# Patient Record
Sex: MALE | Race: BLACK OR AFRICAN AMERICAN | Employment: OTHER | URBAN - METROPOLITAN AREA
[De-identification: names, ages, dates, MRNs, and addresses within clinical notes are randomized per-mention and may not be internally consistent; named-entity substitution may affect disease eponyms.]

---

## 2022-04-08 ENCOUNTER — HOSPITAL ENCOUNTER (EMERGENCY)
Facility: HOSPITAL | Age: 82
Discharge: HOME | End: 2022-04-09
Attending: EMERGENCY MEDICINE

## 2022-04-08 ENCOUNTER — APPOINTMENT (OUTPATIENT)
Dept: ULTRASOUND IMAGING | Facility: HOSPITAL | Age: 82
End: 2022-04-08
Attending: EMERGENCY MEDICINE

## 2022-04-08 VITALS
HEART RATE: 80 BPM | DIASTOLIC BLOOD PRESSURE: 95 MMHG | OXYGEN SATURATION: 96 % | TEMPERATURE: 98 F | SYSTOLIC BLOOD PRESSURE: 184 MMHG | RESPIRATION RATE: 18 BRPM | WEIGHT: 165.38 LBS

## 2022-04-08 DIAGNOSIS — R60.9 PERIPHERAL EDEMA: Primary | ICD-10-CM

## 2022-04-08 LAB
ALBUMIN SERPL-MCNC: 3.1 G/DL (ref 3.4–5)
ALBUMIN/GLOB SERPL: 0.7 {RATIO} (ref 1–2)
ALP LIVER SERPL-CCNC: 88 U/L
ALT SERPL-CCNC: 29 U/L
ANION GAP SERPL CALC-SCNC: 2 MMOL/L (ref 0–18)
AST SERPL-CCNC: 30 U/L (ref 15–37)
BASOPHILS # BLD AUTO: 0.04 X10(3) UL (ref 0–0.2)
BASOPHILS NFR BLD AUTO: 0.7 %
BILIRUB SERPL-MCNC: 0.6 MG/DL (ref 0.1–2)
BUN BLD-MCNC: 18 MG/DL (ref 7–18)
CALCIUM BLD-MCNC: 9 MG/DL (ref 8.5–10.1)
CHLORIDE SERPL-SCNC: 109 MMOL/L (ref 98–112)
CO2 SERPL-SCNC: 29 MMOL/L (ref 21–32)
CREAT BLD-MCNC: 1.35 MG/DL
EOSINOPHIL # BLD AUTO: 0.36 X10(3) UL (ref 0–0.7)
EOSINOPHIL NFR BLD AUTO: 6.2 %
ERYTHROCYTE [DISTWIDTH] IN BLOOD BY AUTOMATED COUNT: 16.4 %
GLOBULIN PLAS-MCNC: 4.3 G/DL (ref 2.8–4.4)
GLUCOSE BLD-MCNC: 120 MG/DL (ref 70–99)
HCT VFR BLD AUTO: 25.3 %
HGB BLD-MCNC: 8.6 G/DL
IMM GRANULOCYTES # BLD AUTO: 0.01 X10(3) UL (ref 0–1)
IMM GRANULOCYTES NFR BLD: 0.2 %
LYMPHOCYTES # BLD AUTO: 1.28 X10(3) UL (ref 1–4)
LYMPHOCYTES NFR BLD AUTO: 21.9 %
MCH RBC QN AUTO: 28.6 PG (ref 26–34)
MCHC RBC AUTO-ENTMCNC: 34 G/DL (ref 31–37)
MCV RBC AUTO: 84.1 FL
MONOCYTES # BLD AUTO: 0.55 X10(3) UL (ref 0.1–1)
MONOCYTES NFR BLD AUTO: 9.4 %
NEUTROPHILS # BLD AUTO: 3.61 X10 (3) UL (ref 1.5–7.7)
NEUTROPHILS # BLD AUTO: 3.61 X10(3) UL (ref 1.5–7.7)
NEUTROPHILS NFR BLD AUTO: 61.6 %
OSMOLALITY SERPL CALC.SUM OF ELEC: 293 MOSM/KG (ref 275–295)
PLATELET # BLD AUTO: 195 10(3)UL (ref 150–450)
POTASSIUM SERPL-SCNC: 3.7 MMOL/L (ref 3.5–5.1)
PROT SERPL-MCNC: 7.4 G/DL (ref 6.4–8.2)
RBC # BLD AUTO: 3.01 X10(6)UL
SARS-COV-2 RNA RESP QL NAA+PROBE: NOT DETECTED
SODIUM SERPL-SCNC: 140 MMOL/L (ref 136–145)
WBC # BLD AUTO: 5.9 X10(3) UL (ref 4–11)

## 2022-04-08 PROCEDURE — 99284 EMERGENCY DEPT VISIT MOD MDM: CPT

## 2022-04-08 PROCEDURE — 85025 COMPLETE CBC W/AUTO DIFF WBC: CPT | Performed by: EMERGENCY MEDICINE

## 2022-04-08 PROCEDURE — 36415 COLL VENOUS BLD VENIPUNCTURE: CPT

## 2022-04-08 PROCEDURE — 93970 EXTREMITY STUDY: CPT | Performed by: EMERGENCY MEDICINE

## 2022-04-08 PROCEDURE — 93971 EXTREMITY STUDY: CPT | Performed by: EMERGENCY MEDICINE

## 2022-04-08 PROCEDURE — 80053 COMPREHEN METABOLIC PANEL: CPT | Performed by: EMERGENCY MEDICINE

## 2022-04-08 RX ORDER — GLYBURIDE-METFORMIN HYDROCHLORIDE 5; 500 MG/1; MG/1
1 TABLET ORAL
COMMUNITY

## 2022-04-08 RX ORDER — CARVEDILOL 6.25 MG/1
6.25 TABLET ORAL 2 TIMES DAILY WITH MEALS
COMMUNITY

## 2022-04-08 RX ORDER — CILOSTAZOL 100 MG/1
100 TABLET ORAL 2 TIMES DAILY
COMMUNITY

## 2022-04-08 RX ORDER — ASPIRIN 81 MG/1
81 TABLET ORAL DAILY
COMMUNITY

## 2022-04-08 RX ORDER — LOSARTAN POTASSIUM 100 MG/1
TABLET ORAL DAILY
COMMUNITY

## 2022-04-08 RX ORDER — TAMSULOSIN HYDROCHLORIDE 0.4 MG/1
CAPSULE ORAL DAILY
COMMUNITY

## 2022-04-08 RX ORDER — ROSUVASTATIN CALCIUM 10 MG/1
10 TABLET, COATED ORAL NIGHTLY
COMMUNITY

## 2022-06-24 ENCOUNTER — APPOINTMENT (OUTPATIENT)
Dept: GENERAL RADIOLOGY | Facility: HOSPITAL | Age: 82
End: 2022-06-24
Attending: EMERGENCY MEDICINE
Payer: MEDICARE

## 2022-06-24 ENCOUNTER — HOSPITAL ENCOUNTER (OUTPATIENT)
Facility: HOSPITAL | Age: 82
Setting detail: OBSERVATION
Discharge: HOME | End: 2022-06-26
Attending: EMERGENCY MEDICINE | Admitting: HOSPITALIST
Payer: MEDICARE

## 2022-06-24 DIAGNOSIS — N17.9 AKI (ACUTE KIDNEY INJURY) (HCC): ICD-10-CM

## 2022-06-24 DIAGNOSIS — J18.9 COMMUNITY ACQUIRED PNEUMONIA, UNSPECIFIED LATERALITY: Primary | ICD-10-CM

## 2022-06-24 DIAGNOSIS — N30.00 ACUTE CYSTITIS WITHOUT HEMATURIA: ICD-10-CM

## 2022-06-24 LAB
ADENOVIRUS PCR:: NOT DETECTED
ALBUMIN SERPL-MCNC: 3.6 G/DL (ref 3.4–5)
ALBUMIN/GLOB SERPL: 0.8 {RATIO} (ref 1–2)
ALP LIVER SERPL-CCNC: 83 U/L
ALT SERPL-CCNC: 18 U/L
ANION GAP SERPL CALC-SCNC: 8 MMOL/L (ref 0–18)
AST SERPL-CCNC: 19 U/L (ref 15–37)
B PARAPERT DNA SPEC QL NAA+PROBE: NOT DETECTED
B PERT DNA SPEC QL NAA+PROBE: NOT DETECTED
BASOPHILS # BLD AUTO: 0.04 X10(3) UL (ref 0–0.2)
BASOPHILS NFR BLD AUTO: 0.4 %
BILIRUB SERPL-MCNC: 0.8 MG/DL (ref 0.1–2)
BILIRUB UR QL STRIP.AUTO: NEGATIVE
BUN BLD-MCNC: 29 MG/DL (ref 7–18)
C PNEUM DNA SPEC QL NAA+PROBE: NOT DETECTED
CALCIUM BLD-MCNC: 9.7 MG/DL (ref 8.5–10.1)
CHLORIDE SERPL-SCNC: 103 MMOL/L (ref 98–112)
CO2 SERPL-SCNC: 23 MMOL/L (ref 21–32)
COLOR UR AUTO: YELLOW
CORONAVIRUS 229E PCR:: NOT DETECTED
CORONAVIRUS HKU1 PCR:: NOT DETECTED
CORONAVIRUS NL63 PCR:: NOT DETECTED
CORONAVIRUS OC43 PCR:: NOT DETECTED
CREAT BLD-MCNC: 1.67 MG/DL
EOSINOPHIL # BLD AUTO: 0.28 X10(3) UL (ref 0–0.7)
EOSINOPHIL NFR BLD AUTO: 3 %
ERYTHROCYTE [DISTWIDTH] IN BLOOD BY AUTOMATED COUNT: 14.1 %
EST. AVERAGE GLUCOSE BLD GHB EST-MCNC: 183 MG/DL (ref 68–126)
FLUAV RNA SPEC QL NAA+PROBE: NOT DETECTED
FLUBV RNA SPEC QL NAA+PROBE: NOT DETECTED
GLOBULIN PLAS-MCNC: 4.7 G/DL (ref 2.8–4.4)
GLUCOSE BLD-MCNC: 139 MG/DL (ref 70–99)
GLUCOSE BLD-MCNC: 179 MG/DL (ref 70–99)
GLUCOSE UR STRIP.AUTO-MCNC: 50 MG/DL
HBA1C MFR BLD: 8 % (ref ?–5.7)
HCT VFR BLD AUTO: 29.6 %
HGB BLD-MCNC: 10.5 G/DL
IMM GRANULOCYTES # BLD AUTO: 0.04 X10(3) UL (ref 0–1)
IMM GRANULOCYTES NFR BLD: 0.4 %
KETONES UR STRIP.AUTO-MCNC: NEGATIVE MG/DL
LYMPHOCYTES # BLD AUTO: 1.21 X10(3) UL (ref 1–4)
LYMPHOCYTES NFR BLD AUTO: 13.1 %
MCH RBC QN AUTO: 28.9 PG (ref 26–34)
MCHC RBC AUTO-ENTMCNC: 35.5 G/DL (ref 31–37)
MCV RBC AUTO: 81.5 FL
METAPNEUMOVIRUS PCR:: NOT DETECTED
MONOCYTES # BLD AUTO: 0.88 X10(3) UL (ref 0.1–1)
MONOCYTES NFR BLD AUTO: 9.5 %
MYCOPLASMA PNEUMONIA PCR:: NOT DETECTED
NEUTROPHILS # BLD AUTO: 6.81 X10 (3) UL (ref 1.5–7.7)
NEUTROPHILS # BLD AUTO: 6.81 X10(3) UL (ref 1.5–7.7)
NEUTROPHILS NFR BLD AUTO: 73.6 %
NITRITE UR QL STRIP.AUTO: POSITIVE
OSMOLALITY SERPL CALC.SUM OF ELEC: 288 MOSM/KG (ref 275–295)
PARAINFLUENZA 1 PCR:: NOT DETECTED
PARAINFLUENZA 2 PCR:: NOT DETECTED
PARAINFLUENZA 3 PCR:: NOT DETECTED
PARAINFLUENZA 4 PCR:: NOT DETECTED
PH UR STRIP.AUTO: 5 [PH] (ref 5–8)
PLATELET # BLD AUTO: 200 10(3)UL (ref 150–450)
POTASSIUM SERPL-SCNC: 4 MMOL/L (ref 3.5–5.1)
PROCALCITONIN SERPL-MCNC: 0.13 NG/ML (ref ?–0.16)
PROT SERPL-MCNC: 8.3 G/DL (ref 6.4–8.2)
PROT UR STRIP.AUTO-MCNC: 100 MG/DL
RBC # BLD AUTO: 3.63 X10(6)UL
RBC #/AREA URNS AUTO: >10 /HPF
RHINOVIRUS/ENTERO PCR:: DETECTED
RSV RNA SPEC QL NAA+PROBE: NOT DETECTED
SARS-COV-2 RNA NPH QL NAA+NON-PROBE: NOT DETECTED
SARS-COV-2 RNA RESP QL NAA+PROBE: NOT DETECTED
SODIUM SERPL-SCNC: 134 MMOL/L (ref 136–145)
SP GR UR STRIP.AUTO: 1.02 (ref 1–1.03)
UROBILINOGEN UR STRIP.AUTO-MCNC: <2 MG/DL
WBC # BLD AUTO: 9.3 X10(3) UL (ref 4–11)
WBC #/AREA URNS AUTO: >50 /HPF
WBC CLUMPS UR QL AUTO: PRESENT /HPF

## 2022-06-24 PROCEDURE — 99220 INITIAL OBSERVATION CARE,LEVL III: CPT | Performed by: HOSPITALIST

## 2022-06-24 PROCEDURE — 71045 X-RAY EXAM CHEST 1 VIEW: CPT | Performed by: EMERGENCY MEDICINE

## 2022-06-24 RX ORDER — GUAIFENESIN 600 MG
600 TABLET, EXTENDED RELEASE 12 HR ORAL 2 TIMES DAILY
Status: DISCONTINUED | OUTPATIENT
Start: 2022-06-24 | End: 2022-06-26

## 2022-06-24 RX ORDER — DEXTROSE MONOHYDRATE 25 G/50ML
50 INJECTION, SOLUTION INTRAVENOUS
Status: DISCONTINUED | OUTPATIENT
Start: 2022-06-24 | End: 2022-06-26

## 2022-06-24 RX ORDER — SODIUM CHLORIDE 9 MG/ML
INJECTION, SOLUTION INTRAVENOUS CONTINUOUS
Status: DISCONTINUED | OUTPATIENT
Start: 2022-06-24 | End: 2022-06-26

## 2022-06-24 RX ORDER — ONDANSETRON 2 MG/ML
4 INJECTION INTRAMUSCULAR; INTRAVENOUS EVERY 6 HOURS PRN
Status: DISCONTINUED | OUTPATIENT
Start: 2022-06-24 | End: 2022-06-26

## 2022-06-24 RX ORDER — HEPARIN SODIUM 5000 [USP'U]/ML
5000 INJECTION, SOLUTION INTRAVENOUS; SUBCUTANEOUS EVERY 8 HOURS SCHEDULED
Status: DISCONTINUED | OUTPATIENT
Start: 2022-06-24 | End: 2022-06-26

## 2022-06-24 RX ORDER — METOCLOPRAMIDE HYDROCHLORIDE 5 MG/ML
5 INJECTION INTRAMUSCULAR; INTRAVENOUS EVERY 8 HOURS PRN
Status: DISCONTINUED | OUTPATIENT
Start: 2022-06-24 | End: 2022-06-26

## 2022-06-24 RX ORDER — NICOTINE POLACRILEX 4 MG
30 LOZENGE BUCCAL
Status: DISCONTINUED | OUTPATIENT
Start: 2022-06-24 | End: 2022-06-26

## 2022-06-24 RX ORDER — SODIUM CHLORIDE 9 MG/ML
1000 INJECTION, SOLUTION INTRAVENOUS ONCE
Status: COMPLETED | OUTPATIENT
Start: 2022-06-24 | End: 2022-06-26

## 2022-06-24 RX ORDER — ACETAMINOPHEN 500 MG
1000 TABLET ORAL ONCE
Status: COMPLETED | OUTPATIENT
Start: 2022-06-24 | End: 2022-06-24

## 2022-06-24 RX ORDER — ACETAMINOPHEN 500 MG
500 TABLET ORAL EVERY 4 HOURS PRN
Status: DISCONTINUED | OUTPATIENT
Start: 2022-06-24 | End: 2022-06-26

## 2022-06-24 RX ORDER — NICOTINE POLACRILEX 4 MG
15 LOZENGE BUCCAL
Status: DISCONTINUED | OUTPATIENT
Start: 2022-06-24 | End: 2022-06-26

## 2022-06-24 NOTE — ED INITIAL ASSESSMENT (HPI)
Family reports confusion and difficulty walking that started yesterday. PT seen at MD office and was told to come to ER. Denies pain, fever. Family reports increase in cough.

## 2022-06-25 LAB
ANION GAP SERPL CALC-SCNC: 6 MMOL/L (ref 0–18)
ATRIAL RATE: 102 BPM
BASOPHILS # BLD AUTO: 0.04 X10(3) UL (ref 0–0.2)
BASOPHILS NFR BLD AUTO: 0.5 %
BUN BLD-MCNC: 24 MG/DL (ref 7–18)
CALCIUM BLD-MCNC: 9.2 MG/DL (ref 8.5–10.1)
CHLORIDE SERPL-SCNC: 106 MMOL/L (ref 98–112)
CO2 SERPL-SCNC: 26 MMOL/L (ref 21–32)
CREAT BLD-MCNC: 1.32 MG/DL
EOSINOPHIL # BLD AUTO: 0.31 X10(3) UL (ref 0–0.7)
EOSINOPHIL NFR BLD AUTO: 4.2 %
ERYTHROCYTE [DISTWIDTH] IN BLOOD BY AUTOMATED COUNT: 14.1 %
GLUCOSE BLD-MCNC: 125 MG/DL (ref 70–99)
GLUCOSE BLD-MCNC: 128 MG/DL (ref 70–99)
GLUCOSE BLD-MCNC: 144 MG/DL (ref 70–99)
GLUCOSE BLD-MCNC: 159 MG/DL (ref 70–99)
GLUCOSE BLD-MCNC: 218 MG/DL (ref 70–99)
HCT VFR BLD AUTO: 28.7 %
HGB BLD-MCNC: 9.9 G/DL
IMM GRANULOCYTES # BLD AUTO: 0.03 X10(3) UL (ref 0–1)
IMM GRANULOCYTES NFR BLD: 0.4 %
LYMPHOCYTES # BLD AUTO: 1.01 X10(3) UL (ref 1–4)
LYMPHOCYTES NFR BLD AUTO: 13.6 %
MCH RBC QN AUTO: 28.9 PG (ref 26–34)
MCHC RBC AUTO-ENTMCNC: 34.5 G/DL (ref 31–37)
MCV RBC AUTO: 83.9 FL
MONOCYTES # BLD AUTO: 0.77 X10(3) UL (ref 0.1–1)
MONOCYTES NFR BLD AUTO: 10.3 %
NEUTROPHILS # BLD AUTO: 5.28 X10 (3) UL (ref 1.5–7.7)
NEUTROPHILS # BLD AUTO: 5.28 X10(3) UL (ref 1.5–7.7)
NEUTROPHILS NFR BLD AUTO: 71 %
OSMOLALITY SERPL CALC.SUM OF ELEC: 292 MOSM/KG (ref 275–295)
P AXIS: 66 DEGREES
P-R INTERVAL: 198 MS
PLATELET # BLD AUTO: 188 10(3)UL (ref 150–450)
POTASSIUM SERPL-SCNC: 3.5 MMOL/L (ref 3.5–5.1)
Q-T INTERVAL: 358 MS
QRS DURATION: 88 MS
QTC CALCULATION (BEZET): 466 MS
R AXIS: 19 DEGREES
RBC # BLD AUTO: 3.42 X10(6)UL
SODIUM SERPL-SCNC: 138 MMOL/L (ref 136–145)
T AXIS: 104 DEGREES
VENTRICULAR RATE: 102 BPM
WBC # BLD AUTO: 7.4 X10(3) UL (ref 4–11)

## 2022-06-25 PROCEDURE — 99226 SUBSEQUENT OBSERVATION CARE: CPT | Performed by: STUDENT IN AN ORGANIZED HEALTH CARE EDUCATION/TRAINING PROGRAM

## 2022-06-25 NOTE — PLAN OF CARE
6/25 alert, forgetful, able to use call light appropriately, with runny nose, denies any sob nor any pain. Ambulates with contact guard rolling walker, gait belt. Continent of b/b., on ivf, iv antibiotics.     Problem: Diabetes/Glucose Control  Goal: Glucose maintained within prescribed range  Description: INTERVENTIONS:  - Monitor Blood Glucose as ordered  - Assess for signs and symptoms of hyperglycemia and hypoglycemia  - Administer ordered medications to maintain glucose within target range  - Assess barriers to adequate nutritional intake and initiate nutrition consult as needed  - Instruct patient on self management of diabetes  Outcome: Progressing     Problem: Patient/Family Goals  Goal: Patient/Family Long Term Goal  Description: Patient's Long Term Goal: to be able to go home    Interventions:  -ivf, iv antibiotics, mucinex, cough medicine.  - See additional Care Plan goals for specific interventions  Outcome: Progressing  Goal: Patient/Family Short Term Goal  Description: Patient's Short Term Goal:   To be able to breath effectively without oxygen    Interventions:   -oxygen sat monitoring, on mucinex, iv antibiotics for pneumonia and uti  - See additional Care Plan goals for specific interventions  Outcome: Progressing

## 2022-06-25 NOTE — PLAN OF CARE
NURSING ADMISSION NOTE      Patient admitted via Cart  Oriented to room. Safety precautions initiated. Bed in low position. Call light in reach. AO x3/4, forgetful at times history of dementia. RA. Tele - NSR. Heparin subcutaneous. Briefed. Voids via urinal. No reports of pain. Up stanbdby with a walker, increased weakness. Continuous fluids running per MAR. IV Rocephin. Carb control diet. QID accuchecks. Daughter and patient updated on POC. No futher needs at this time.      Problem: Diabetes/Glucose Control  Goal: Glucose maintained within prescribed range  Description: INTERVENTIONS:  - Monitor Blood Glucose as ordered  - Assess for signs and symptoms of hyperglycemia and hypoglycemia  - Administer ordered medications to maintain glucose within target range  - Assess barriers to adequate nutritional intake and initiate nutrition consult as needed  - Instruct patient on self management of diabetes  Outcome: Progressing     Problem: Patient/Family Goals  Goal: Patient/Family Long Term Goal  Description: Patient's Long Term Goal:     Interventions:  -   - See additional Care Plan goals for specific interventions  Outcome: Progressing  Goal: Patient/Family Short Term Goal  Description: Patient's Short Term Goal:     Interventions:   -   - See additional Care Plan goals for specific interventions  Outcome: Progressing

## 2022-06-25 NOTE — ED QUICK NOTES
Orders for admission, patient is aware of plan and ready to go upstairs. Any questions, please call ED RN Kraig Willis  at [facility]?  yes  Type of COVID test sent:  COVID Suspicion level: Low/High  low    Titratable drug(s) infusing:  Rate:    LOC at time of transport:  aox4  Other pertinent information:    CIWA score=  NIH score=

## 2022-06-26 VITALS
RESPIRATION RATE: 19 BRPM | OXYGEN SATURATION: 100 % | DIASTOLIC BLOOD PRESSURE: 61 MMHG | WEIGHT: 147.13 LBS | SYSTOLIC BLOOD PRESSURE: 159 MMHG | HEART RATE: 63 BPM | TEMPERATURE: 99 F | BODY MASS INDEX: 23.65 KG/M2 | HEIGHT: 66 IN

## 2022-06-26 LAB
GLUCOSE BLD-MCNC: 113 MG/DL (ref 70–99)
GLUCOSE BLD-MCNC: 139 MG/DL (ref 70–99)
GLUCOSE BLD-MCNC: 186 MG/DL (ref 70–99)
L PNEUMO AG UR QL: NEGATIVE
STREP PNEUMO ANTIGEN, URINE: NEGATIVE

## 2022-06-26 PROCEDURE — 99217 OBSERVATION CARE DISCHARGE: CPT | Performed by: STUDENT IN AN ORGANIZED HEALTH CARE EDUCATION/TRAINING PROGRAM

## 2022-06-26 RX ORDER — TAMSULOSIN HYDROCHLORIDE 0.4 MG/1
0.4 CAPSULE ORAL DAILY
Status: DISCONTINUED | OUTPATIENT
Start: 2022-06-26 | End: 2022-06-26

## 2022-06-26 RX ORDER — CARVEDILOL 12.5 MG/1
12.5 TABLET ORAL 2 TIMES DAILY WITH MEALS
Status: DISCONTINUED | OUTPATIENT
Start: 2022-06-26 | End: 2022-06-26

## 2022-06-26 RX ORDER — ASPIRIN 81 MG/1
81 TABLET ORAL DAILY
Status: DISCONTINUED | OUTPATIENT
Start: 2022-06-26 | End: 2022-06-26

## 2022-06-26 RX ORDER — CEPHALEXIN 500 MG/1
500 CAPSULE ORAL 4 TIMES DAILY
Qty: 20 CAPSULE | Refills: 0 | Status: SHIPPED | OUTPATIENT
Start: 2022-06-26 | End: 2022-07-01

## 2022-06-26 RX ORDER — ROSUVASTATIN CALCIUM 10 MG/1
10 TABLET, COATED ORAL NIGHTLY
Status: DISCONTINUED | OUTPATIENT
Start: 2022-06-26 | End: 2022-06-26

## 2022-06-26 RX ORDER — CILOSTAZOL 100 MG/1
100 TABLET ORAL 2 TIMES DAILY
Status: DISCONTINUED | OUTPATIENT
Start: 2022-06-26 | End: 2022-06-26

## 2022-06-26 RX ORDER — LOSARTAN POTASSIUM 100 MG/1
100 TABLET ORAL DAILY
Status: DISCONTINUED | OUTPATIENT
Start: 2022-06-26 | End: 2022-06-26

## 2022-06-26 NOTE — PLAN OF CARE
A&O x3/4. Forgetful at times. RA. Tele - NSR. Heparin subcutaneous. Voids up stanby with walker and gait belt. No reports of any pain. IV rocephin. IV Zithromax. QID accuchecks. Fluids running per MAR. Carb controlled diet. Pt  resting in bed with call light within reach, no further needs at this time.         Problem: Diabetes/Glucose Control  Goal: Glucose maintained within prescribed range  Description: INTERVENTIONS:  - Monitor Blood Glucose as ordered  - Assess for signs and symptoms of hyperglycemia and hypoglycemia  - Administer ordered medications to maintain glucose within target range  - Assess barriers to adequate nutritional intake and initiate nutrition consult as needed  - Instruct patient on self management of diabetes  Outcome: Progressing     Problem: Patient/Family Goals  Goal: Patient/Family Long Term Goal  Description: Patient's Long Term Goal:     Interventions:  -  - See additional Care Plan goals for specific interventions  Outcome: Progressing  Goal: Patient/Family Short Term Goal  Description: Patient's Short Term Goal:     Interventions:   -   - See additional Care Plan goals for specific interventions  Outcome: Progressing

## 2022-06-26 NOTE — PLAN OF CARE
Problem: Diabetes/Glucose Control  Goal: Glucose maintained within prescribed range  Description: INTERVENTIONS:  - Monitor Blood Glucose as ordered  - Assess for signs and symptoms of hyperglycemia and hypoglycemia  - Administer ordered medications to maintain glucose within target range  - Assess barriers to adequate nutritional intake and initiate nutrition consult as needed  - Instruct patient on self management of diabetes  6/26/2022 1057 by Yuliana Fischer RN  Outcome: Progressing  6/26/2022 1056 by Yuliana Fischer RN  Outcome: Progressing     Problem: Patient/Family Goals  Goal: Patient/Family Long Term Goal  Description: Patient's Long Term Goal: Go home    Interventions:  - Follow POC  - See additional Care Plan goals for specific interventions  6/26/2022 1057 by Yuliana Fischer RN  Outcome: Progressing  6/26/2022 1056 by Yuliana Fischer RN  Outcome: Progressing  Goal: Patient/Family Short Term Goal  Description: Patient's Short Term Goal: Go home    Interventions:   - 6/26 Am:Feel better  - See additional Care Plan goals for specific interventions  6/26/2022 1057 by Yuliana Fischer RN  Outcome: Progressing  6/26/2022 1056 by Yuliana Fischer RN  Outcome: Progressing

## 2022-06-26 NOTE — PROGRESS NOTES
NURSING DISCHARGE NOTE     Discharged Home with dtr via Wheelchair. Accompanied by Support staff  Belongings Taken by patient/family. Patient is stable to discharge home. Medically cleared by all consults and hospitalist. Reviewed discharge instructions about medications and follow-up appointments with the patient. Patient verbalized understanding. Questions and concerns addressed. IV line dc'ed. Pressure and dressing applied. Clean/Dry/Intact. Discharge navigator completed. Tele box removed,cleaned and returned to drawer. All belongings sent with patient.

## 2022-06-28 LAB
ESTIMATED AVERAGE GLUCOSE: 174 MG/DL
HEMOGLOBIN A1C: 7.7 %

## 2022-07-01 ENCOUNTER — PATIENT OUTREACH (OUTPATIENT)
Dept: CASE MANAGEMENT | Age: 82
End: 2022-07-01

## 2022-07-01 ENCOUNTER — TELEPHONE (OUTPATIENT)
Dept: NEUROLOGY | Facility: CLINIC | Age: 82
End: 2022-07-01

## 2022-07-01 NOTE — TELEPHONE ENCOUNTER
LM explaining that a brief memory assessment is done during the office visit, but that usually a neuropsychology evaluation is ordered for more extensive memory testing.

## 2022-07-01 NOTE — TELEPHONE ENCOUNTER
pt's daughter wants to know at the upcoming appt will the provider do the dementia screening same day?

## 2022-07-05 NOTE — PROGRESS NOTES
2nd attempt DM apt request    Kaylyn Howell Rule  [address] [facility]  [place] [id number]  [phone]  Patient has apt previously scheduled

## 2022-07-08 ENCOUNTER — OFFICE VISIT (OUTPATIENT)
Dept: NEUROLOGY | Facility: CLINIC | Age: 82
End: 2022-07-08
Payer: MEDICARE

## 2022-07-08 ENCOUNTER — LAB ENCOUNTER (OUTPATIENT)
Dept: LAB | Age: 82
End: 2022-07-08
Attending: Other
Payer: MEDICARE

## 2022-07-08 VITALS
SYSTOLIC BLOOD PRESSURE: 130 MMHG | BODY MASS INDEX: 24 KG/M2 | RESPIRATION RATE: 16 BRPM | WEIGHT: 147 LBS | DIASTOLIC BLOOD PRESSURE: 60 MMHG | HEART RATE: 68 BPM

## 2022-07-08 DIAGNOSIS — R41.3 MEMORY CHANGE: ICD-10-CM

## 2022-07-08 DIAGNOSIS — R41.3 MEMORY CHANGE: Primary | ICD-10-CM

## 2022-07-08 LAB
FOLATE SERPL-MCNC: 14.3 NG/ML (ref 8.7–?)
T4 FREE SERPL-MCNC: 0.9 NG/DL (ref 0.8–1.7)
TSI SER-ACNC: 3.48 MIU/ML (ref 0.36–3.74)
VIT B12 SERPL-MCNC: 552 PG/ML (ref 193–986)

## 2022-07-08 PROCEDURE — 82746 ASSAY OF FOLIC ACID SERUM: CPT

## 2022-07-08 PROCEDURE — 3078F DIAST BP <80 MM HG: CPT | Performed by: OTHER

## 2022-07-08 PROCEDURE — 3075F SYST BP GE 130 - 139MM HG: CPT | Performed by: OTHER

## 2022-07-08 PROCEDURE — 99204 OFFICE O/P NEW MOD 45 MIN: CPT | Performed by: OTHER

## 2022-07-08 PROCEDURE — 36415 COLL VENOUS BLD VENIPUNCTURE: CPT

## 2022-07-08 PROCEDURE — 82607 VITAMIN B-12: CPT

## 2022-07-08 PROCEDURE — 1111F DSCHRG MED/CURRENT MED MERGE: CPT | Performed by: OTHER

## 2022-07-08 PROCEDURE — 84443 ASSAY THYROID STIM HORMONE: CPT

## 2022-07-08 PROCEDURE — 84439 ASSAY OF FREE THYROXINE: CPT

## 2022-07-08 RX ORDER — HYDROCHLOROTHIAZIDE 25 MG/1
25 TABLET ORAL EVERY MORNING
COMMUNITY
Start: 2022-06-28

## 2022-07-08 NOTE — PROGRESS NOTES
Patient states Dr. Hannah Bond sent him here to determine any memory change. Patient denies changes in memory or speech. Denies facial numbness or tingling.

## 2024-09-18 ENCOUNTER — OFFICE VISIT (OUTPATIENT)
Dept: SURGERY | Facility: CLINIC | Age: 84
End: 2024-09-18
Payer: MEDICARE

## 2024-09-18 DIAGNOSIS — R82.71 BACTERIURIA: ICD-10-CM

## 2024-09-18 DIAGNOSIS — N13.8 BPH WITH OBSTRUCTION/LOWER URINARY TRACT SYMPTOMS: ICD-10-CM

## 2024-09-18 DIAGNOSIS — N40.1 BPH WITH OBSTRUCTION/LOWER URINARY TRACT SYMPTOMS: ICD-10-CM

## 2024-09-18 DIAGNOSIS — R33.9 URINARY RETENTION: Primary | ICD-10-CM

## 2024-09-18 DIAGNOSIS — R82.90 URINE FINDING: ICD-10-CM

## 2024-09-18 DIAGNOSIS — N17.9 AKI (ACUTE KIDNEY INJURY) (HCC): ICD-10-CM

## 2024-09-18 LAB
APPEARANCE: CLEAR
BILIRUBIN: NEGATIVE
GLUCOSE (URINE DIPSTICK): NEGATIVE MG/DL
KETONES (URINE DIPSTICK): NEGATIVE MG/DL
MULTISTIX LOT#: ABNORMAL NUMERIC
NITRITE, URINE: NEGATIVE
OCCULT BLOOD: NEGATIVE
PH, URINE: 5.5 (ref 4.5–8)
PROTEIN (URINE DIPSTICK): 30 MG/DL
SPECIFIC GRAVITY: 1.01 (ref 1–1.03)
UROBILINOGEN,SEMI-QN: 0.2 MG/DL (ref 0–1.9)

## 2024-09-18 PROCEDURE — 81003 URINALYSIS AUTO W/O SCOPE: CPT | Performed by: PHYSICIAN ASSISTANT

## 2024-09-18 PROCEDURE — 99204 OFFICE O/P NEW MOD 45 MIN: CPT | Performed by: PHYSICIAN ASSISTANT

## 2024-09-18 PROCEDURE — 51798 US URINE CAPACITY MEASURE: CPT | Performed by: PHYSICIAN ASSISTANT

## 2024-09-18 RX ORDER — AMLODIPINE BESYLATE 10 MG/1
10 TABLET ORAL AS DIRECTED
COMMUNITY
Start: 2023-03-06

## 2024-09-18 RX ORDER — HYDRALAZINE HYDROCHLORIDE 25 MG/1
25 TABLET, FILM COATED ORAL 2 TIMES DAILY
COMMUNITY
Start: 2023-04-18

## 2024-09-18 RX ORDER — FERROUS SULFATE 325(65) MG
1 TABLET ORAL
COMMUNITY
Start: 2023-02-14

## 2024-09-18 NOTE — PROGRESS NOTES
[facility], [facility]    Urology Consult Note    History of Present Illness:   Patient is a 84 year old male with hx of HTN, HL, CAD, PCA, who presents today for consultation from Dr. Marino's office for urinary retention.    Patient here from [place] and scheduled visit due to difficulty voiding. He was seen 9/3/24 by urologist, Dr. Sanchez, in [place], for cystoscopy due to urinary retention. Cystoscopy did reveal mild prostatic regrowth at bilateral lobes, but overall noted to appear non obstructive. Moctezuma catheter was recommended, but patient declined. Per notes, physician felt that repeat TURP would likely not benefit patient.     Patient had prior TURP in 2018 for urinary retention. Pathology with GG1 prostate cancer with <5% tissue involved. No treatment. He was referred again to urology in 2022 for urinary retention after CT scan 12/2022 noted distended bladder. He did not schedule an appointment.    More recently he was seen by nephrologist due to worsening renal function. Was referred back to urology which prompted visit 8/26/24. His PVR was noted to be >500mL. Patient refused moctezuma catheter but agreed to cystoscopy which he returned for 9/3/24.    Patient denies any voiding complaints. Denies dysuria, gross hematuria.     Former smoker.    Urine culture 9/3/24 E Coli, R T/S, Amp    PSA 6.9 09/03/2024  PSA 5.5 04/21/2021   PSA 5.4 07/30/2020   PSA 2.5 09/17/2018   PSA 2.5 06/13/2014   PSA 1.4 09/14/2011     HISTORY:  Past Medical History:    AMS (altered mental status)    Ataxia    Atherosclerosis of coronary artery    Diabetes (HCC)    Essential hypertension    High blood pressure    High cholesterol    Hyperlipidemia    Visual impairment      No past surgical history on file.   No family history on file.   Social History:   Social History     Socioeconomic History    Marital status: Single   Tobacco Use    Smoking status: Former    Smokeless tobacco: Never   Vaping Use    Vaping  status: Never Used   Substance and Sexual Activity    Alcohol use: Not Currently    Drug use: Not Currently   Other Topics Concern    Caffeine Concern Yes     Comment: coffee occ     Exercise No     Social Determinants of Health     Food Insecurity: No Food Insecurity (2/13/2022)    Received from [facility] Vital Sign     Worried About Running Out of Food in the Last Year: Never true     Ran Out of Food in the Last Year: Never true        Allergies  No Known Allergies    Review of Systems:   A 10-point review of systems was completed and is negative other than as noted above.    Physical Exam:   There were no vitals taken for this visit.    GENERAL APPEARANCE: well developed, well nourished, in no acute distress  NEUROLOGIC: no localizing neurologic signs, alert and oriented x 3, converses appropriately  HEAD: atraumatic, normocephalic  EYES: sclera non-icteric  ORAL CAVITY: mucosa moist  NECK/THYROID: no obvious masses or goiter  LUNGS: non-labored breathing  ABDOMEN: soft, nontender, nondistended  EXTREMITIES: warm, well-perfused. No clubbing, cyanosis or edema.  SKIN: no obvious rashes    Results:     Laboratory Data:  Lab Results   Component Value Date    WBC 7.4 06/25/2022    HGB 9.9 (L) 06/25/2022    .0 06/25/2022     Lab Results   Component Value Date     06/25/2022    K 3.5 06/25/2022     06/25/2022    CO2 26.0 06/25/2022    BUN 24 (H) 06/25/2022     (H) 06/25/2022    GFRAA 57 (L) 06/25/2022    AST 19 06/24/2022    ALT 18 06/24/2022    TP 8.3 (H) 06/24/2022    ALB 3.6 06/24/2022    CA 9.2 06/25/2022       Urinalysis Results (last three years):  Recent Labs     06/24/22 1913   COLORUR Yellow   CLARITY Cloudy*   SPECGRAVITY 1.016   PHURINE 5.0   PROUR 100*   GLUUR 50*   KETUR Negative   BILUR Negative   BLOODURINE Moderate*   NITRITE Positive*   UROBILINOGEN <2.0   LEUUR Large*   WBCUR >50*   RBCUR >10*   BACUR 3+*       Urine Culture  Results (last three years):  Lab Results   Component Value Date    URINECUL >100,000 CFU/ML Escherichia coli (A) 06/24/2022       Imaging  No results found.      Impression:     Patient is a 84 year old male with hx of HTN, HL, CAD, PCA, who presents today for consultation from Dr. Marino's office for urinary retention.    Reviewed prior records and evaluation results. Recommend return for cysto, UDS. Joe today. Continue tamsulosin.    Recommendations:  As above.     Thank you very much for this consult. Please call if there are any questions or concerns.     Janice Camacho PA-C  Urology  [facility]    Date: 9/18/2024

## 2024-09-18 NOTE — PROGRESS NOTES
Patient in exam room sitting in chair -   Patient able to walk to exam table and sit on exam table without assistance. Daughter (Lianet) present with patient.   During Moctezuma Catheter insertion - Daughter waited in hallway.  16 [place]ian coude Moctezuma Catheter inserted using sterile technique, betadine, Lidocaine Gel, Lubricant - Moctezuma Catheter draining félix clear urine, Inflated moctezuma catheter balloon with 10mL sterile water, secured moctezuma catheter with moctezuma statlock right upper thigh, and connected moctezuma catheter with extension to sterile moctezuma leg bag. Moctezuma Catheter draining clear félix urine - emptied 550mL.   No distress noted.  Provided Education materials to patient and patient daughter on how to care for Moctezuma Catheter including demonstrating to patient and patient daughter how to empty urine from moctezuma leg bag and overnight bag including how to disconnect moctezuma leg bag and connect overnight bag.   Daughter verbalizes understanding.  All questions answered.  Instructed patient and patient daughter if moctezuma catheter is not draining, or abdominal pain/presuure, or pelvic pain/pressure - ER  Patient daughter verbalizes understanding.  Patient left office with daughter.

## 2024-09-30 ENCOUNTER — HOSPITAL ENCOUNTER (OUTPATIENT)
Dept: ULTRASOUND IMAGING | Facility: HOSPITAL | Age: 84
Discharge: HOME | End: 2024-09-30
Attending: FAMILY MEDICINE
Payer: MEDICARE

## 2024-09-30 DIAGNOSIS — M79.89 SWELLING OF LIMB: ICD-10-CM

## 2024-09-30 PROCEDURE — 93971 EXTREMITY STUDY: CPT | Performed by: FAMILY MEDICINE

## 2024-10-07 NOTE — PROGRESS NOTES
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    New to me, saw Janice in the past.    Following for:  1. Urinary retention  - s/p cysto ([facility] - [place]) 9/3/24 showing mild BPH regrowth  - s/p TURP 2018  2. 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 Recurrent UTIs  - UCx 9/3/24: E coli R T/S, amp    PCP - Jamila  Prior Urologist - Janice 9/18/24    Presents to establish care with me, review UDS, office cysto, discuss next steps.    Follows with a Urologist in [place] and was noted to have PVR > 500 mL in the past and pt initially refused moctezuma cath but eventually agreed to have moctezuma placement. His urologist told him his bladder was too weak and probably would not recover and recommended consideration for either CIC versus chronic moctezuma. They had also discussed SP tube placement but his urologist only performs this procedure on the [facility] so he came to stay with his daughter in [place] and wants to have any procedures done here if possible and plans to follow back up with his Urologist in [place] for office visits.    He feels weak today, didn't eat this morning, and just started vomiting after UDS.  Lives in [place]. Currently here because he can't void.  Lives with daughter. Using walker to ambulate.  He is taking flomax.    Gross hematuria: none  Tobacco hx: 29, quit 1990  Kidney stone hx: none  Fam h/o  malignancy: none    Prior PSAs:  - 6.9 9/3/24  - 5.5 4/21/21  - 5.4 7/30/20  - 2.5 9/17/18  - 2.5 6/13/14    Cr 1.58 8/26/24    Drinks ~ 40-60 oz water with light yellow urine.    UDS today: first sensation 20 mL, first desire 98 mL, strong desire 242mL, max bladder capacity of 255 mL. No leakage with valsalva. The patient was not able to void with max detrusor pressure of zero cm H2O and 255 mL PVR.    Cysto today: s/p TURP with open bladder neck, minimal residual prostatic tissue    Discussed that UDS indicate he has  poor/absent bladder contractility and I do not think that TURP will provide clinically meaningful benefit. Options would include CIC teaching, repeat UDS when he is feeling better, or maintaining chronic moctezuma catheter. We discussed the rationale for and risks and benefits to each option. He asked if we could remove the moctezuma catheter and we discussed that if he removed his Moctezuma catheter without performing CIC there is a good chance he will go back into retention and could have complications from this such as UTI, LA.  He is not amenable to learning how to perform CIC.     He and his daughter had questions about SP tube placement.  They would like to proceed with this as opposed to leaving an indwelling Moctezuma catheter. We discussed the risks and benefits to the procedure including, but not limited to, bleeding, infection, possible damage to surrounding structures. The patient understands and would like to proceed.    Discussed he should have CT SP at some point to ensure no upper tract abnormalities.    He will increase his water intake for UTI prevention.  Continuing Flomax for now but can probably stop if he opts to maintain IFC.  Checking CT SP. OR for SP tube placement.    PROCEDURE NOTE    PROCEDURE PERFORMED: Flexible Cystoscopy    After informed consent and urinalysis was obtained, he was placed in the supine position and prepped and draped in the usual sterile fashion using Betadine. Local anesthesia was induced by the introduction of 2% Lidocaine jelly per urethra.  A 16 Khmer flexible scope was passed through the anterior urethra, the posterior urethra and prostate were negotiated and the bladder was entered. The entirety of the bladder was examined and the scope was retroflexed to examine the bladder neck.     Findings: as noted above    The patient tolerated the procedure well, suffered no complications, was able to void spontaneously after completion of the procedure in the office, and left the  office in good condition.    Dariana-procedural antibiotics were given.  _________________________________    HISTORY:  Past Medical History:    AMS (altered mental status)    Ataxia    Atherosclerosis of coronary artery    Diabetes (HCC)    Essential hypertension    High blood pressure    High cholesterol    Hyperlipidemia    Visual impairment      No past surgical history on file.   No family history on file.   Social History:   Social History     Socioeconomic History    Marital status: Single   Tobacco Use    Smoking status: Former    Smokeless tobacco: Never   Vaping Use    Vaping status: Never Used   Substance and Sexual Activity    Alcohol use: Not Currently    Drug use: Not Currently   Other Topics Concern    Caffeine Concern Yes     Comment: coffee occ     Exercise No     Social Drivers of Health     Food Insecurity: No Food Insecurity (2/13/2022)    Received from [facility]    Hunger Vital Sign     Worried About Running Out of Food in the Last Year: Never true     Ran Out of Food in the Last Year: Never true        Medications (Active prior to today's visit):  Current Outpatient Medications   Medication Sig Dispense Refill    amLODIPine 10 MG Oral Tab Take 1 tablet (10 mg total) by mouth As Directed.      Ferrous Sulfate 325 (65 Fe) MG Oral Tab Take 1 tablet (325 mg total) by mouth daily with breakfast.      hydrALAZINE 25 MG Oral Tab Take 1 tablet (25 mg total) by mouth 2 (two) times daily.      metFORMIN 500 MG Oral Tab Take 1 tablet (500 mg total) by mouth daily with breakfast.      hydroCHLOROthiazide 25 MG Oral Tab Take 1 tablet (25 mg total) by mouth every morning.      aspirin 81 MG Oral Tab EC Take 1 tablet (81 mg total) by mouth daily.      carvedilol 25 MG Oral Tab Take 2 tablets (50 mg total) by mouth 2 (two) times daily with meals.      cilostazol 100 MG Oral Tab Take 1 tablet (100 mg total) by mouth 2 (two) times daily.      rosuvastatin 10 MG Oral Tab Take 1  tablet (10 mg total) by mouth nightly.      tamsulosin 0.4 MG Oral Cap Take by mouth daily.      losartan 100 MG Oral Tab Take by mouth daily.         Allergies:  No Known Allergies      ROS:     A comprehensive 10 point review of systems was completed.  Pertinent positives and negatives noted in the the HPI.    PHYSICAL EXAM:     GENERAL APPEARANCE: well, developed, well nourished, in no acute distress  NEUROLOGIC: nonfocal, alert and oriented  HEAD: normocephalic, atraumatic  EYES: sclera non-icteric  EARS: hearing intact  ORAL CAVITY: mucosa moist  NECK/THYROID: no obvious goiter or masses  LUNGS: nonlabored breathing  ABDOMEN: soft, no obvious masses or tenderness  SKIN: no obvious rashes    : as noted above    ASSESSMENT/PLAN:   Diagnoses and all orders for this visit:    BPH with obstruction/lower urinary tract symptoms    Urinary retention  -     CYSTOURETHROSCOPY  -     CYSTOMETROGRAM W/&UP  -     INTRAABDOMINAL PRESSURE TEST  -     ANAL/URINARY MUSCLE STUDY    LA (acute kidney injury) (HCC)    Bacteriuria    Asymptomatic microscopic hematuria  -     CT ABDOMEN+PELVIS KIDNEYSTONE 2D RNDR(NO IV,NO ORAL)(CPT=74176); Future      - as noted above.    Thanks again for this consult.    Rikki Gamez MD, FACS  Urologist  [facility]  Office: [phone]

## 2024-10-07 NOTE — H&P (VIEW-ONLY)
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    New to me, saw Janice in the past.    Following for:  1. Urinary retention  - s/p cysto ([facility] - [place]) 9/3/24 showing mild BPH regrowth  - s/p TURP 2018  2. 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 Recurrent UTIs  - UCx 9/3/24: E coli R T/S, amp    PCP - Jamila  Prior Urologist - Janice 9/18/24    Presents to establish care with me, review UDS, office cysto, discuss next steps.    Follows with a Urologist in [place] and was noted to have PVR > 500 mL in the past and pt initially refused moctezuma cath but eventually agreed to have moctezuma placement. His urologist told him his bladder was too weak and probably would not recover and recommended consideration for either CIC versus chronic moctezuma. They had also discussed SP tube placement but his urologist only performs this procedure on the [facility] so he came to stay with his daughter in [place] and wants to have any procedures done here if possible and plans to follow back up with his Urologist in [place] for office visits.    He feels weak today, didn't eat this morning, and just started vomiting after UDS.  Lives in [place]. Currently here because he can't void.  Lives with daughter. Using walker to ambulate.  He is taking flomax.    Gross hematuria: none  Tobacco hx: 30, quit 1990  Kidney stone hx: none  Fam h/o  malignancy: none    Prior PSAs:  - 6.9 9/3/24  - 5.5 4/21/21  - 5.4 7/30/20  - 2.5 9/17/18  - 2.5 6/13/14    Cr 1.58 8/26/24    Drinks ~ 40-60 oz water with light yellow urine.    UDS today: first sensation 20 mL, first desire 98 mL, strong desire 242mL, max bladder capacity of 255 mL. No leakage with valsalva. The patient was not able to void with max detrusor pressure of zero cm H2O and 255 mL PVR.    Cysto today: s/p TURP with open bladder neck, minimal residual prostatic tissue    Discussed that UDS indicate he has  poor/absent bladder contractility and I do not think that TURP will provide clinically meaningful benefit. Options would include CIC teaching, repeat UDS when he is feeling better, or maintaining chronic moctezuma catheter. We discussed the rationale for and risks and benefits to each option. He asked if we could remove the moctezuma catheter and we discussed that if he removed his Moctezuma catheter without performing CIC there is a good chance he will go back into retention and could have complications from this such as UTI, LA.  He is not amenable to learning how to perform CIC.     He and his daughter had questions about SP tube placement.  They would like to proceed with this as opposed to leaving an indwelling Moctezuma catheter. We discussed the risks and benefits to the procedure including, but not limited to, bleeding, infection, possible damage to surrounding structures. The patient understands and would like to proceed.    Discussed he should have CT SP at some point to ensure no upper tract abnormalities.    He will increase his water intake for UTI prevention.  Continuing Flomax for now but can probably stop if he opts to maintain IFC.  Checking CT SP. OR for SP tube placement.    PROCEDURE NOTE    PROCEDURE PERFORMED: Flexible Cystoscopy    After informed consent and urinalysis was obtained, he was placed in the supine position and prepped and draped in the usual sterile fashion using Betadine. Local anesthesia was induced by the introduction of 2% Lidocaine jelly per urethra.  A 16 Armenian flexible scope was passed through the anterior urethra, the posterior urethra and prostate were negotiated and the bladder was entered. The entirety of the bladder was examined and the scope was retroflexed to examine the bladder neck.     Findings: as noted above    The patient tolerated the procedure well, suffered no complications, was able to void spontaneously after completion of the procedure in the office, and left the  office in good condition.    Dariana-procedural antibiotics were given.  _________________________________    HISTORY:  Past Medical History:    AMS (altered mental status)    Ataxia    Atherosclerosis of coronary artery    Diabetes (HCC)    Essential hypertension    High blood pressure    High cholesterol    Hyperlipidemia    Visual impairment      No past surgical history on file.   No family history on file.   Social History:   Social History     Socioeconomic History    Marital status: Single   Tobacco Use    Smoking status: Former    Smokeless tobacco: Never   Vaping Use    Vaping status: Never Used   Substance and Sexual Activity    Alcohol use: Not Currently    Drug use: Not Currently   Other Topics Concern    Caffeine Concern Yes     Comment: coffee occ     Exercise No     Social Drivers of Health     Food Insecurity: No Food Insecurity (2/13/2022)    Received from [place] [facility]    Hunger Vital Sign     Worried About Running Out of Food in the Last Year: Never true     Ran Out of Food in the Last Year: Never true        Medications (Active prior to today's visit):  Current Outpatient Medications   Medication Sig Dispense Refill    amLODIPine 10 MG Oral Tab Take 1 tablet (10 mg total) by mouth As Directed.      Ferrous Sulfate 325 (65 Fe) MG Oral Tab Take 1 tablet (325 mg total) by mouth daily with breakfast.      hydrALAZINE 25 MG Oral Tab Take 1 tablet (25 mg total) by mouth 2 (two) times daily.      metFORMIN 500 MG Oral Tab Take 1 tablet (500 mg total) by mouth daily with breakfast.      hydroCHLOROthiazide 25 MG Oral Tab Take 1 tablet (25 mg total) by mouth every morning.      aspirin 81 MG Oral Tab EC Take 1 tablet (81 mg total) by mouth daily.      carvedilol 25 MG Oral Tab Take 2 tablets (50 mg total) by mouth 2 (two) times daily with meals.      cilostazol 100 MG Oral Tab Take 1 tablet (100 mg total) by mouth 2 (two) times daily.      rosuvastatin 10 MG Oral Tab Take 1  tablet (10 mg total) by mouth nightly.      tamsulosin 0.4 MG Oral Cap Take by mouth daily.      losartan 100 MG Oral Tab Take by mouth daily.         Allergies:  No Known Allergies      ROS:     A comprehensive 10 point review of systems was completed.  Pertinent positives and negatives noted in the the HPI.    PHYSICAL EXAM:     GENERAL APPEARANCE: well, developed, well nourished, in no acute distress  NEUROLOGIC: nonfocal, alert and oriented  HEAD: normocephalic, atraumatic  EYES: sclera non-icteric  EARS: hearing intact  ORAL CAVITY: mucosa moist  NECK/THYROID: no obvious goiter or masses  LUNGS: nonlabored breathing  ABDOMEN: soft, no obvious masses or tenderness  SKIN: no obvious rashes    : as noted above    ASSESSMENT/PLAN:   Diagnoses and all orders for this visit:    BPH with obstruction/lower urinary tract symptoms    Urinary retention  -     CYSTOURETHROSCOPY  -     CYSTOMETROGRAM W/&UP  -     INTRAABDOMINAL PRESSURE TEST  -     ANAL/URINARY MUSCLE STUDY    LA (acute kidney injury) (HCC)    Bacteriuria    Asymptomatic microscopic hematuria  -     CT ABDOMEN+PELVIS KIDNEYSTONE 2D RNDR(NO IV,NO ORAL)(CPT=74176); Future      - as noted above.    Thanks again for this consult.    Rikki Gamez MD, FACS  Urologist  [facility]  Office: [phone]

## 2024-10-15 ENCOUNTER — APPOINTMENT (OUTPATIENT)
Dept: CV DIAGNOSTICS | Facility: HOSPITAL | Age: 84
End: 2024-10-15
Attending: INTERNAL MEDICINE
Payer: MEDICARE

## 2024-10-15 ENCOUNTER — TELEPHONE (OUTPATIENT)
Dept: SURGERY | Facility: CLINIC | Age: 84
End: 2024-10-15

## 2024-10-15 ENCOUNTER — APPOINTMENT (OUTPATIENT)
Dept: CT IMAGING | Facility: HOSPITAL | Age: 84
End: 2024-10-15
Attending: EMERGENCY MEDICINE
Payer: MEDICARE

## 2024-10-15 ENCOUNTER — HOSPITAL ENCOUNTER (INPATIENT)
Facility: HOSPITAL | Age: 84
LOS: 3 days | Discharge: HOME | End: 2024-10-18
Attending: EMERGENCY MEDICINE | Admitting: HOSPITALIST
Payer: MEDICARE

## 2024-10-15 ENCOUNTER — PROCEDURE (OUTPATIENT)
Dept: SURGERY | Facility: CLINIC | Age: 84
End: 2024-10-15

## 2024-10-15 ENCOUNTER — APPOINTMENT (OUTPATIENT)
Dept: GENERAL RADIOLOGY | Facility: HOSPITAL | Age: 84
End: 2024-10-15
Payer: MEDICARE

## 2024-10-15 ENCOUNTER — NURSE ONLY (OUTPATIENT)
Dept: SURGERY | Facility: CLINIC | Age: 84
End: 2024-10-15
Payer: MEDICARE

## 2024-10-15 DIAGNOSIS — I50.9 ACUTE ON CHRONIC CONGESTIVE HEART FAILURE, UNSPECIFIED HEART FAILURE TYPE (HCC): ICD-10-CM

## 2024-10-15 DIAGNOSIS — N17.9 AKI (ACUTE KIDNEY INJURY) (HCC): ICD-10-CM

## 2024-10-15 DIAGNOSIS — N13.8 BPH WITH OBSTRUCTION/LOWER URINARY TRACT SYMPTOMS: Primary | ICD-10-CM

## 2024-10-15 DIAGNOSIS — R82.90 URINE FINDING: Primary | ICD-10-CM

## 2024-10-15 DIAGNOSIS — N40.1 BPH WITH OBSTRUCTION/LOWER URINARY TRACT SYMPTOMS: Primary | ICD-10-CM

## 2024-10-15 DIAGNOSIS — I31.39 PERICARDIAL EFFUSION (HCC): ICD-10-CM

## 2024-10-15 DIAGNOSIS — R07.9 ACUTE CHEST PAIN: Primary | ICD-10-CM

## 2024-10-15 DIAGNOSIS — R82.71 BACTERIURIA: ICD-10-CM

## 2024-10-15 DIAGNOSIS — R33.9 URINARY RETENTION: ICD-10-CM

## 2024-10-15 DIAGNOSIS — R09.02 HYPOXIA: ICD-10-CM

## 2024-10-15 DIAGNOSIS — R31.21 ASYMPTOMATIC MICROSCOPIC HEMATURIA: ICD-10-CM

## 2024-10-15 DIAGNOSIS — R33.9 URINARY RETENTION: Primary | ICD-10-CM

## 2024-10-15 PROBLEM — D64.9 ANEMIA: Status: ACTIVE | Noted: 2024-10-15

## 2024-10-15 PROBLEM — R73.9 HYPERGLYCEMIA: Status: ACTIVE | Noted: 2024-10-15

## 2024-10-15 LAB
ALBUMIN SERPL-MCNC: 4.9 G/DL (ref 3.2–4.8)
ALBUMIN/GLOB SERPL: 1.3 {RATIO} (ref 1–2)
ALP LIVER SERPL-CCNC: 84 U/L
ALT SERPL-CCNC: 13 U/L
ANION GAP SERPL CALC-SCNC: 8 MMOL/L (ref 0–18)
APTT PPP: 29 SECONDS (ref 23–36)
AST SERPL-CCNC: 21 U/L (ref ?–34)
ATRIAL RATE: 69 BPM
ATRIAL RATE: 75 BPM
BASOPHILS # BLD AUTO: 0.03 X10(3) UL (ref 0–0.2)
BASOPHILS NFR BLD AUTO: 0.4 %
BILIRUB SERPL-MCNC: 0.8 MG/DL (ref 0.2–1.1)
BUN BLD-MCNC: 20 MG/DL (ref 9–23)
CALCIUM BLD-MCNC: 10 MG/DL (ref 8.7–10.4)
CHLORIDE SERPL-SCNC: 108 MMOL/L (ref 98–112)
CO2 SERPL-SCNC: 25 MMOL/L (ref 21–32)
CREAT BLD-MCNC: 1.55 MG/DL
D DIMER PPP FEU-MCNC: 1.75 UG/ML FEU (ref ?–0.85)
EGFRCR SERPLBLD CKD-EPI 2021: 44 ML/MIN/1.73M2 (ref 60–?)
EOSINOPHIL # BLD AUTO: 0.13 X10(3) UL (ref 0–0.7)
EOSINOPHIL NFR BLD AUTO: 1.9 %
ERYTHROCYTE [DISTWIDTH] IN BLOOD BY AUTOMATED COUNT: 15.4 %
EST. AVERAGE GLUCOSE BLD GHB EST-MCNC: 128 MG/DL (ref 68–126)
GLOBULIN PLAS-MCNC: 3.7 G/DL (ref 2–3.5)
GLUCOSE BLD-MCNC: 147 MG/DL (ref 70–99)
GLUCOSE BLD-MCNC: 153 MG/DL (ref 70–99)
HBA1C MFR BLD: 6.1 % (ref ?–5.7)
HCT VFR BLD AUTO: 29.4 %
HGB BLD-MCNC: 10.7 G/DL
IMM GRANULOCYTES # BLD AUTO: 0.02 X10(3) UL (ref 0–1)
IMM GRANULOCYTES NFR BLD: 0.3 %
INR BLD: 1.21 (ref 0.8–1.2)
LIPASE SERPL-CCNC: 31 U/L (ref 12–53)
LYMPHOCYTES # BLD AUTO: 0.6 X10(3) UL (ref 1–4)
LYMPHOCYTES NFR BLD AUTO: 8.8 %
MCH RBC QN AUTO: 29.5 PG (ref 26–34)
MCHC RBC AUTO-ENTMCNC: 36.4 G/DL (ref 31–37)
MCV RBC AUTO: 81 FL
MONOCYTES # BLD AUTO: 0.23 X10(3) UL (ref 0.1–1)
MONOCYTES NFR BLD AUTO: 3.4 %
NEUTROPHILS # BLD AUTO: 5.79 X10 (3) UL (ref 1.5–7.7)
NEUTROPHILS # BLD AUTO: 5.79 X10(3) UL (ref 1.5–7.7)
NEUTROPHILS NFR BLD AUTO: 85.2 %
NT-PROBNP SERPL-MCNC: 1163 PG/ML (ref ?–450)
OSMOLALITY SERPL CALC.SUM OF ELEC: 298 MOSM/KG (ref 275–295)
P AXIS: 23 DEGREES
P AXIS: 33 DEGREES
P-R INTERVAL: 196 MS
P-R INTERVAL: 206 MS
PLATELET # BLD AUTO: 202 10(3)UL (ref 150–450)
POTASSIUM SERPL-SCNC: 4.2 MMOL/L (ref 3.5–5.1)
PROT SERPL-MCNC: 8.6 G/DL (ref 5.7–8.2)
PROTHROMBIN TIME: 15.4 SECONDS (ref 11.6–14.8)
Q-T INTERVAL: 434 MS
Q-T INTERVAL: 436 MS
QRS DURATION: 88 MS
QRS DURATION: 90 MS
QTC CALCULATION (BEZET): 467 MS
QTC CALCULATION (BEZET): 484 MS
R AXIS: 25 DEGREES
R AXIS: 33 DEGREES
RBC # BLD AUTO: 3.63 X10(6)UL
SODIUM SERPL-SCNC: 141 MMOL/L (ref 136–145)
T AXIS: 119 DEGREES
T AXIS: 93 DEGREES
TROPONIN I SERPL HS-MCNC: 8 NG/L
TROPONIN I SERPL HS-MCNC: 9 NG/L
VENTRICULAR RATE: 69 BPM
VENTRICULAR RATE: 75 BPM
WBC # BLD AUTO: 6.8 X10(3) UL (ref 4–11)

## 2024-10-15 PROCEDURE — 99223 1ST HOSP IP/OBS HIGH 75: CPT | Performed by: INTERNAL MEDICINE

## 2024-10-15 PROCEDURE — 51797 INTRAABDOMINAL PRESSURE TEST: CPT | Performed by: UROLOGY

## 2024-10-15 PROCEDURE — 93306 TTE W/DOPPLER COMPLETE: CPT | Performed by: INTERNAL MEDICINE

## 2024-10-15 PROCEDURE — 71045 X-RAY EXAM CHEST 1 VIEW: CPT | Performed by: EMERGENCY MEDICINE

## 2024-10-15 PROCEDURE — 51729 CYSTOMETROGRAM W/VP&UP: CPT | Performed by: UROLOGY

## 2024-10-15 PROCEDURE — 99214 OFFICE O/P EST MOD 30 MIN: CPT | Performed by: UROLOGY

## 2024-10-15 PROCEDURE — 52000 CYSTOURETHROSCOPY: CPT | Performed by: UROLOGY

## 2024-10-15 PROCEDURE — 51784 ANAL/URINARY MUSCLE STUDY: CPT | Performed by: UROLOGY

## 2024-10-15 PROCEDURE — 71275 CT ANGIOGRAPHY CHEST: CPT | Performed by: EMERGENCY MEDICINE

## 2024-10-15 PROCEDURE — 74177 CT ABD & PELVIS W/CONTRAST: CPT | Performed by: EMERGENCY MEDICINE

## 2024-10-15 RX ORDER — NICOTINE POLACRILEX 4 MG
30 LOZENGE BUCCAL
Status: DISCONTINUED | OUTPATIENT
Start: 2024-10-15 | End: 2024-10-18

## 2024-10-15 RX ORDER — POLYETHYLENE GLYCOL 3350 17 G/17G
17 POWDER, FOR SOLUTION ORAL DAILY PRN
Status: DISCONTINUED | OUTPATIENT
Start: 2024-10-15 | End: 2024-10-18

## 2024-10-15 RX ORDER — CILOSTAZOL 100 MG/1
100 TABLET ORAL 2 TIMES DAILY
Status: DISCONTINUED | OUTPATIENT
Start: 2024-10-15 | End: 2024-10-18

## 2024-10-15 RX ORDER — HYDROMORPHONE HYDROCHLORIDE 1 MG/ML
0.5 INJECTION, SOLUTION INTRAMUSCULAR; INTRAVENOUS; SUBCUTANEOUS EVERY 30 MIN PRN
Status: DISCONTINUED | OUTPATIENT
Start: 2024-10-15 | End: 2024-10-15 | Stop reason: HOSPADM

## 2024-10-15 RX ORDER — SENNOSIDES 8.6 MG
17.2 TABLET ORAL NIGHTLY PRN
Status: DISCONTINUED | OUTPATIENT
Start: 2024-10-15 | End: 2024-10-18

## 2024-10-15 RX ORDER — ECHINACEA PURPUREA EXTRACT 125 MG
1 TABLET ORAL
Status: DISCONTINUED | OUTPATIENT
Start: 2024-10-15 | End: 2024-10-18

## 2024-10-15 RX ORDER — ONDANSETRON 2 MG/ML
4 INJECTION INTRAMUSCULAR; INTRAVENOUS ONCE
Status: COMPLETED | OUTPATIENT
Start: 2024-10-15 | End: 2024-10-15

## 2024-10-15 RX ORDER — BISACODYL 10 MG
10 SUPPOSITORY, RECTAL RECTAL
Status: DISCONTINUED | OUTPATIENT
Start: 2024-10-15 | End: 2024-10-18

## 2024-10-15 RX ORDER — ACETAMINOPHEN 500 MG
1000 TABLET ORAL EVERY 8 HOURS PRN
Status: DISCONTINUED | OUTPATIENT
Start: 2024-10-15 | End: 2024-10-18

## 2024-10-15 RX ORDER — PANTOPRAZOLE SODIUM 40 MG/1
40 TABLET, DELAYED RELEASE ORAL EVERY MORNING
COMMUNITY

## 2024-10-15 RX ORDER — FUROSEMIDE 10 MG/ML
40 INJECTION INTRAMUSCULAR; INTRAVENOUS ONCE
Status: COMPLETED | OUTPATIENT
Start: 2024-10-15 | End: 2024-10-15

## 2024-10-15 RX ORDER — ROSUVASTATIN CALCIUM 10 MG/1
10 TABLET, COATED ORAL NIGHTLY
Status: DISCONTINUED | OUTPATIENT
Start: 2024-10-15 | End: 2024-10-18

## 2024-10-15 RX ORDER — ASPIRIN 81 MG/1
81 TABLET ORAL DAILY
Status: DISCONTINUED | OUTPATIENT
Start: 2024-10-16 | End: 2024-10-18

## 2024-10-15 RX ORDER — HYDRALAZINE HYDROCHLORIDE 25 MG/1
25 TABLET, FILM COATED ORAL 2 TIMES DAILY
Status: DISCONTINUED | OUTPATIENT
Start: 2024-10-15 | End: 2024-10-17

## 2024-10-15 RX ORDER — LOSARTAN POTASSIUM 50 MG/1
50 TABLET ORAL 2 TIMES DAILY
Status: DISCONTINUED | OUTPATIENT
Start: 2024-10-15 | End: 2024-10-18

## 2024-10-15 RX ORDER — TAMSULOSIN HYDROCHLORIDE 0.4 MG/1
0.4 CAPSULE ORAL NIGHTLY
Status: DISCONTINUED | OUTPATIENT
Start: 2024-10-15 | End: 2024-10-18

## 2024-10-15 RX ORDER — METOCLOPRAMIDE HYDROCHLORIDE 5 MG/ML
2.5 INJECTION INTRAMUSCULAR; INTRAVENOUS EVERY 8 HOURS PRN
Status: DISCONTINUED | OUTPATIENT
Start: 2024-10-15 | End: 2024-10-18

## 2024-10-15 RX ORDER — NITROGLYCERIN 0.4 MG/1
0.4 TABLET SUBLINGUAL ONCE
Status: COMPLETED | OUTPATIENT
Start: 2024-10-15 | End: 2024-10-15

## 2024-10-15 RX ORDER — ONDANSETRON 2 MG/ML
4 INJECTION INTRAMUSCULAR; INTRAVENOUS EVERY 4 HOURS PRN
Status: DISCONTINUED | OUTPATIENT
Start: 2024-10-15 | End: 2024-10-15 | Stop reason: HOSPADM

## 2024-10-15 RX ORDER — ASPIRIN 81 MG/1
324 TABLET, CHEWABLE ORAL ONCE
Status: COMPLETED | OUTPATIENT
Start: 2024-10-15 | End: 2024-10-15

## 2024-10-15 RX ORDER — AMLODIPINE BESYLATE 10 MG/1
10 TABLET ORAL NIGHTLY
Status: DISCONTINUED | OUTPATIENT
Start: 2024-10-15 | End: 2024-10-17

## 2024-10-15 RX ORDER — BENZONATATE 200 MG/1
200 CAPSULE ORAL 3 TIMES DAILY PRN
Status: DISCONTINUED | OUTPATIENT
Start: 2024-10-15 | End: 2024-10-18

## 2024-10-15 RX ORDER — MELATONIN
3 NIGHTLY PRN
Status: DISCONTINUED | OUTPATIENT
Start: 2024-10-15 | End: 2024-10-18

## 2024-10-15 RX ORDER — NICOTINE POLACRILEX 4 MG
15 LOZENGE BUCCAL
Status: DISCONTINUED | OUTPATIENT
Start: 2024-10-15 | End: 2024-10-18

## 2024-10-15 RX ORDER — ONDANSETRON 2 MG/ML
4 INJECTION INTRAMUSCULAR; INTRAVENOUS EVERY 6 HOURS PRN
Status: DISCONTINUED | OUTPATIENT
Start: 2024-10-15 | End: 2024-10-18

## 2024-10-15 RX ORDER — FERROUS SULFATE 325(65) MG
TABLET, DELAYED RELEASE (ENTERIC COATED) ORAL
Status: DISCONTINUED | OUTPATIENT
Start: 2024-10-16 | End: 2024-10-18

## 2024-10-15 RX ORDER — LOSARTAN POTASSIUM 50 MG/1
50 TABLET ORAL 2 TIMES DAILY
COMMUNITY
Start: 2024-10-08

## 2024-10-15 RX ORDER — SULFAMETHOXAZOLE AND TRIMETHOPRIM 800; 160 MG/1; MG/1
1 TABLET ORAL ONCE
Status: DISCONTINUED | OUTPATIENT
Start: 2024-10-15 | End: 2024-10-15 | Stop reason: ALTCHOICE

## 2024-10-15 RX ORDER — DEXTROSE MONOHYDRATE 25 G/50ML
50 INJECTION, SOLUTION INTRAVENOUS
Status: DISCONTINUED | OUTPATIENT
Start: 2024-10-15 | End: 2024-10-18

## 2024-10-15 RX ORDER — SODIUM PHOSPHATE, DIBASIC AND SODIUM PHOSPHATE, MONOBASIC 7; 19 G/230ML; G/230ML
1 ENEMA RECTAL ONCE AS NEEDED
Status: DISCONTINUED | OUTPATIENT
Start: 2024-10-15 | End: 2024-10-18

## 2024-10-15 RX ORDER — HEPARIN SODIUM 5000 [USP'U]/ML
5000 INJECTION, SOLUTION INTRAVENOUS; SUBCUTANEOUS EVERY 8 HOURS SCHEDULED
Status: DISCONTINUED | OUTPATIENT
Start: 2024-10-15 | End: 2024-10-18

## 2024-10-15 RX ADMIN — SULFAMETHOXAZOLE AND TRIMETHOPRIM 1 TABLET: 800; 160 TABLET ORAL at 09:10:00

## 2024-10-15 NOTE — ED PROVIDER NOTES
Patient Seen in: [facility] Emergency Department      History     Chief Complaint   Patient presents with    Chest Pain Angina     Stated Complaint: chest pain started 2 hour ago    Subjective:   HPI      Patient 84-year-old male who states that about 4 hours ago around 8 AM he developed chest pain.  Patient states that his left lower lateral chest region.  Patient states it is dull ache up to 10 out of 10.  Patient states he has vomited multiple times today with it.  Patient slightly short of breath, no diarrhea, no abdominal pain.  No hematuria or dysuria.  Patient has had swollen left leg for about a week.  Patient denies cough, no fevers or chills.  Remainder of review of systems negative.    Objective:     No pertinent past medical history.            No pertinent past surgical history.              No pertinent social history.        Former smoker, no alcohol, no drugs        Physical Exam     ED Triage Vitals [10/15/24 1142]   /65   Pulse 70   Resp 18   Temp 96.8 °F (36 °C)   Temp src Temporal   SpO2 92 %   O2 Device None (Room air)       Current Vitals:   Vital Signs  BP: (!) 156/145  Pulse: 88  Resp: 15  Temp: 96.8 °F (36 °C)  Temp src: Temporal  MAP (mmHg): (!) 150    Oxygen Therapy  SpO2: 93 %  O2 Device: Nasal cannula  O2 Flow Rate (L/min): 4 L/min        Physical Exam   GENERAL: Patient resting  on the cart in no acute distress.  HEENT: Extraocular muscles intact, pupils equal round reactive to light, neck supple, no meningismus.  LUNGS: Lungs clear to auscultation bilaterally.  CARDIOVASCULAR: + S1-S2, regular rate and rhythm, no murmurs.  BACK: No CVA tenderness, no midline bony tenderness.  ABDOMEN: + Bowel sounds, soft, nontender, nondistended.  No rebound, no guarding, no hepatosplenomegaly.  EXTREMITIES: Full range of motion, no tenderness, good capillary refill.  Edema 1-2+ left more than right  SKIN: No rash, good turgor.  NEURO: Patient answers questions appropriately.  No focal  deficits appreciated.  Conversant        ED Course     Labs Reviewed   COMP METABOLIC PANEL (14) - Abnormal; Notable for the following components:       Result Value    Glucose 153 (*)     Creatinine 1.55 (*)     Calculated Osmolality 298 (*)     eGFR-Cr 44 (*)     Total Protein 8.6 (*)     Albumin 4.9 (*)     Globulin  3.7 (*)     All other components within normal limits   CBC WITH DIFFERENTIAL WITH PLATELET - Abnormal; Notable for the following components:    RBC 3.63 (*)     HGB 10.7 (*)     HCT 29.4 (*)     Lymphocyte Absolute 0.60 (*)     All other components within normal limits   PROTHROMBIN TIME (PT) - Abnormal; Notable for the following components:    PT 15.4 (*)     INR 1.21 (*)     All other components within normal limits   D-DIMER - Abnormal; Notable for the following components:    D-Dimer 1.75 (*)     All other components within normal limits   PRO BETA NATRIURETIC PEPTIDE - Abnormal; Notable for the following components:    Pro-Beta Natriuretic Peptide 1,163 (*)     All other components within normal limits   TROPONIN I HIGH SENSITIVITY - Normal   LIPASE - Normal   PTT, ACTIVATED - Normal   TROPONIN I HIGH SENSITIVITY - Normal   RAINBOW DRAW GOLD     EKG    Rate, intervals and axes as noted on EKG Report.  Rate: 75  Rhythm: Sinus Rhythm  Reading: Normal sinus rhythm, ST elevation in lead V3 but not present V2 and V4, slight change compared with previous         EKG2    Rate, intervals and axes as noted on EKG Report.  Rate: 69  Rhythm: Sinus Rhythm  Reading: Normal sinus rhythm, no acute changes compared with the first           Chest x-ray1. There is new cardiomegaly.  Recommend echocardiography to exclude pericardial effusion.   2. Prominent pulmonary vascularity and increased interstitial opacities concerning for CHF and interstitial pulmonary edema.    Independent reviewed by myself, no pneumothorax    CTA chest abdomen pelvis no PE, pericardial effusion       MDM      Patient given aspirin.   Patient given nitro.  I did speak with cardiology.  Patient was seen by cardiology did recommend admission for further evaluation.  Patient was hypoxic down to 89% placed on oxygen.  Patient was given Lasix for elevated BNP.  Patient's D-dimer was elevated.  Patient has a CTA pending.  Patient was discussed with Vlad veraist.  Patient be admitted for further evaluation.  I did consider CHF, ACS, PE.  Patient was seen earlier in the day by urology did have urologic scope procedure.  Patient did have CT abdomen pelvis ordered by urology.  Admission disposition: 10/15/2024  5:58 PM           Medical Decision Making      Disposition and Plan     Clinical Impression:  1. Acute chest pain    2. Hypoxia    3. Acute on chronic congestive heart failure, unspecified heart failure type (HCC)         Disposition:  Admit  10/15/2024  5:58 pm    Follow-up:  No follow-up provider specified.        Medications Prescribed:  Current Discharge Medication List              Supplementary Documentation:         Hospital Problems       Present on Admission  Date Reviewed: 9/22/2024            ICD-10-CM Noted POA    * (Principal) Acute chest pain R07.9 10/15/2024 Unknown    Anemia D64.9 10/15/2024 Yes    Hyperglycemia R73.9 10/15/2024 Yes

## 2024-10-15 NOTE — TELEPHONE ENCOUNTER
Please schedule this patient for surgery. Pt wants to do this soon as he is trying to get back to [place]. OK to add on somewhere if no openings.    Thanks,    NEELA Gamez    Urology Surgery Request  Surgeon: Vera  Location (if known): [facility]  Procedure: cystoscopy, insertion of SP tube   Anesthesia: General   Time Frame: ASAP  Time required: 30 minutes  Diagnosis: urinary retention    Antibiotics: per hospital protocol unless checked below   _x_ Levaquin 500 mg IV   ___ Gemcitabine 2 g/100 mL NS bladder instillation to be given in OR    Estimated Post Op/Follow Up Appt: TBD

## 2024-10-15 NOTE — ED INITIAL ASSESSMENT (HPI)
Pt wheeled into ED with daughter c/o left sided chest pain that started 2 hour, states he feels SOB d/t pain

## 2024-10-15 NOTE — ED QUICK NOTES
Orders for admission, patient is aware of plan and ready to go upstairs. Any questions, please call ED RN Vy at extension [phone].     Patient Covid vaccination status: Fully vaccinated     COVID Test Ordered in ED: None    COVID Suspicion at Admission: N/A    Running Infusions:  None    Mental Status/LOC at time of transport: AOx4    Other pertinent information:   CIWA score: N/A   NIH score:  N/A

## 2024-10-15 NOTE — CONSULTS
[facility] - CARDIOLOGY CONSULT NOTE    Lawyer Vira Mccarty Patient Status:  Emergency    1940 MRN [id number]   [facility] EMERGENCY DEPARTMENT Attending Sushant Kirk MD   Hosp Day # 0 PCP Laura Marino MD     Date of Admission:  10/15/2024  Date of Consult:  10/15/2024  I was asked by Sushant Kirk MD to provide recommendations for evaluation and management of cardiac issues.    Reason for Consultation:     Chest pain    History of Present Illness:   Patient is a 84 year old male with history of hypertension, hyperlipidemia, 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  He notes that this morning around 8 AM started to have some dull achy chest pain, which progressed and became severe 10 out of 10, then began to subside.  Other associated symptoms were nausea and vomiting.  He currently feels much better and denies any significant chest pain.  Of note had urology bladder tests performed today for urinary retention, currently has a Joe in place.  On admission troponin negative EKG J-point elevation plus changes of LVH.  Stable from prior EKG.    Of note patient has new left leg swelling which has been present last couple of weeks, primary evaluated with duplex which was negative for DVT.      Results:     Lab Results   Component Value Date    WBC 6.8 10/15/2024    HGB 10.7 (L) 10/15/2024    HCT 29.4 (L) 10/15/2024    .0 10/15/2024    CREATSERUM 1.55 (H) 10/15/2024    BUN 20 10/15/2024     10/15/2024    K 4.2 10/15/2024     10/15/2024    CO2 25.0 10/15/2024     (H) 10/15/2024    CA 10.0 10/15/2024    ALB 4.9 (H) 10/15/2024    ALKPHO 84 10/15/2024    BILT 0.8 10/15/2024    TP 8.6 (H) 10/15/2024    AST 21 10/15/2024    ALT 13 10/15/2024    T4F 0.9 2022    TSH 3.480 2022    LIP 31 10/15/2024    B12 552 2022       XR CHEST AP PORTABLE  (CPT=71045)    Result Date:  10/15/2024  CONCLUSION:  1. There is new cardiomegaly.  Recommend echocardiography to exclude pericardial effusion. 2. Prominent pulmonary vascularity and increased interstitial opacities concerning for CHF and interstitial pulmonary edema.    LOCATION:  [facility]      Dictated by (CST): Cr Rock MD on 10/15/2024 at 12:41 PM     Finalized by (CST): Cr Rock MD on 10/15/2024 at 12:42 PM      EKG 12 Lead    Result Date: 10/15/2024  Normal sinus rhythm Nonspecific T wave abnormality Prolonged QT interval or tu fusion, consider myocardial disease, electrolyte imbalance, or drug effects Abnormal ECG When compared with ECG of 24-JUN-2022 17:12, Nonspecific T wave abnormality now evident in Inferior leads    EKG    Result Date: 10/15/2024  Normal sinus rhythm Minimal voltage criteria for LVH, may be normal variant ( Kumar product ) Nonspecific T wave abnormality Prolonged QT interval or tu fusion, consider myocardial disease, electrolyte imbalance, or drug effects Abnormal ECG When compared with ECG of 15-OCT-2024 11:33, No significant change was found     Past Medical History  Past Medical History:    AMS (altered mental status)    Ataxia    Atherosclerosis of coronary artery    Diabetes (HCC)    Essential hypertension    High blood pressure    High cholesterol    Hyperlipidemia    Visual impairment       Past Surgical History  No past surgical history on file.    Family History  No family history on file.    Social History  Pediatric History   Patient Parents    Not on file     Other Topics Concern     Service Not Asked    Blood Transfusions Not Asked    Caffeine Concern Yes     Comment: coffee occ     Occupational Exposure Not Asked    Hobby Hazards Not Asked    Sleep Concern Not Asked    Stress Concern Not Asked    Weight Concern Not Asked    Special Diet Not Asked    Back Care Not Asked    Exercise No    Bike Helmet Not Asked    Seat Belt Not Asked    Self-Exams Not Asked   Social History  Narrative    Not on file           Current Medications:  No current facility-administered medications for this encounter.     (Not in a hospital admission)      Allergies  Allergies[1]    Review of Systems:   10 pt ROS performed, separate from HPI  Review of Systems:  GENERAL: no fevers, chills, sweats  HEENT: no visual or hearing changes  SKIN: denies any unusual skin lesions or rashes  RESPIRATORY: denies shortness of breath with exertion  CARDIOVASCULAR: no active chest pain, no claudication  GI: denies abdominal pain and denies heartburn  : no dysuria or hematuria  NEURO: denies headaches, focal weaknesses or paresthesias  All other systems were reviewed are negative  Physical Exam:   Blood pressure 144/67, pulse 76, temperature 96.8 °F (36 °C), temperature source Temporal, resp. rate 18, height 66\", weight 150 lb (68 kg), SpO2 96%.    Scheduled Meds:       Physical Exam:    General: Alert and oriented. No apparent distress. No respiratory or constitutional distress.  HEENT: Normocephalic, anicteric sclera, neck supple  Neck: No JVD, carotids 2+, supple  Cardiac: Regular rate. No pathologic murmur.  Lungs: Clear with normal effort.  Normal excursions and effort.  Abdomen: Soft, non-tender. BS-present.  Extremities: Without clubbing, cyanosis.  Peripheral pulsespresent.  Neurologic: Alert and oriented, normal affect. Motor ok.  Skin: Warm and dry.     Imaging: I independently visualized all relevant chest imaging in PACS, agree with radiology interpretation except where noted.  Thank you for allowing me to participate in the care of your patient.    Labs:  HEM:  Recent Labs   Lab 10/15/24  1151   WBC 6.8   HGB 10.7*   .0       Chem:  Recent Labs   Lab 10/15/24  1151      K 4.2      CO2 25.0   BUN 20   CREATSERUM 1.55*   CA 10.0   *       Recent Labs   Lab 10/15/24  1151   ALT 13   AST 21   ALB 4.9*       No results for input(s): \"TROP\", \"CK\" in the last 168 hours.    No results for  input(s): \"PTP\", \"INR\" in the last 168 hours.    Impression:   1.  Chest pain- patient has multiple risk factors for coronary artery disease, including diabetes, PVD, hypertension, hyperlipidemia.  His chest pain has now resolved his initial troponin was negative EKG without STEMI.  Will obtain repeat troponin to assess for change, however would expected to be elevated by this point since his pain started at 8 AM.  If next troponin is negative we will plan for nuclear stress test to further evaluate patient.  Will also check echo  -Ensure patient on ASA 81 mg, high intensity statin  2.  Hypertension-continue Coreg, losartan, hydralazine, amlodipine, hydrochlorothiazide  3.  Hyperlipidemia-continue Crestor  4.  PVD-continue cilostazol  5.  Diabetes melitis type II  6.  CKD stage III    Recommendations:  Check troponin now  Echo ordered  Troponin is negative we will plan for Lexiscan  Continue current medicines.    C5    Sushant Fishman MD  [facility]  10/15/2024         [1] No Known Allergies

## 2024-10-16 ENCOUNTER — APPOINTMENT (OUTPATIENT)
Dept: CT IMAGING | Facility: HOSPITAL | Age: 84
End: 2024-10-16
Attending: UROLOGY
Payer: MEDICARE

## 2024-10-16 ENCOUNTER — APPOINTMENT (OUTPATIENT)
Dept: ULTRASOUND IMAGING | Facility: HOSPITAL | Age: 84
End: 2024-10-16
Attending: INTERNAL MEDICINE
Payer: MEDICARE

## 2024-10-16 LAB
ANION GAP SERPL CALC-SCNC: 9 MMOL/L (ref 0–18)
BASOPHILS # BLD AUTO: 0.01 X10(3) UL (ref 0–0.2)
BASOPHILS NFR BLD AUTO: 0.1 %
BUN BLD-MCNC: 21 MG/DL (ref 9–23)
CALCIUM BLD-MCNC: 9.5 MG/DL (ref 8.7–10.4)
CHLORIDE SERPL-SCNC: 108 MMOL/L (ref 98–112)
CHOLEST SERPL-MCNC: 97 MG/DL (ref ?–200)
CO2 SERPL-SCNC: 25 MMOL/L (ref 21–32)
CREAT BLD-MCNC: 1.57 MG/DL
CRP SERPL-MCNC: <0.4 MG/DL (ref ?–0.5)
EGFRCR SERPLBLD CKD-EPI 2021: 43 ML/MIN/1.73M2 (ref 60–?)
EOSINOPHIL # BLD AUTO: 0 X10(3) UL (ref 0–0.7)
EOSINOPHIL NFR BLD AUTO: 0 %
ERYTHROCYTE [DISTWIDTH] IN BLOOD BY AUTOMATED COUNT: 15.6 %
ERYTHROCYTE [SEDIMENTATION RATE] IN BLOOD: 28 MM/HR
GLUCOSE BLD-MCNC: 127 MG/DL (ref 70–99)
GLUCOSE BLD-MCNC: 140 MG/DL (ref 70–99)
GLUCOSE BLD-MCNC: 148 MG/DL (ref 70–99)
GLUCOSE BLD-MCNC: 167 MG/DL (ref 70–99)
GLUCOSE BLD-MCNC: 172 MG/DL (ref 70–99)
HCT VFR BLD AUTO: 25.5 %
HDLC SERPL-MCNC: 36 MG/DL (ref 40–59)
HGB BLD-MCNC: 9 G/DL
IMM GRANULOCYTES # BLD AUTO: 0.03 X10(3) UL (ref 0–1)
IMM GRANULOCYTES NFR BLD: 0.4 %
LDLC SERPL CALC-MCNC: 49 MG/DL (ref ?–100)
LYMPHOCYTES # BLD AUTO: 0.63 X10(3) UL (ref 1–4)
LYMPHOCYTES NFR BLD AUTO: 8.1 %
MCH RBC QN AUTO: 28.8 PG (ref 26–34)
MCHC RBC AUTO-ENTMCNC: 35.3 G/DL (ref 31–37)
MCV RBC AUTO: 81.5 FL
MONOCYTES # BLD AUTO: 0.48 X10(3) UL (ref 0.1–1)
MONOCYTES NFR BLD AUTO: 6.2 %
NEUTROPHILS # BLD AUTO: 6.6 X10 (3) UL (ref 1.5–7.7)
NEUTROPHILS # BLD AUTO: 6.6 X10(3) UL (ref 1.5–7.7)
NEUTROPHILS NFR BLD AUTO: 85.2 %
NONHDLC SERPL-MCNC: 61 MG/DL (ref ?–130)
OSMOLALITY SERPL CALC.SUM OF ELEC: 299 MOSM/KG (ref 275–295)
PLATELET # BLD AUTO: 216 10(3)UL (ref 150–450)
POTASSIUM SERPL-SCNC: 4.1 MMOL/L (ref 3.5–5.1)
RBC # BLD AUTO: 3.13 X10(6)UL
SODIUM SERPL-SCNC: 142 MMOL/L (ref 136–145)
TRIGL SERPL-MCNC: 51 MG/DL (ref 30–149)
VLDLC SERPL CALC-MCNC: 7 MG/DL (ref 0–30)
WBC # BLD AUTO: 7.8 X10(3) UL (ref 4–11)

## 2024-10-16 PROCEDURE — 74176 CT ABD & PELVIS W/O CONTRAST: CPT | Performed by: UROLOGY

## 2024-10-16 PROCEDURE — 99223 1ST HOSP IP/OBS HIGH 75: CPT | Performed by: INTERNAL MEDICINE

## 2024-10-16 PROCEDURE — 99232 SBSQ HOSP IP/OBS MODERATE 35: CPT | Performed by: HOSPITALIST

## 2024-10-16 PROCEDURE — 93971 EXTREMITY STUDY: CPT | Performed by: INTERNAL MEDICINE

## 2024-10-16 RX ORDER — FLUTICASONE PROPIONATE AND SALMETEROL 250; 50 UG/1; UG/1
1 POWDER RESPIRATORY (INHALATION)
Status: DISCONTINUED | OUTPATIENT
Start: 2024-10-16 | End: 2024-10-18

## 2024-10-16 RX ORDER — FUROSEMIDE 10 MG/ML
20 INJECTION INTRAMUSCULAR; INTRAVENOUS ONCE
Status: COMPLETED | OUTPATIENT
Start: 2024-10-16 | End: 2024-10-16

## 2024-10-16 RX ORDER — CARVEDILOL 12.5 MG/1
25 TABLET ORAL 2 TIMES DAILY WITH MEALS
Status: DISCONTINUED | OUTPATIENT
Start: 2024-10-16 | End: 2024-10-18

## 2024-10-16 RX ORDER — IPRATROPIUM BROMIDE AND ALBUTEROL SULFATE 2.5; .5 MG/3ML; MG/3ML
3 SOLUTION RESPIRATORY (INHALATION)
Status: DISCONTINUED | OUTPATIENT
Start: 2024-10-16 | End: 2024-10-18

## 2024-10-16 RX ORDER — IPRATROPIUM BROMIDE AND ALBUTEROL SULFATE 2.5; .5 MG/3ML; MG/3ML
SOLUTION RESPIRATORY (INHALATION)
Status: COMPLETED
Start: 2024-10-16 | End: 2024-10-16

## 2024-10-16 NOTE — H&P
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    Patient developed chest pain this morning.  Was a severe dull ache.  Associated with nausea and emesis.  Currently has resolved.  Also more recent swelling of his left lower extremity although reports that this is somewhat chronic.  No recent illness.  No other complaints except for some exertional shortness of breath which is also recent.    History/Other:    Past Medical History:  Past Medical History:    AMS (altered mental status)    Ataxia    Atherosclerosis of coronary artery    Diabetes (HCC)    Essential hypertension    High blood pressure    High cholesterol    Hyperlipidemia    Visual impairment     Past Surgical History:   No past surgical history on file.   Family History:   No family history on file.  Social History:    reports that he has quit smoking. He has never used smokeless tobacco. He reports that he does not currently use alcohol. He reports that he does not currently use drugs.     Allergies: Allergies[1]    Medications:  Medications Ordered Prior to Encounter[2]    Review of Systems:   A comprehensive review of systems was completed.    Pertinent positives and negatives noted in the HPI.    Objective:   Physical Exam:    /73   Pulse 93   Temp 96.8 °F (36 °C) (Temporal)   Resp 25   Ht 5' 6\" (1.676 m)   Wt 150 lb (68 kg)   SpO2 94%   BMI 24.21 kg/m²   General: No acute distress, Alert  Respiratory: No rhonchi, no wheezes  Cardiovascular: S1, S2. Regular rate and rhythm  Abdomen: Soft, Non-tender,  non-distended, positive bowel sounds  Neuro: No new focal deficits  Extremities: LLE>RLE swollen       Results:    Labs:      Labs Last 24 Hours:    Recent Labs   Lab 10/15/24  1151   RBC 3.63*   HGB 10.7*   HCT 29.4*   MCV 81.0   MCH 29.5   MCHC 36.4   RDW 15.4   NEPRELIM 5.79   WBC 6.8   .0       Recent Labs   Lab 10/15/24  1151   *   BUN 20   CREATSERUM 1.55*   EGFRCR 44*   CA 10.0   ALB 4.9*      K 4.2      CO2 25.0   ALKPHO 84   AST 21   ALT 13   BILT 0.8   TP 8.6*       Lab Results   Component Value Date    INR 1.21 (H) 10/15/2024       Recent Labs   Lab 10/15/24  1151 10/15/24  1344   TROPHS 9 8       Recent Labs   Lab 10/15/24  1151   PBNP 1,163*       No results for input(s): \"PCT\" in the last 168 hours.    Imaging: Imaging data reviewed in Epic.    Assessment & Plan:      #Chest Pain  -Serial troponin  -Cardiology on consult, planning probably nuclear stress test   -Aspirin given  -telemetry     #Pericardial effusion on CT  -ECHO    #Elevated ddimer - LLE swelling  -CTA negative for PE  -repeat LLE doppler     #CKD III  -monitor      #HTN  -Amlodipine, Carvedilol, Hydralazine, Losartan     #DL  -Statin      #DMI  -ISS     #PAD  -Cilostazol    #BPH  -flomax                   Plan of care discussed with pt/family     Awa Perry DO    Supplementary Documentation:     The 21st Century Cures Act makes medical notes like these available to patients in the interest of transparency. Please be advised this is a medical document. Medical documents are intended to carry relevant information, facts as evident, and the clinical opinion of the practitioner. The medical note is intended as peer to peer communication and may appear blunt or direct. It is written in medical language and may contain abbreviations or verbiage that are unfamiliar.                                       [1] No Known Allergies  [2]   Current Facility-Administered Medications on File Prior to Encounter   Medication  Dose Route Frequency Provider Last Rate Last Admin    [COMPLETED] sulfamethoxazole-trimethoprim DS (Bactrim DS) 800-160 MG per tab 1 tablet  1 tablet Oral Once Rikki Gamez MD   1 tablet at 10/15/24 0915     Current Outpatient Medications on File Prior to Encounter   Medication Sig Dispense Refill    losartan 50 MG Oral Tab Take 1 tablet (50 mg total) by mouth 2 (two) times daily.      pantoprazole 40 MG Oral Tab EC Take 1 tablet (40 mg total) by mouth every morning.      Cholecalciferol 50 MCG (2000 UT) Oral Tab Take 1 tablet (2,000 Units total) by mouth daily.      amLODIPine 10 MG Oral Tab Take 1 tablet (10 mg total) by mouth at bedtime.      Ferrous Sulfate 325 (65 Fe) MG Oral Tab Take 1 tablet (325 mg total) by mouth daily with breakfast.      hydrALAZINE 25 MG Oral Tab Take 1 tablet (25 mg total) by mouth 2 (two) times daily.      metFORMIN 500 MG Oral Tab Take 1 tablet (500 mg total) by mouth daily with breakfast.      aspirin 81 MG Oral Tab EC Take 1 tablet (81 mg total) by mouth daily.      carvedilol 25 MG Oral Tab Take 1 tablet (25 mg total) by mouth 2 (two) times daily.      cilostazol 100 MG Oral Tab Take 1 tablet (100 mg total) by mouth 2 (two) times daily.      rosuvastatin 10 MG Oral Tab Take 1 tablet (10 mg total) by mouth nightly.      tamsulosin 0.4 MG Oral Cap Take 1 capsule (0.4 mg total) by mouth nightly.

## 2024-10-16 NOTE — PLAN OF CARE
Assumed care of patient around 0730.Patient AXOX4.On O2 4L/NC.NSR on tele.No c/o chest pain or nausea and vomiting but with poor appetite.Continent of bowel.Joe catheter intact and draining.Daughter at beside.Plan of care updated.Call light within reach.  POC:  US LLE.  Stress test.  Pulm Consult.  Wean O2 as able.  PT/OT eval.  Problem: Patient/Family Goals  Goal: Patient/Family Long Term Goal  Description: Patient's Long Term Goal: discharge home    Interventions:  - Hospitalist/Cards, labs, trops, chest xray, CT chest/abd/pelvis, prn antiemetics, ECHO, stress test  - See additional Care Plan goals for specific interventions  Outcome: Progressing  Goal: Patient/Family Short Term Goal  Description: Patient's Short Term Goal: resolve n/v, control pain    Interventions:   - prn zofran/reglan, nitroglycerin, aspirin, lasix  - See additional Care Plan goals for specific interventions  Outcome: Progressing     Problem: Diabetes/Glucose Control  Goal: Glucose maintained within prescribed range  Description: INTERVENTIONS:  - Monitor Blood Glucose as ordered  - Assess for signs and symptoms of hyperglycemia and hypoglycemia  - Administer ordered medications to maintain glucose within target range  - Assess barriers to adequate nutritional intake and initiate nutrition consult as needed  - Instruct patient on self management of diabetes  Outcome: Progressing

## 2024-10-16 NOTE — PROGRESS NOTES
[facility]   part of [facility]     Hospitalist Progress Note     Lawyer Vira Mccarty Patient Status:  Inpatient    1940 MRN [id number]   Location [facility] 2NE-A Attending Gualberto Braga,    Hosp Day # 1 PCP Laura Marino MD     Chief Complaint: Chest pain    Subjective:     Patient seen and examined. Denies CP/palpitations/SOB.     Objective:    Review of Systems:   A comprehensive review of systems was completed; pertinent positive and negatives stated in subjective.    Vital signs:  Temp:  [97.8 °F (36.6 °C)-98.6 °F (37 °C)] 98.6 °F (37 °C)  Pulse:  [78-99] 78  Resp:  [15-25] 20  BP: (130-156)/() 130/74  SpO2:  [90 %-94 %] 94 %    Physical Exam:    General: No acute distress  Respiratory: Diminished bilaterally.   Cardiovascular: S1, S2, regular rate and rhythm  Abdomen: Soft, Non-tender, non-distended, positive bowel sounds  Neuro: No new focal deficits.   Extremities: No edema    Diagnostic Data:    Labs:  Recent Labs   Lab 10/15/24  1151 10/15/24  1223 10/16/24  0623   WBC 6.8  --  7.8   HGB 10.7*  --  9.0*   MCV 81.0  --  81.5   .0  --  216.0   INR  --  1.21*  --        Recent Labs   Lab 10/15/24  1151 10/16/24  0623   * 127*   BUN 20 21   CREATSERUM 1.55* 1.57*   CA 10.0 9.5   ALB 4.9*  --     142   K 4.2 4.1    108   CO2 25.0 25.0   ALKPHO 84  --    AST 21  --    ALT 13  --    BILT 0.8  --    TP 8.6*  --        Estimated Creatinine Clearance: 31 mL/min (A) (based on SCr of 1.57 mg/dL (H)).    Recent Labs   Lab 10/15/24  1151 10/15/24  1344   TROPHS 9 8       Recent Labs   Lab 10/15/24  1223   PTP 15.4*   INR 1.21*                  Microbiology    No results found for this visit on 10/15/24.      Imaging: Reviewed in Epic.    Medications:    carvedilol  25 mg Oral BID with meals    amLODIPine  10 mg Oral Nightly    aspirin  81 mg Oral Daily    cilostazol  100 mg Oral BID    ferrous sulfate   Oral Daily with breakfast    hydrALAZINE  25 mg Oral BID     losartan  50 mg Oral BID    rosuvastatin  10 mg Oral Nightly    tamsulosin  0.4 mg Oral Nightly    heparin  5,000 Units Subcutaneous Q8H ELVIE    insulin aspart  2-10 Units Subcutaneous TID AC and HS       Assessment & Plan:      #Chest Pain  -Serial troponin negative  -Cardiology following  -Aspirin given  -telemetry      #Pericardial effusion on CT  -ECHO reviewed  -ESR slightly elevated, CRP WNL  -Cardiology following     #Elevated ddimer - LLE swelling  -CTA negative for PE  -Pending LLE doppler     #Hypoxia  -? Secondary to emphysema - patient quit smoking 40 years ago  -Desaturated to 84% when placed on RA  -CTA chest without PE/effusion/PNA  -Pulmonary consulted      #CKD III  -monitor      #HTN  -Amlodipine, Carvedilol, Hydralazine, Losartan     #DL  -Statin      #DMI  -ISS     #PAD  -Cilostazol     #BPH  -flomax      Gualberto Braga DO    Supplementary Documentation:     Quality:  DVT Mechanical Prophylaxis:     Early ambuation  DVT Pharmacologic Prophylaxis   Medication    heparin (Porcine) 5000 UNIT/ML injection 5,000 Units         DVT Pharmacologic prophylaxis: Aspirin 81 mg      Code Status: Not on file  Joe: Joe catheter in place  Joe Duration (in days): 2  Central line:    ARTHUR: 10/17/2024    Discharge is dependent on: clinical progress  At this point Mr. Constantino is expected to be discharge to: home    The 21st Century Cures Act makes medical notes like these available to patients in the interest of transparency. Please be advised this is a medical document. Medical documents are intended to carry relevant information, facts as evident, and the clinical opinion of the practitioner. The medical note is intended as peer to peer communication and may appear blunt or direct. It is written in medical language and may contain abbreviations or verbiage that are unfamiliar.              **Certification      PHYSICIAN Certification of Need for Inpatient Hospitalization - Initial Certification    Patient  will require inpatient services that will reasonably be expected to span two midnight's based on the clinical documentation in H+P.   Based on patients current state of illness, I anticipate that, after discharge, patient will require TBD.

## 2024-10-16 NOTE — PROGRESS NOTES
NURSING ADMISSION NOTE      Patient admitted via Cart  Oriented to room.  Safety precautions initiated.  Bed in low position.  Call light in reach.    Assumed care for this patient at 2045. Admission navigator/PTA med list complete. Patient alert and oriented x4. Up with assist/walker. NSR/ST on tele. Maintaining o2 sats >90% on 4L NC. Denies SOB. Joe in place. Skin intact. Updated patient and patient's daughters at bedside, verbalized understanding. Safety precautions put in place, bed alarm on.

## 2024-10-16 NOTE — PLAN OF CARE
Problem: Diabetes/Glucose Control  Goal: Glucose maintained within prescribed range  Description: INTERVENTIONS:  - Monitor Blood Glucose as ordered  - Assess for signs and symptoms of hyperglycemia and hypoglycemia  - Administer ordered medications to maintain glucose within target range  - Assess barriers to adequate nutritional intake and initiate nutrition consult as needed  - Instruct patient on self management of diabetes  Outcome: Progressing     Problem: Patient/Family Goals  Goal: Patient/Family Long Term Goal  Description: Patient's Long Term Goal: discharge home    Interventions:  - Hospitalist/Cards, labs, trops, chest xray, CT chest/abd/pelvis, prn antiemetics, ECHO, stress test, US kidney  - See additional Care Plan goals for specific interventions  Outcome: Progressing  Goal: Patient/Family Short Term Goal  Description: Patient's Short Term Goal: resolve n/v, control pain    Interventions:   - prn zofran/reglan, nitroglycerin, aspirin, lasix  - See additional Care Plan goals for specific interventions  Outcome: Progressing

## 2024-10-16 NOTE — TELEPHONE ENCOUNTER
Spoke with the patient.  Scheduling the surgery for 10/24/24.  Reviewed the pre-op instructions and will send via My Chart or mail to patient once confirmed.  Patient can contact me at [phone]

## 2024-10-16 NOTE — CONSULTS
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  He is being worked up by cardiology for this.  Of note, patient has been requiring 4 L of oxygen.  He denies feeling sick, fevers, chills, or cough.  No diagnosis of COPD.  Patient reports no significant breathing issues prior to this.  Patient is a bit of a poor historian, reports that he quit smoking 20 to 30 years ago, and smoked for about 20 years.    History:  Past Medical History:    AMS (altered mental status)    Ataxia    Atherosclerosis of coronary artery    Diabetes (HCC)    Essential hypertension    High blood pressure    High cholesterol    Hyperlipidemia    Visual impairment     No past surgical history on file.  No family history on file.   reports that he has quit smoking. He has never used smokeless tobacco. He reports that he does not currently use alcohol. He reports that he does not currently use drugs.    Allergies:  Allergies[1]    Medications:    Current Facility-Administered Medications:     carvedilol (Coreg) tab 25 mg, 25 mg, Oral, BID with meals    fluticasone-salmeterol (Advair Diskus) 250-50 MCG/ACT inhaler 1 puff, 1 puff, Inhalation, BID **AND** umeclidinium bromide (Incruse Ellipta) 62.5 MCG/ACT inhaler 1 puff, 1 puff, Inhalation, Daily    ipratropium-albuterol (Duoneb) 0.5-2.5 (3) MG/3ML inhalation solution 3 mL, 3 mL, Nebulization, Q6H WA    amLODIPine (Norvasc) tab 10 mg, 10 mg, Oral, Nightly    aspirin DR tab 81 mg, 81 mg, Oral, Daily    cilostazol (Pletal) tab 100 mg, 100 mg, Oral, BID    ferrous sulfate DR tab, , Oral, Daily with breakfast    hydrALAZINE (Apresoline) tab 25 mg,  25 mg, Oral, BID    losartan (Cozaar) tab 50 mg, 50 mg, Oral, BID    rosuvastatin (Crestor) tab 10 mg, 10 mg, Oral, Nightly    tamsulosin (Flomax) cap 0.4 mg, 0.4 mg, Oral, Nightly    glucose (Dex4) 15 GM/59ML oral liquid 15 g, 15 g, Oral, Q15 Min PRN **OR** glucose (Glutose) 40% oral gel 15 g, 15 g, Oral, Q15 Min PRN **OR** glucose-vitamin C (Dex-4) chewable tab 4 tablet, 4 tablet, Oral, Q15 Min PRN **OR** dextrose 50% injection 50 mL, 50 mL, Intravenous, Q15 Min PRN **OR** glucose (Dex4) 15 GM/59ML oral liquid 30 g, 30 g, Oral, Q15 Min PRN **OR** glucose (Glutose) 40% oral gel 30 g, 30 g, Oral, Q15 Min PRN **OR** glucose-vitamin C (Dex-4) chewable tab 8 tablet, 8 tablet, Oral, Q15 Min PRN    acetaminophen (Tylenol Extra Strength) tab 1,000 mg, 1,000 mg, Oral, Q8H PRN    melatonin tab 3 mg, 3 mg, Oral, Nightly PRN    heparin (Porcine) 5000 UNIT/ML injection 5,000 Units, 5,000 Units, Subcutaneous, Q8H ELVIE    ondansetron (Zofran) 4 MG/2ML injection 4 mg, 4 mg, Intravenous, Q6H PRN    metoclopramide (Reglan) 5 mg/mL injection 2.5 mg, 2.5 mg, Intravenous, Q8H PRN    polyethylene glycol (PEG 3350) (Miralax) 17 g oral packet 17 g, 17 g, Oral, Daily PRN    sennosides (Senokot) tab 17.2 mg, 17.2 mg, Oral, Nightly PRN    bisacodyl (Dulcolax) 10 MG rectal suppository 10 mg, 10 mg, Rectal, Daily PRN    fleet enema (Fleet) rectal enema 133 mL, 1 enema, Rectal, Once PRN    insulin aspart (NovoLOG) 100 Units/mL FlexPen 2-10 Units, 2-10 Units, Subcutaneous, TID AC and HS    benzonatate (Tessalon) cap 200 mg, 200 mg, Oral, TID PRN    guaiFENesin (Robitussin) 100 MG/5 ML oral liquid 200 mg, 200 mg, Oral, Q4H PRN    glycerin-hypromellose- (Artificial Tears) 0.2-0.2-1 % ophthalmic solution 1 drop, 1 drop, Both Eyes, QID PRN    sodium chloride (Saline Mist) 0.65 % nasal solution 1 spray, 1 spray, Each Nare, Q3H PRN    influenza virus trivalent high dose PF (Fluzone HD) 0.5 mL injection (ages >/= 65 years) 0.5 mL, 0.5 mL,  Intramuscular, Prior to discharge    Review of Systems:   All other review of systems are negative.    Vital signs in last 24 hours:  Patient Vitals for the past 24 hrs:   BP Temp Temp src Pulse Resp SpO2 Weight   10/16/24 1259 -- -- -- -- -- 94 % --   10/16/24 1151 130/74 98.6 °F (37 °C) Oral 78 20 90 % --   10/16/24 0824 137/65 98.5 °F (36.9 °C) Oral 83 22 93 % --   10/16/24 0436 142/61 97.8 °F (36.6 °C) Oral 87 22 93 % --   10/15/24 2300 154/65 98.6 °F (37 °C) Oral 96 20 94 % --   10/15/24 2059 139/76 -- -- 98 -- -- 149 lb 9.6 oz (67.9 kg)   10/15/24 2055 154/74 -- -- 99 -- -- --   10/15/24 2051 144/68 -- Oral 95 22 -- --   10/15/24 1800 154/73 -- -- 93 25 94 % --   10/15/24 1715 (!) 156/145 -- -- 88 -- 93 % --       Intake/Output:    Intake/Output Summary (Last 24 hours) at 10/16/2024 1622  Last data filed at 10/16/2024 0824  Gross per 24 hour   Intake 360 ml   Output 1200 ml   Net -840 ml       Physical Exam:   General: alert, cooperative, oriented.  No respiratory distress.   Head: Normocephalic, without obvious abnormality, atraumatic.   Eyes: Conjunctivae/corneas clear.  No scleral icterus.  No conjunctival     hemorrhage.   Nose: Nares normal.   Throat: Lips, mucosa, and tongue normal.  No thrush noted.   Neck: Soft, supple neck; trachea midline, no adenopathy, no thyromegaly.   Lungs: clear to auscultation bilaterally   Chest wall: No tenderness or deformity.   Heart: Regular rate and rhythm, normal S1S2, no murmur.   Abdomen: soft, non-tender, non-distended, no masses, no guarding, no     rebound, positive BS.   Extremity: no edema, no cyanosis   Skin: No rashes or lesions.   Neurological: Alert, interactive, no focal deficits    Lab Data Review:  Recent Labs   Lab 10/15/24  1151 10/16/24  0623   WBC 6.8 7.8   HGB 10.7* 9.0*   HCT 29.4* 25.5*   .0 216.0       Recent Labs   Lab 10/15/24  1151 10/16/24  0623    142   K 4.2 4.1    108   CO2 25.0 25.0   BUN 20 21   ALT 13  --    AST 21  --         No results for input(s): \"MG\" in the last 168 hours.    No results found for: \"PHOS\", \"PHOSPHORUS\"     Recent Labs   Lab 10/15/24  1223   INR 1.21*   PTT 29.0       No results for input(s): \"ABGPHT\", \"IRMHVF6Z\", \"LXFPD6T\", \"ABGHCO3\", \"ABGBE\", \"TEMP\", \"COURTNEY\", \"SITE\", \"DEV\", \"THGB\" in the last 168 hours.    Invalid input(s): \"SXK00BML\", \"CHOB\"    Invalid input(s): \"CKTOTAL\", \"TROPONINI\", \"TROPONINT\", \"CKMBINDEX\"      Cultures:   No results found for this visit on 10/15/24.    Radiology:  US VENOUS DOPPLER LEG LEFT - DIAG IMG (CPT=93971)  Narrative: PROCEDURE:  US VENOUS DOPPLER LEG LEFT - DIAG IMG (CPT=93971)     COMPARISON:  [facility], US VENOUS DOPPLER LEG LEFT - DIAG IMG (CPT=93971), 9/30/2024, 7:18 PM.     INDICATIONS:  Patient presents with left lower extremity swelling and elevated D-dimer levels.     TECHNIQUE:  Real time, grey scale, and duplex ultrasound was used to evaluate the lower extremity venous system. B-mode two-dimensional images of the vascular structures, Doppler spectral analysis, and color flow.  Doppler imaging were performed.  The   following veins were imaged:  Common, deep, and superficial femoral, popliteal, sapheno-femoral junction, posterior tibial veins, and the contralateral common femoral vein.     PATIENT STATED HISTORY: (As transcribed by Technologist)           FINDINGS:    EXTREMITY EXAMINED:  Left lower extremity  SAPHENOFEMORAL JUNCTION:  No reflux.  THROMBI:  None visible.  COMPRESSION:  Normal compressibility, phasicity, and augmentation.  OTHER:  Negative.                   Impression: CONCLUSION:  Negative exam, no evidence of left lower extremity DVT.        LOCATION:  [facility]           Dictated by (CST): Fabrizio Camacho DO on 10/16/2024 at 3:17 PM       Finalized by (CST): Fabrizio Camacho DO on 10/16/2024 at 3:18 PM     CT ABDOMEN+PELVIS KIDNEYSTONE 2D RNDR(NO IV,NO ORAL)(CPT=74176)  Narrative: PROCEDURE:  CT ABDOMEN+PELVIS KIDNEYSTONE 2D RNDR(NO IV,NO ORAL)(CPT=74176)      COMPARISON:  [facility] , CT, CTA CHEST + CT ABD (W) + CT PEL (W) SH(CPT=71275/35209), 10/15/2024, 4:49 PM.     INDICATIONS:  recurrent UTIs     TECHNIQUE:  Unenhanced multislice CT scanning from above the kidneys to below the urinary bladder.  2D rendering are generated on the CT scanner workstation to localize potential stones in the cranio-caudal plane.  Dose reduction techniques were used.   Dose information is transmitted to the ACR (American College of Radiology) NRDR (National Radiology Data Registry) which includes the Dose Index Registry.     PATIENT STATED HISTORY: (As transcribed by Technologist)  Recurrent UTIs.         FINDINGS:    KIDNEYS:  Multiple nonobstructing calcifications in bilateral renal collecting systems.  No hydronephrosis.  BLADDER:  Joe catheter in the bladder is noted.  ADRENALS:  Adrenal gland thickening is noted.  LIVER:  No enlargement, atrophy, abnormal density, or significant focal lesion.    BILIARY:  Contrast in the gallbladder is is likely due to vicarious excretion of contrast.  PANCREAS:  No lesion, fluid collection, ductal dilatation, or atrophy.    SPLEEN:  No enlargement or focal lesion.    AORTA/VASCULAR:  Vascular calcifications are noted.  RETROPERITONEUM:  No mass or adenopathy.    BOWEL/MESENTERY:  Diverticulosis of the colon without evidence of acute diverticulitis.  The appendix is normal.  ABDOMINAL WALL:  No mass or hernia.    BONES:  No bony lesion or fracture.  PELVIC ORGANS:  Prostate gland measures 6.3 cm.   LUNG BASES:  Small bilateral pleural effusions along with atelectasis and consolidation.  Small pericardial effusion.  OTHER:  Negative.                     Impression: CONCLUSION:       1. Small bilateral pericardial effusions with atelectasis and consolidations at the lung bases may be due to fluid overload.  Aspiration is of consideration.     2. Small pericardial effusion is noted.     3. Nonobstructing calculi in both kidneys.  No hydronephrosis.      4. Enlarged prostate gland is noted.     5. Joe catheter within the decompressed bladder is noted.  Bladder wall thickening is again suggested.             LOCATION:  [facility]        Dictated by (CST): Vivek Alston MD on 10/16/2024 at 3:04 PM       Finalized by (CST): Vivek Alston MD on 10/16/2024 at 3:08 PM         Assessment:  Acute hypoxic respiratory failure, CT scan was reviewed, no evidence of pneumonia but does have moderate emphysema, no wheezing on exam to suggest COPD exacerbation  Chest pain with pericardial effusion on CT scan, cardiology is following  CKD stage III  Hypertension  Hyperlipidemia  Peripheral arterial disease        Plan:  CT scan was reviewed in detail with patient  Close monitoring of oxygenation status, wean as able, goal saturation 88 to 92%  Start equivalent of Trelegy  Start scheduled bronchodilators with DuoNebs  Check bedside spirometry  Patient not wheezing nor having any respiratory compromise, will hold off on steroids for now    Thank you for the consultation.  Will follow with you.    Jennifer Hutson MD  [facility]  Office: [phone]         [1] No Known Allergies

## 2024-10-16 NOTE — PLAN OF CARE
Problem: Diabetes/Glucose Control  Goal: Glucose maintained within prescribed range  Description: INTERVENTIONS:  - Monitor Blood Glucose as ordered  - Assess for signs and symptoms of hyperglycemia and hypoglycemia  - Administer ordered medications to maintain glucose within target range  - Assess barriers to adequate nutritional intake and initiate nutrition consult as needed  - Instruct patient on self management of diabetes  10/16/2024 0501 by Pema Guerrero RN  Outcome: Progressing  10/16/2024 0500 by Pema Guerrero RN  Outcome: Progressing     Problem: Patient/Family Goals  Goal: Patient/Family Long Term Goal  Description: Patient's Long Term Goal: discharge home    Interventions:  - Hospitalist/Cards, labs, trops, chest xray, CT chest/abd/pelvis, prn antiemetics, ECHO, stress test  - See additional Care Plan goals for specific interventions  10/16/2024 0501 by Pema Guerrero RN  Outcome: Progressing  10/16/2024 0500 by Pema Guerrero RN  Outcome: Progressing  Goal: Patient/Family Short Term Goal  Description: Patient's Short Term Goal: resolve n/v, control pain    Interventions:   - prn zofran/reglan, nitroglycerin, aspirin, lasix  - See additional Care Plan goals for specific interventions  Outcome: Progressing

## 2024-10-16 NOTE — PROGRESS NOTES
Progress Note   Vira Martins. Patient Status:  Inpatient    1940 MRN [id number]   Location [facility] 2NE-A Attending Gualberto Braga,    Hosp Day # 1 PCP Laura Marino MD     Subjective:  Remains on 4L O2  Denies chest pain, shortness of breath    Objective:  /65 (BP Location: Right arm)   Pulse 83   Temp 98.5 °F (36.9 °C) (Oral)   Resp 22   Ht 5' 6\" (1.676 m)   Wt 149 lb 9.6 oz (67.9 kg)   SpO2 93%   BMI 24.15 kg/m²     Telemetry: SR, 80s    Intake/Output:    Intake/Output Summary (Last 24 hours) at 10/16/2024 1021  Last data filed at 10/16/2024 0651  Gross per 24 hour   Intake --   Output 1200 ml   Net -1200 ml     Last 3 Weights   10/15/24 2059 149 lb 9.6 oz (67.9 kg)   10/15/24 1142 150 lb (68 kg)   22 1032 147 lb (66.7 kg)   22 2200 147 lb 1.6 oz (66.7 kg)   22 1700 150 lb (68 kg)     Labs:  Recent Labs   Lab 10/15/24  1151 10/16/24  0623   * 127*   BUN 20 21   CREATSERUM 1.55* 1.57*   EGFRCR 44* 43*   CA 10.0 9.5    142   K 4.2 4.1    108   CO2 25.0 25.0     Recent Labs   Lab 10/15/24  1151 10/16/24  0623   RBC 3.63* 3.13*   HGB 10.7* 9.0*   HCT 29.4* 25.5*   MCV 81.0 81.5   MCH 29.5 28.8   MCHC 36.4 35.3   RDW 15.4 15.6   NEPRELIM 5.79 6.60   WBC 6.8 7.8   .0 216.0     Recent Labs   Lab 10/15/24  1151 10/15/24  1344   TROPHS 9 8     No results found for: \"CHOLESTEROL\", \"HDL\", \"LDL\", \"TRIG\", \"NONHDL\", \"CHOLHDL\"  Lab Results   Component Value Date    DDIMER 1.75 (H) 10/15/2024     Lab Results   Component Value Date    TSH 3.480 2022     Review of Systems:   Constitutional: No fevers, chills, fatigue or night sweats.  ENT: No mouth pain, neck pain, running nose, headaches or swollen glands.  Skin: No rashes, pruritus or skin changes,  Respiratory: Denies cough, wheezing or shortness of breath.  CV: Denies chest pain, palpitations, orthopnea, PND or dizziness.  Musculoskeletal: No joint pain, stiffness or swelling.  GI: No  nausea, vomiting or diarrhea. No blood in stools.  Neurologic: No seizures, tremors, weakness or numbness.     Physical Exam:  General: Alert, cooperative, no distress, appears stated age.  Neck: Supple, symmetrical, trachea midline, no adenopathy, thyroid: no enlargment/tenderness/nodules, no carotid bruit and no JVD.  Lungs: bibasilar expiratory wheezes  Chest wall: No tenderness or deformity.  Heart: Regular rate and rhythm, S1, S2 normal, no murmur, click, rub or gallop.  Abdomen: Soft, non-tender. Bowel sounds normal. No masses,  No organomegaly.  Extremities: Extremities normal, atraumatic, no cyanosis, mild BLE edema.  Pulses: 2+ and symmetric all extremities.  Neurologic: Grossly intact.    Diagnostics:  CTA CHEST + CT ABD (W) + CT PEL (W) (CPT=71275/06908)    Result Date: 10/15/2024  CONCLUSION:  There is no pulmonary embolism to the first subsegmental arterial level.  There is prominent circumferential mural thickening of the urinary bladder which may represent cystitis.  A neoplastic process cannot be entirely excluded.  Clinical correlation and/or correlation with direct visualization is recommended.  Moderate pericardial effusion is present.  Consider correlation with echocardiogram.  Nonobstructing right renal calculi are present.     LOCATION:  [facility]   Dictated by (CST): Nathan Hutton MD on 10/15/2024 at 5:30 PM     Finalized by (CST): Nathan Hutton MD on 10/15/2024 at 5:42 PM       XR CHEST AP PORTABLE  (CPT=71045)    Result Date: 10/15/2024  CONCLUSION:  1. There is new cardiomegaly.  Recommend echocardiography to exclude pericardial effusion. 2. Prominent pulmonary vascularity and increased interstitial opacities concerning for CHF and interstitial pulmonary edema.    LOCATION:  [facility]      Dictated by (CST): Cr Rock MD on 10/15/2024 at 12:41 PM     Finalized by (CST): Cr Rock MD on 10/15/2024 at 12:42 PM      Echo: 10/15/24  Conclusions:     1. Left ventricle: The cavity size was  normal. Wall thickness was mildly      increased. Systolic function was hyperdynamic. The estimated ejection      fraction was 75-80%, by visual assessment. No diagnostic evidence for      regional wall motion abnormalities. Left ventricular diastolic function      parameters were normal for the patient's age.   2. Left atrium: The left atrial volume was moderately increased.   3. Mitral valve: The annulus was moderately calcified. Transvalvular      velocity was increased, due to stenosis. The findings were consistent      with moderate to severe stenosis. The mean diastolic gradient was 12mm Hg      at a HR of 81 bpm.   4. Pulmonary arteries: Systolic pressure was mildly increased, in the range      of 35mm Hg to 40mm Hg.   5. Pericardium, extracardiac: A moderate pericardial effusion was      identified. There was no evidence of hemodynamic compromise.   Impressions:  No previous study was available for comparison.   *   Medications:   amLODIPine  10 mg Oral Nightly    aspirin  81 mg Oral Daily    cilostazol  100 mg Oral BID    ferrous sulfate   Oral Daily with breakfast    hydrALAZINE  25 mg Oral BID    losartan  50 mg Oral BID    rosuvastatin  10 mg Oral Nightly    tamsulosin  0.4 mg Oral Nightly    heparin  5,000 Units Subcutaneous Q8H ELVIE    insulin aspart  2-10 Units Subcutaneous TID AC and HS     Assessment:    84 year old male with PMH of HTN, HLD, PAD s/p amputation of left big toe, CKD3, DMII who presented with chest pain.    Acute Chest pain   Troponin negative x2 on admission   -CXR notable for cardiomegaly, interstitial pulmonary edema  -proBNP 1,163  -CTA chest negative for PE, revealed moderate pericardial effusion, Echo confirmed moderate effusion without evidence of tamponade, stable BP  -IV lasix given x 1 dose yesterday with 1.2L out, will continue diuresis, Cr stable  -D-dimer elevated 1.75 2/2 effusion  -hold off on lexiscan with current findings of effusion, may consider OP given risk  factors    Moderate pericardial effusion   Admitted with chest pain   -CTA/echo revealed moderate pericardial effusion  -Echo revealed LVEF 75-80%, no RWMA, PASP 35-40mmHg, moderate to severe mitral stenosis  -D-dimer 1.75  -ESR/CRP pending  -IV lasix given with good response, will continue  -Hgb dropped 1g overnight, 10.7 on admission, today 9.0  -WBC unremarkable  -remains on supplemental O2 4L    Moderate to severe Mitral Stenosis  Mean gradient 12mmHg  -will monitor, may consider valve evaluation    HTN  -150s  -amlodipine, losartan  -not on home coreg, hydralazine,, will resume with slightly higher BP    HLD  Rosuvastatin  -will check lipid panel     DMII-A1c 6.1, metformin PTA    PAD  S/p left big toe amputation  -cilostazol, asa, statin    Leg swelling  Duplex negative for DVT    CKD3  Cr stable 1.5  -continue IV diuresis with moderate effusion     Urinary Retention   Joe in place    Plan:  -Monitor strict I/Os, daily weights, daily BMP  -Resume home coreg dosing for better BP control   -Continue amlodipine, losartan  -Continue asa, statin, cilastazol  -add on labs pending  -monitor daily CBC    Plan of care discussed with patient, RN.    LENO Tello  10/16/2024  10:21 AM  [phone] [place]  [phone] [place]        Patient seen and examined independently.  Note reviewed and labs reviewed.  Agree to the assessment and plan with the following additions/changes.  Entire medical decision making & plan performed by myself.    General: NAD.  HEENT: Normocephalic.  Neck: Supple.  Cardiac: RRR. Normal S1, S2 . No murmur.  Lungs: DIminished  Extremities: No edema.    A/P:  Atypical CP initially. Now resolved. ECG & Trp negative.  CT Chest showed moderate pericardial effusion, which was seen on echo and can be monitored clinically since it is not of hemodynamic effect. Interestingly, he also has severe mitral stenosis based on mean gradient of 12 at HR of 80. This could be functional in the  setting of a dynamic LV.  At present, his hypoxia and pericardial effusion are of unclear etiology. Clinically not consistent with CHF. Bronchitis or Viral syndrome (?)  Favor deferring any stress testing until he recovers.   Hold lasix. Resume home BB.  Reassess clinically tomorrow. Low threshold to involve pulmonary medicine,     LOC L3    Beto Telles MD  10/16/2024  Interventional Cardiology  [facility]  =======================================================

## 2024-10-17 LAB
ANION GAP SERPL CALC-SCNC: 8 MMOL/L (ref 0–18)
BASOPHILS # BLD AUTO: 0.01 X10(3) UL (ref 0–0.2)
BASOPHILS NFR BLD AUTO: 0.1 %
BUN BLD-MCNC: 18 MG/DL (ref 9–23)
CALCIUM BLD-MCNC: 10 MG/DL (ref 8.7–10.4)
CHLORIDE SERPL-SCNC: 104 MMOL/L (ref 98–112)
CO2 SERPL-SCNC: 28 MMOL/L (ref 21–32)
CREAT BLD-MCNC: 1.39 MG/DL
EGFRCR SERPLBLD CKD-EPI 2021: 50 ML/MIN/1.73M2 (ref 60–?)
EOSINOPHIL # BLD AUTO: 0.02 X10(3) UL (ref 0–0.7)
EOSINOPHIL NFR BLD AUTO: 0.2 %
ERYTHROCYTE [DISTWIDTH] IN BLOOD BY AUTOMATED COUNT: 15.4 %
GLUCOSE BLD-MCNC: 110 MG/DL (ref 70–99)
GLUCOSE BLD-MCNC: 125 MG/DL (ref 70–99)
GLUCOSE BLD-MCNC: 142 MG/DL (ref 70–99)
GLUCOSE BLD-MCNC: 173 MG/DL (ref 70–99)
GLUCOSE BLD-MCNC: 183 MG/DL (ref 70–99)
HCT VFR BLD AUTO: 28.9 %
HGB BLD-MCNC: 10.8 G/DL
IMM GRANULOCYTES # BLD AUTO: 0.05 X10(3) UL (ref 0–1)
IMM GRANULOCYTES NFR BLD: 0.6 %
LYMPHOCYTES # BLD AUTO: 0.57 X10(3) UL (ref 1–4)
LYMPHOCYTES NFR BLD AUTO: 6.7 %
MCH RBC QN AUTO: 30.1 PG (ref 26–34)
MCHC RBC AUTO-ENTMCNC: 37.4 G/DL (ref 31–37)
MCV RBC AUTO: 80.5 FL
MONOCYTES # BLD AUTO: 0.51 X10(3) UL (ref 0.1–1)
MONOCYTES NFR BLD AUTO: 6 %
NEUTROPHILS # BLD AUTO: 7.34 X10 (3) UL (ref 1.5–7.7)
NEUTROPHILS # BLD AUTO: 7.34 X10(3) UL (ref 1.5–7.7)
NEUTROPHILS NFR BLD AUTO: 86.4 %
OSMOLALITY SERPL CALC.SUM OF ELEC: 294 MOSM/KG (ref 275–295)
PLATELET # BLD AUTO: 206 10(3)UL (ref 150–450)
POTASSIUM SERPL-SCNC: 3.5 MMOL/L (ref 3.5–5.1)
POTASSIUM SERPL-SCNC: 4.3 MMOL/L (ref 3.5–5.1)
RBC # BLD AUTO: 3.59 X10(6)UL
SODIUM SERPL-SCNC: 140 MMOL/L (ref 136–145)
WBC # BLD AUTO: 8.5 X10(3) UL (ref 4–11)

## 2024-10-17 PROCEDURE — 99232 SBSQ HOSP IP/OBS MODERATE 35: CPT | Performed by: INTERNAL MEDICINE

## 2024-10-17 PROCEDURE — 99232 SBSQ HOSP IP/OBS MODERATE 35: CPT | Performed by: HOSPITALIST

## 2024-10-17 RX ORDER — HYDRALAZINE HYDROCHLORIDE 25 MG/1
25 TABLET, FILM COATED ORAL ONCE
Status: COMPLETED | OUTPATIENT
Start: 2024-10-17 | End: 2024-10-17

## 2024-10-17 RX ORDER — HYDRALAZINE HYDROCHLORIDE 25 MG/1
25 TABLET, FILM COATED ORAL 2 TIMES DAILY
Status: DISCONTINUED | OUTPATIENT
Start: 2024-10-17 | End: 2024-10-17

## 2024-10-17 RX ORDER — POTASSIUM CHLORIDE 1500 MG/1
40 TABLET, EXTENDED RELEASE ORAL EVERY 4 HOURS
Status: COMPLETED | OUTPATIENT
Start: 2024-10-17 | End: 2024-10-17

## 2024-10-17 RX ORDER — HYDRALAZINE HYDROCHLORIDE 50 MG/1
50 TABLET, FILM COATED ORAL 2 TIMES DAILY
Status: DISCONTINUED | OUTPATIENT
Start: 2024-10-17 | End: 2024-10-18

## 2024-10-17 RX ORDER — DILTIAZEM HYDROCHLORIDE 30 MG/1
30 TABLET, FILM COATED ORAL EVERY 6 HOURS SCHEDULED
Status: DISCONTINUED | OUTPATIENT
Start: 2024-10-17 | End: 2024-10-18

## 2024-10-17 NOTE — PLAN OF CARE
Assumed care of pt at 1930.  Pt A&Ox 4. On 2L NC. NSR on tele; no c/o cardiac symptoms. Joe in place. Pt denies chest pain or discomfort; seems to be resting comfortably at this time. Pt up with one assist and a walker.    Plan of care reviewed with pt; all questions answered at this time. Bed in lowest position; call light within reach. High fall risk precautions are in place per unit protocol.     Problem: Diabetes/Glucose Control  Goal: Glucose maintained within prescribed range  Description: INTERVENTIONS:  - Monitor Blood Glucose as ordered  - Assess for signs and symptoms of hyperglycemia and hypoglycemia  - Administer ordered medications to maintain glucose within target range  - Assess barriers to adequate nutritional intake and initiate nutrition consult as needed  - Instruct patient on self management of diabetes  Outcome: Progressing     Problem: Patient/Family Goals  Goal: Patient/Family Long Term Goal  Description: Patient's Long Term Goal: discharge home    Interventions:  - Hospitalist/Cards, labs, trops, chest xray, CT chest/abd/pelvis, prn antiemetics, ECHO, stress test  - See additional Care Plan goals for specific interventions  Outcome: Progressing  Goal: Patient/Family Short Term Goal  Description: Patient's Short Term Goal: resolve n/v, control pain    Interventions:   - prn zofran/reglan, nitroglycerin, aspirin, lasix  - See additional Care Plan goals for specific interventions  Outcome: Progressing

## 2024-10-17 NOTE — PROGRESS NOTES
Progress Note   Vira . Patient Status:  Inpatient    1940 MRN [id number]   Location [facility] 2NE-A Attending Gualberto Braga,    Hosp Day # 2 PCP Laura Marino MD     Subjective:  Denies chest pain, shortness of breath  O2 down to 2L    Objective:  /60 (BP Location: Right arm)   Pulse 94   Temp 98.5 °F (36.9 °C) (Oral)   Resp 16   Ht 5' 6\" (1.676 m)   Wt 140 lb 10.5 oz (63.8 kg)   SpO2 93%   BMI 22.70 kg/m²     Telemetry: SR, 80s-100s    Intake/Output:    Intake/Output Summary (Last 24 hours) at 10/17/2024 0837  Last data filed at 10/17/2024 0420  Gross per 24 hour   Intake --   Output 2400 ml   Net -2400 ml     Last 3 Weights   10/17/24 0530 140 lb 10.5 oz (63.8 kg)   10/15/24 2059 149 lb 9.6 oz (67.9 kg)   10/15/24 1142 150 lb (68 kg)   22 1032 147 lb (66.7 kg)   22 2200 147 lb 1.6 oz (66.7 kg)   22 1700 150 lb (68 kg)     Labs:  Recent Labs   Lab 10/15/24  1151 10/16/24  0623 10/17/24  0656   * 127* 142*   BUN 20 21 18   CREATSERUM 1.55* 1.57* 1.39*   EGFRCR 44* 43* 50*   CA 10.0 9.5 10.0    142 140   K 4.2 4.1 3.5    108 104   CO2 25.0 25.0 28.0     Recent Labs   Lab 10/15/24  1151 10/16/24  0623 10/17/24  0656   RBC 3.63* 3.13* 3.59*   HGB 10.7* 9.0* 10.8*   HCT 29.4* 25.5* 28.9*   MCV 81.0 81.5 80.5   MCH 29.5 28.8 30.1   MCHC 36.4 35.3 37.4*   RDW 15.4 15.6 15.4   NEPRELIM 5.79 6.60 7.34   WBC 6.8 7.8 8.5   .0 216.0 206.0     Recent Labs   Lab 10/15/24  1151 10/15/24  1344   TROPHS 9 8     Lab Results   Component Value Date/Time    HDL 36 (L) 10/16/2024 06:23 AM    LDL 49 10/16/2024 06:23 AM    TRIG 51 10/16/2024 06:23 AM     Lab Results   Component Value Date    DDIMER 1.75 (H) 10/15/2024     Lab Results   Component Value Date    TSH 3.480 2022     Review of Systems:   Constitutional: No fevers, chills, fatigue or night sweats.  ENT: No mouth pain, neck pain, running nose, headaches or swollen glands.  Skin: No  rashes, pruritus or skin changes,  Respiratory: Denies cough, wheezing or shortness of breath.  CV: Denies chest pain, palpitations, orthopnea, PND or dizziness.  Musculoskeletal: No joint pain, stiffness or swelling.  GI: No nausea, vomiting or diarrhea. No blood in stools.  Neurologic: No seizures, tremors, weakness or numbness.     Physical Exam:  General: Alert, cooperative, no distress, appears stated age.  Neck: Supple, symmetrical, trachea midline, no adenopathy, thyroid: no enlargment/tenderness/nodules, no carotid bruit and no JVD.  Lungs: clear to auscultation bilaterally  Chest wall: No tenderness or deformity.  Heart: Regular rate and rhythm, S1, S2 normal, no murmur, click, rub or gallop.  Abdomen: Soft, non-tender. Bowel sounds normal. No masses,  No organomegaly.  Extremities: Extremities normal, atraumatic, no cyanosis, trace BLE edema.  Pulses: 2+ and symmetric all extremities.  Neurologic: Grossly intact.    Diagnostics:  US VENOUS DOPPLER LEG LEFT - DIAG IMG (CPT=93971)    Result Date: 10/16/2024  CONCLUSION:  Negative exam, no evidence of left lower extremity DVT.   LOCATION:  [facility]    Dictated by ([facility]): Fabrizio Camacho DO on 10/16/2024 at 3:17 PM     Finalized by (CST): Fabrizio Camacho DO on 10/16/2024 at 3:18 PM       CT ABDOMEN+PELVIS KIDNEYSTONE 2D RNDR(NO IV,NO ORAL)(CPT=74176)    Result Date: 10/16/2024  CONCLUSION:   1. Small bilateral pericardial effusions with atelectasis and consolidations at the lung bases may be due to fluid overload.  Aspiration is of consideration.  2. Small pericardial effusion is noted.  3. Nonobstructing calculi in both kidneys.  No hydronephrosis.  4. Enlarged prostate gland is noted.  5. Joe catheter within the decompressed bladder is noted.  Bladder wall thickening is again suggested.     LOCATION:  [place]   Dictated by ([facility]): Vivek Alston MD on 10/16/2024 at 3:04 PM     Finalized by (CST): Vivek Alston MD on 10/16/2024 at 3:08 PM      Echo:  10/15/24  Conclusions:     1. Left ventricle: The cavity size was normal. Wall thickness was mildly      increased. Systolic function was hyperdynamic. The estimated ejection      fraction was 75-80%, by visual assessment. No diagnostic evidence for      regional wall motion abnormalities. Left ventricular diastolic function      parameters were normal for the patient's age.   2. Left atrium: The left atrial volume was moderately increased.   3. Mitral valve: The annulus was moderately calcified. Transvalvular      velocity was increased, due to stenosis. The findings were consistent      with moderate to severe stenosis. The mean diastolic gradient was 12mm Hg      at a HR of 81 bpm.   4. Pulmonary arteries: Systolic pressure was mildly increased, in the range      of 35mm Hg to 40mm Hg.   5. Pericardium, extracardiac: A moderate pericardial effusion was      identified. There was no evidence of hemodynamic compromise.   Impressions:  No previous study was available for comparison.   *   Medications:   potassium chloride  40 mEq Oral Q4H    carvedilol  25 mg Oral BID with meals    fluticasone-salmeterol  1 puff Inhalation 2 times daily    And    umeclidinium bromide  1 puff Inhalation Daily    ipratropium-albuterol  3 mL Nebulization Q6H WA    amLODIPine  10 mg Oral Nightly    aspirin  81 mg Oral Daily    cilostazol  100 mg Oral BID    ferrous sulfate   Oral Daily with breakfast    hydrALAZINE  25 mg Oral BID    losartan  50 mg Oral BID    rosuvastatin  10 mg Oral Nightly    tamsulosin  0.4 mg Oral Nightly    heparin  5,000 Units Subcutaneous Q8H ELVIE    insulin aspart  2-10 Units Subcutaneous TID AC and HS     Assessment:    84 year old male with PMH of HTN, HLD, PAD s/p amputation of left big toe, CKD3, DMII who presented with chest pain.    Acute Chest pain   Troponin negative x2 on admission   -CXR notable for cardiomegaly, interstitial pulmonary edema  -proBNP 1,163  -CTA chest negative for PE, revealed  moderate pericardial effusion, Echo confirmed moderate effusion without evidence of tamponade, stable BP  -IV lasix given x 1 dose yesterday with 2.4L out, no need for further diuresis  -D-dimer elevated 1.75 2/2 effusion  -hold off on lexiscan with current findings of effusion, may consider OP given risk factors    Moderate pericardial effusion   Admitted with chest pain   -CTA/echo revealed moderate pericardial effusion  -Echo revealed LVEF 75-80%, no RWMA, PASP 35-40mmHg, moderate to severe mitral stenosis  -D-dimer 1.75  -unsure of cause, possibly viral?  -Hgb dropped 1g, now back to 10.8  -WBC unremarkable  -remains on supplemental O2 4L  -ESR 28, CRP <0.40  -repeat limited echo tomorrow to monitor size of effusion    Moderate to severe Mitral Stenosis  Mean gradient 12mmHg  -will monitor  -goal to keep HR controlled, will add diltiazem    HTN  -150s  -amlodipine, losartan, coreg  -BP still elevated, will increase hydralazine  -adding dilitiazem for HR control, will stop amlodipine    HLD  Rosuvastatin, LDL 49    DMII-A1c 6.1, metformin PTA    PAD  S/p left big toe amputation  -cilostazol, asa, statin    Leg swelling  Duplex negative for DVT    CKD3  Cr stable 1.39    Urinary Retention   Joe in place    Plan:  -Start dilitiazem 30mg QID, stop amlodipine-if BP tolerates, will discharge on long acting CR  -Continue losartan, coreg  -Increase hydralazine to 50mg BID-give one time dose of 25mg now  -Continue asa, statin, cilastazol  -monitor daily CBC  -monitor BP with medication changes  -limited echo tomorrow to evaluate effusion size    Plan of care discussed with patient, RN, Dr. Fishman.      Lisa Dudley, APRN  10/17/24  8:42 AM  [phone] [place]  [phone] [place]      Physician addendum:  I have independently seen and examined the patient, and agree with plan as outlined above.    83 yo M presenting with chest pain, found to have moderate pericardial effusion (not significant  hemodynamically), moderate to severe MS with MG >10, and acute hypoxic respiratory failure. Appreciate pulm assistance. He does not appear to be fluid overloaded. Would try to slow his HR to improve diastolic filling time and further decongest in hopes to improve his hypoxia.     L2    Sushant Fishman MD  [facility] Cardiology   [facility]

## 2024-10-17 NOTE — PHYSICAL THERAPY NOTE
PHYSICAL THERAPY EVALUATION - INPATIENT     Room Number: 2617/2617-A  Evaluation Date: 10/17/2024  Type of Evaluation: Initial  Physician Order: PT Eval and Treat    Presenting Problem: chest pain, pericardial effusion, acute hypoxic respiratory failure  Co-Morbidities : ataxia, DM, HTN  Reason for Therapy: Mobility Dysfunction and Discharge Planning    PHYSICAL THERAPY ASSESSMENT   Patient is a 84 year old male admitted 10/15/2024 for chest pain, pericardial effusion, acute hypoxic respiratory failure.  Prior to admission, patient's baseline is independent with no AD, per pt.  Patient is currently functioning near baseline with bed mobility, transfers, gait, maintaining seated position, and standing prolonged periods.  Patient is requiring minimal assist as a result of the following impairments: decreased functional strength, decreased endurance/aerobic capacity, impaired   balance, decreased muscular endurance, and increased O2 needs from baseline.  Physical Therapy will continue to follow for duration of hospitalization.    Patient will benefit from continued skilled PT Services at discharge to promote prior level of function and safety with additional support and return home with home health PT.    PLAN DURING HOSPITALIZATION  Nursing Mobility Recommendation : 1 Assist  PT Device Recommendation: Rolling walker;Gait belt  PT Treatment Plan: Bed mobility;Endurance;Energy conservation;Patient education;Family education;Gait training;Strengthening;Stair training;Transfer training;Balance training  Rehab Potential : Good  Frequency (Obs): 3-5x/week     CURRENT GOALS    Goal #1 Patient is able to demonstrate supine - sit EOB @ level: modified independent     Goal #2 Patient is able to demonstrate transfers Sit to/from Stand at assistance level: supervision     Goal #3 Patient is able to ambulate 150 feet with assist device:  LRAD  at assistance level: supervision     Goal #4    Goal #5    Goal #6    Goal Comments:  Goals established on 10/17/2024      PHYSICAL THERAPY MEDICAL/SOCIAL HISTORY  History related to current admission: Patient is a 84 year old male admitted on 10/15/2024 from home for chest pain, pericardial effusion, acute hypoxic respiratory failure.    HOME SITUATION  Type of Home: House  Home Layout: One level                     Lives With: Alone    Drives: No   Patient Regularly Uses: None      Prior Level of [place]: Pt reports he is typically independent with ADLs, ambulates with no AD, and does not drive. Pt reports his dtrs can assist him at DC. Pt appears mildly confused or forgetful, may be a poor historian.     SUBJECTIVE  \"I've been in this bed too long\"     OBJECTIVE  Precautions: Bed/chair alarm (SPO2 88-92%)  Fall Risk: High fall risk    WEIGHT BEARING RESTRICTION     PAIN ASSESSMENT  Ratin          COGNITION  Orientation Level:  oriented to place, oriented to person, disoriented to time, and knew the year but not the month (stated )   Memory:  pt appears forgetful   Following Commands:  follows one step commands with repetition  Awareness of Errors:  assistance required to identify errors made, assistance required to correct errors made, and decreased awareness of errors   Problem Solving:  assistance required to identify errors made, assistance required to generate solutions, and assistance required to implement solutions    RANGE OF MOTION AND STRENGTH ASSESSMENT  Upper extremity ROM and strength are within functional limits     Lower extremity ROM is within functional limits     Lower extremity strength is within functional limits, except generalized weakness and deconditioning    BALANCE  Static Sitting: Good  Dynamic Sitting: Fair +  Static Standing: Poor +  Dynamic Standing: Poor +    ADDITIONAL TESTS                                    ACTIVITY TOLERANCE   Pt denies dizziness or shortness of breath                      O2 WALK  Oxygen Therapy  At rest oxygen flow (liters per  minute): 2  Ambulation oxygen flow (liters per minute): 2    NEUROLOGICAL FINDINGS                        AM-PAC '6-Clicks' INPATIENT SHORT FORM - BASIC MOBILITY  How much difficulty does the patient currently have...  Patient Difficulty: Turning over in bed (including adjusting bedclothes, sheets and blankets)?: None   Patient Difficulty: Sitting down on and standing up from a chair with arms (e.g., wheelchair, bedside commode, etc.): A Little   Patient Difficulty: Moving from lying on back to sitting on the side of the bed?: None   How much help from another person does the patient currently need...   Help from Another: Moving to and from a bed to a chair (including a wheelchair)?: A Little   Help from Another: Need to walk in hospital room?: A Little   Help from Another: Climbing 3-5 steps with a railing?: A Little     AM-PAC Score:  Raw Score: 20   Approx Degree of Impairment: 35.83%   Standardized Score (AM-PAC Scale): 47.67   CMS Modifier (G-Code): CJ    FUNCTIONAL ABILITY STATUS  Gait Assessment   Functional Mobility/Gait Assessment  Gait Assistance: Minimum assistance  Distance (ft): 2  Assistive Device: Cane  Pattern: Shuffle;Ataxic    Skilled Therapy Provided: Per RN okay to work with pt. Pt received in supine and was agreeable to PT session.     Bed Mobility:  Rolling: NT  Supine to sit: supervision, HOB elevated and use of bed rail    Sit to supine: NT     Transfer Mobility:  Sit to stand: CGA    Stand to sit: CGA   Gait = pt ambulated from bed to chair with cane and min A for balance, pt highly unsteady    Pt declined further ambulation because he wanted to eat his breakfast.     Therapist's Comments: Pt educated on role of therapy, goals for session, safety, fall prevention, and activity recommendations.     Exercise/Education Provided:  Bed mobility  Energy conservation  Functional activity tolerated  Gait training  Posture  Strengthening  Transfer training    Patient End of Session: Up in chair;Needs  met;Call light within reach;RN aware of session/findings;All patient questions and concerns addressed;Hospital anti-slip socks;Alarm set      Patient Evaluation Complexity Level:  History Moderate - 1 or 2 personal factors and/or co-morbidities   Examination of body systems Low -  addressing 1-2 elements   Clinical Presentation Low- Stable   Clinical Decision Making Low Complexity       PT Session Time: 23 minutes  Therapeutic Activity: 8 minutes

## 2024-10-17 NOTE — CM/SW NOTE
10/17/24 1100   CM/SW Referral Data   Referral Source Social Work (self-referral)   Reason for Referral Discharge planning   Informant EMR;Clinical Staff Member;Patient   Medical Hx   Does patient have an established PCP? Yes   Patient Info   Patient's Current Mental Status at Time of Assessment Alert;Oriented   Patient's Home Environment House   Patient lives with Alone   Patient Status Prior to Admission   Independent with ADLs and Mobility Yes   Discharge Needs   Anticipated D/C needs Home health care   Choice of Post-Acute Provider   Patient declines recommended services Yes     HOME SITUATION per PT eval  Type of Home: House  Home Layout: One level                     Lives With: Alone    Drives: No   Patient Regularly Uses: None       Prior Level of [place] per PT eval: Pt reports he is typically independent with ADLs, ambulates with no AD, and does not drive. Pt reports his dtrs can assist him at DC. Pt appears mildly confused or forgetful, may be a poor historian.    Patient is an 83 y/o male admitted due to acute chest pain. Anticipated therapy need for pt at [place] is HH. Per chart review, pt is from [place] and is here visiting his daughters.    LUNA met with pt at bedside to discuss DC plan. Pt reports he lives alone in a house in [place]. Pt stated he is here visiting his daughters but plans to return back to [place] as soon as possible. Pt stated he owns a rolling walker and rollator, but stated he does not really use them. Pt reports typically being independent with ADLs and mobility.     SW discussed HH services and whether pt will be staying here in [place] for a while after DC. Pt stated he does not know exactly when he will be returning to [place], but wants to leave as soon as he can. Pt stated he does not need HH services as he feels he is able to ambulate alright and can manage getting around at home. Pt agreeable with LUNA reaching out to his daughter, Lianet, to discuss DC plan. Pt thanked LUNA and  denied having any further questions/concerns at this time.    SW called pt's daughter, Octavio ([phone]), but was unable to get a hold of her. SW left  with callback information.     to remain available for support and/or discharge planning.    FEDE Manzano  Discharge Planner  [phone]

## 2024-10-17 NOTE — OCCUPATIONAL THERAPY NOTE
OCCUPATIONAL THERAPY EVALUATION - INPATIENT     Room Number: 2617/2617-A  Evaluation Date: 10/17/2024  Type of Evaluation: Initial  Presenting Problem: chest pain    Physician Order: IP Consult to Occupational Therapy  Reason for Therapy: ADL/IADL Dysfunction and Discharge Planning    OCCUPATIONAL THERAPY ASSESSMENT   Patient is currently functioning near baseline with toileting, upper body dressing, lower body dressing, grooming, bed mobility, transfers, static sitting balance, dynamic sitting balance, static standing balance, dynamic standing balance, maintaining seated position, and functional standing tolerance. Prior to admission, patient's baseline is Mod I for all ADLs and IADLs.  Patient is requiring minimum assistance as a result of the following impairments: decreased functional strength, decreased functional reach, decreased endurance, impaired coordination, impaired motor planning, and decreased muscular endurance. Occupational Therapy will continue to follow for duration of hospitalization.    Patient will benefit from continued skilled OT Services at discharge to promote prior level of function and safety with additional support and return home with home health OT    History Related to Current Admission: Patient is a 84 year old male admitted on 10/15/2024 with Presenting Problem: chest pain. Co-Morbidities : ataxia, DM, HTN    WEIGHT BEARING RESTRICTION       Recommendations for nursing staff:   Transfers: Min A  Toileting location: commode    EVALUATION SESSION:  Patient Start of Session: supine in bed for session    FUNCTIONAL TRANSFER ASSESSMENT  Sit to Stand: Edge of Bed  Edge of Bed: Minimal Assist    BED MOBILITY  Rolling: Contact Guard Assist  Supine to Sit : Contact Guard Assist  Sit to Supine (OT): Minimal Assist    BALANCE ASSESSMENT  Static Sitting: Contact Guard Assist  Static Standing: Minimal Assist (Min A to stand and Min A to take steps to chair)    FUNCTIONAL ADL ASSESSMENT  LB  Dressing Seated: Supervision (for socks at EOB)    ACTIVITY TOLERANCE: vitals stable                         O2 SATURATIONS       COGNITION  Overall Cognitive Status:  WFL - within functional limits    Upper Extremity   ROM: within functional limits   Strength: within functional limits   Coordination  Gross motor: WNL  Fine motor: WNL  Sensation: Light touch:  intact    EDUCATION PROVIDED  Patient Education : Role of Occupational Therapy; Plan of Care  Patient's Response to Education: Verbalized Understanding; Returned Demonstration    Equipment used: cane  Demonstrates functional use, Would benefit from additional trial      Therapist comments: Pt reported fatigue at home, pt educated on work simplification and energy conservation for at home to assist with independence with fatigue at home.      Patient End of Session: Up in chair;Needs met;Call light within reach;All patient questions and concerns addressed;Hospital anti-slip socks;Alarm set    OCCUPATIONAL PROFILE    HOME SITUATION  Type of Home: House  Home Layout: One level  Lives With: Alone    Toilet and Equipment: Standard height toilet  Shower/Tub and Equipment: Walk-in shower  Other Equipment: None    Occupation/Status: retired  Hand Dominance: Right  Drives: No  Patient Regularly Uses: None    Prior Level of Function: Pt reports he is typically independent with ADLs, ambulates with no AD, and does not drive. Pt reports his dtrs can assist him at DC. Pt appears mildly confused or forgetful, may be a poor historian.     SUBJECTIVE   Pt stated, \"I have been in bed for a few hot days.\"    PAIN ASSESSMENT  Ratin  Location: no pain at this time       OBJECTIVE  Precautions: Bed/chair alarm (SPO2 88-92%)  Fall Risk: High fall risk    ASSESSMENTS    AM-PAC ‘6-Clicks’ Inpatient Daily Activity Short Form  -   Putting on and taking off regular lower body clothing?: A Little  -   Bathing (including washing, rinsing, drying)?: A Little  -   Toileting, which  includes using toilet, bedpan or urinal? : A Little  -   Putting on and taking off regular upper body clothing?: A Little  -   Taking care of personal grooming such as brushing teeth?: A Little  -   Eating meals?: A Little    AM-PAC Score:  Score: 18  Approx Degree of Impairment: 46.65%  Standardized Score (AM-PAC Scale): 38.66    ADDITIONAL TESTS     NEUROLOGICAL FINDINGS      COGNITION ASSESSMENTS     PLAN     OT Treatment Plan: Balance activities;Energy conservation/work simplification techniques;IADL training;ADL training;Continued evaluation;Compensatory technique education;Equipment eval/education;Patient/Family training;Patient/Family education;Endurance training;UE strengthening/ROM;Functional transfer training  Rehab Potential : Good  Frequency: 3-5x/week  Number of Visits to Meet Established Goals: 5    ADL Goals   Patient will perform upper body dressing:  with supervision  Patient will perform lower body dressing:  with supervision  Patient will perform toileting: with supervision    Functional Transfer Goals  Patient will transfer from supine to sit:  with supervision  Patient will transfer from sit to stand:  with supervision  Patient will transfer to toilet:  with supervision    UE Exercise Program Goal  Patient will be supervision with bilateral AROM HEP (home exercise program).    Additional Goals:  Pt will verbalize at least 3 energy conservation techniques  Pt will stand at sink for 5 minutes to complete grooming routine    Patient Evaluation Complexity Level:   Occupational Profile/Medical History MODERATE - Expanded review of history including review of medical or therapy record   Specific performance deficits impacting engagement in ADL/IADL MODERATE  3 - 5 performance deficits   Client Assessment/Performance Deficits MODERATE - Comorbidities and min to mod modifications of tasks    Clinical Decision Making MODERATE - Analysis of occupational profile, detailed assessments, several treatment  options    Overall Complexity MODERATE     OT Session Time: 15 minutes  Self-Care Home Management: 10 minutes  Therapeutic Activity: 0 minutes  Neuromuscular Re-education: 0 minutes  Therapeutic Exercise: 0 minutes  Cognitive Skills: 0 minutes  Sensory Integrative: 0 minutes  Orthotic Management and Trainin minutes  Can add/delete any of these

## 2024-10-17 NOTE — PROGRESS NOTES
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    Overnight: started scheduled inhalers, now off oxygen    Objective:  /80 (BP Location: Right arm)   Pulse 93   Temp 97.6 °F (36.4 °C) (Oral)   Resp 18   Ht 5' 6\" (1.676 m)   Wt 140 lb 10.5 oz (63.8 kg)   SpO2 92%   BMI 22.70 kg/m²     Temp (24hrs), Av.3 °F (36.8 °C), Min:97.6 °F (36.4 °C), Max:98.6 °F (37 °C)      Intake/Output:    Intake/Output Summary (Last 24 hours) at 10/17/2024 1307  Last data filed at 10/17/2024 0930  Gross per 24 hour   Intake 180 ml   Output 2400 ml   Net -2220 ml       Physical Exam:   General: alert, cooperative, oriented.  No respiratory distress.   Head: Normocephalic, without obvious abnormality, atraumatic.   Throat: Lips, mucosa, and tongue normal.  No thrush noted.   Neck: trachea midline, no adenopathy, no thyromegaly. No JVD.   Lungs: clear to auscultation bilaterally   Chest wall: No tenderness or deformity.   Heart: regular rate and rhythm, S1, S2 normal, no murmur, click, rub or gallop   Abdomen: soft, non-distended, no masses, no guarding, no     Rebound.   Extremity: No edema or cyanosis   Skin: No rashes or lesions.   Neurological: Alert, interactive, no focal deficits    Lab Data Review:  Recent Labs     10/15/24  1151 10/16/24  0623 10/17/24  0656   WBC 6.8 7.8 8.5   HGB 10.7* 9.0* 10.8*   .0 216.0 206.0     Recent Labs     10/15/24  1151 10/16/24  0623 10/17/24  0656    142 140   K 4.2 4.1 3.5    108 104   CO2 25.0 25.0 28.0   BUN 20 21 18   CREATSERUM 1.55* 1.57* 1.39*     Recent Labs   Lab 10/15/24  1223   PTP 15.4*   INR 1.21*   PTT 29.0       Cultures:   No results found for this visit on 10/15/24.    Radiology:  US VENOUS DOPPLER LEG  LEFT - DIAG IMG (CPT=93971)  Narrative: PROCEDURE:  US VENOUS DOPPLER LEG LEFT - DIAG IMG (CPT=93971)     COMPARISON:  [facility], US VENOUS DOPPLER LEG LEFT - DIAG IMG (CPT=93971), 9/30/2024, 7:18 PM.     INDICATIONS:  Patient presents with left lower extremity swelling and elevated D-dimer levels.     TECHNIQUE:  Real time, grey scale, and duplex ultrasound was used to evaluate the lower extremity venous system. B-mode two-dimensional images of the vascular structures, Doppler spectral analysis, and color flow.  Doppler imaging were performed.  The   following veins were imaged:  Common, deep, and superficial femoral, popliteal, sapheno-femoral junction, posterior tibial veins, and the contralateral common femoral vein.     PATIENT STATED HISTORY: (As transcribed by Technologist)           FINDINGS:    EXTREMITY EXAMINED:  Left lower extremity  SAPHENOFEMORAL JUNCTION:  No reflux.  THROMBI:  None visible.  COMPRESSION:  Normal compressibility, phasicity, and augmentation.  OTHER:  Negative.                   Impression: CONCLUSION:  Negative exam, no evidence of left lower extremity DVT.        LOCATION:  [facility]           Dictated by (CST): Fabrizio Camacho DO on 10/16/2024 at 3:17 PM       Finalized by (CST): Fabrizio Camacho DO on 10/16/2024 at 3:18 PM     CT ABDOMEN+PELVIS KIDNEYSTONE 2D RNDR(NO IV,NO ORAL)(CPT=74176)  Narrative: PROCEDURE:  CT ABDOMEN+PELVIS KIDNEYSTONE 2D RNDR(NO IV,NO ORAL)(CPT=74176)     COMPARISON:  [facility] , CT, CTA CHEST + CT ABD (W) + CT PEL (W) SH(CPT=71275/00940), 10/15/2024, 4:49 PM.     INDICATIONS:  recurrent UTIs     TECHNIQUE:  Unenhanced multislice CT scanning from above the kidneys to below the urinary bladder.  2D rendering are generated on the CT scanner workstation to localize potential stones in the cranio-caudal plane.  Dose reduction techniques were used.   Dose information is transmitted to the ACR (American College of Radiology) NRDR (National Radiology Data Registry) which  includes the Dose Index Registry.     PATIENT STATED HISTORY: (As transcribed by Technologist)  Recurrent UTIs.         FINDINGS:    KIDNEYS:  Multiple nonobstructing calcifications in bilateral renal collecting systems.  No hydronephrosis.  BLADDER:  Joe catheter in the bladder is noted.  ADRENALS:  Adrenal gland thickening is noted.  LIVER:  No enlargement, atrophy, abnormal density, or significant focal lesion.    BILIARY:  Contrast in the gallbladder is is likely due to vicarious excretion of contrast.  PANCREAS:  No lesion, fluid collection, ductal dilatation, or atrophy.    SPLEEN:  No enlargement or focal lesion.    AORTA/VASCULAR:  Vascular calcifications are noted.  RETROPERITONEUM:  No mass or adenopathy.    BOWEL/MESENTERY:  Diverticulosis of the colon without evidence of acute diverticulitis.  The appendix is normal.  ABDOMINAL WALL:  No mass or hernia.    BONES:  No bony lesion or fracture.  PELVIC ORGANS:  Prostate gland measures 6.3 cm.   LUNG BASES:  Small bilateral pleural effusions along with atelectasis and consolidation.  Small pericardial effusion.  OTHER:  Negative.                     Impression: CONCLUSION:       1. Small bilateral pericardial effusions with atelectasis and consolidations at the lung bases may be due to fluid overload.  Aspiration is of consideration.     2. Small pericardial effusion is noted.     3. Nonobstructing calculi in both kidneys.  No hydronephrosis.     4. Enlarged prostate gland is noted.     5. Joe catheter within the decompressed bladder is noted.  Bladder wall thickening is again suggested.             LOCATION:  [facility]        Dictated by (CST): Vivek Altson MD on 10/16/2024 at 3:04 PM       Finalized by (CST): Vivek Alston MD on 10/16/2024 at 3:08 PM         Medications reviewed     Assessment and Plan:   Patient Active Problem List   Diagnosis    Community acquired pneumonia    Community acquired pneumonia, unspecified laterality    LA (acute  kidney injury) (HCC)    Urinary tract infection without hematuria    Rhinovirus    Memory change    Anemia    Hyperglycemia    Acute chest pain    Hypoxia    Acute on chronic congestive heart failure, unspecified heart failure type (HCC)    Pericardial effusion (HCC)       Assessment:  Acute hypoxic respiratory failure, CT scan was reviewed, no evidence of pneumonia but does have moderate emphysema, no wheezing on exam to suggest COPD exacerbation  Chest pain with pericardial effusion on CT scan, cardiology is following  CKD stage III  Hypertension  Hyperlipidemia  Peripheral arterial disease    Plan:  Monitor on room air  Ambulate in hallways and assess oxygen needs  If no requirements would be ok for dc from my perspective  Continue inhalers as is with bronchodilators   Fu bedside spirometry  Would plan to send home on [facility] or equivalent         Jennifer Hutson MD  [facility] Pulmonary Medicine  Office: [phone]

## 2024-10-17 NOTE — PLAN OF CARE
Assumed pt care at 0730. A&O x 4. On 2L O2 via NC. Denies pain. Vital signs stable, NSR on tele. Joe in place. Up with x1 assist and walker.     Plan of care: wean O2, echo tomorrow, scheduled nebulizer tx.    Plan of care updated with patient. Questions answered, pt verbalized understanding. Call light is within reach. All needs met at this time.    Problem: Diabetes/Glucose Control  Goal: Glucose maintained within prescribed range  Description: INTERVENTIONS:  - Monitor Blood Glucose as ordered  - Assess for signs and symptoms of hyperglycemia and hypoglycemia  - Administer ordered medications to maintain glucose within target range  - Assess barriers to adequate nutritional intake and initiate nutrition consult as needed  - Instruct patient on self management of diabetes  Outcome: Progressing     Problem: Patient/Family Goals  Goal: Patient/Family Long Term Goal  Description: Patient's Long Term Goal: discharge home    Interventions:  - Hospitalist/Cards, labs, trops, chest xray, CT chest/abd/pelvis, prn antiemetics, ECHO, stress test  - See additional Care Plan goals for specific interventions  Outcome: Progressing  Goal: Patient/Family Short Term Goal  Description: Patient's Short Term Goal: resolve n/v, control pain    Interventions:   - prn zofran/reglan, nitroglycerin, aspirin, lasix  - See additional Care Plan goals for specific interventions  Outcome: Progressing

## 2024-10-17 NOTE — PROGRESS NOTES
[facility]   part of [facility]     Hospitalist Progress Note     Lawyer Vira Mccarty Patient Status:  Inpatient    1940 MRN [id number]   Location [facility] 2NE-A Attending Gualberto Braga,    Hosp Day # 2 PCP Laura Marino MD     Chief Complaint: Chest pain    Subjective:     Patient seen and examined. Denies CP/palpitations/SOB. Feeling well.     Objective:    Review of Systems:   A comprehensive review of systems was completed; pertinent positive and negatives stated in subjective.    Vital signs:  Temp:  [97.6 °F (36.4 °C)-98.6 °F (37 °C)] 97.6 °F (36.4 °C)  Pulse:  [] 93  Resp:  [16-20] 18  BP: (122-155)/(60-80) 155/80  SpO2:  [87 %-98 %] 92 %    Physical Exam:    General: No acute distress  Respiratory: Diminished bilaterally.   Cardiovascular: S1, S2, regular rate and rhythm  Abdomen: Soft, Non-tender, non-distended, positive bowel sounds  Neuro: No new focal deficits.   Extremities: No edema    Diagnostic Data:    Labs:  Recent Labs   Lab 10/15/24  1151 10/15/24  1223 10/16/24  0623 10/17/24  0656   WBC 6.8  --  7.8 8.5   HGB 10.7*  --  9.0* 10.8*   MCV 81.0  --  81.5 80.5   .0  --  216.0 206.0   INR  --  1.21*  --   --        Recent Labs   Lab 10/15/24  1151 10/16/24  0623 10/17/24  0656   * 127* 142*   BUN 20 21 18   CREATSERUM 1.55* 1.57* 1.39*   CA 10.0 9.5 10.0   ALB 4.9*  --   --     142 140   K 4.2 4.1 3.5    108 104   CO2 25.0 25.0 28.0   ALKPHO 84  --   --    AST 21  --   --    ALT 13  --   --    BILT 0.8  --   --    TP 8.6*  --   --        Estimated Creatinine Clearance: 35.1 mL/min (A) (based on SCr of 1.39 mg/dL (H)).    Recent Labs   Lab 10/15/24  1151 10/15/24  1344   TROPHS 9 8       Recent Labs   Lab 10/15/24  1223   PTP 15.4*   INR 1.21*                  Microbiology    No results found for this visit on 10/15/24.      Imaging: Reviewed in Epic.    Medications:    hydrALAZINE  50 mg Oral BID    dilTIAZem  30 mg Oral 4 times per day     carvedilol  25 mg Oral BID with meals    fluticasone-salmeterol  1 puff Inhalation 2 times daily    And    umeclidinium bromide  1 puff Inhalation Daily    ipratropium-albuterol  3 mL Nebulization Q6H WA    aspirin  81 mg Oral Daily    cilostazol  100 mg Oral BID    ferrous sulfate   Oral Daily with breakfast    losartan  50 mg Oral BID    rosuvastatin  10 mg Oral Nightly    tamsulosin  0.4 mg Oral Nightly    heparin  5,000 Units Subcutaneous Q8H [facility], 1928[facility]1983    insulin aspart  2-10 Units Subcutaneous TID AC and HS       Assessment & Plan:      #Chest Pain  -Serial troponin negative  -Cardiology following  -Aspirin given  -telemetry      #Pericardial effusion on CT  -ECHO reviewed - plan for repeat tomorrow   -ESR slightly elevated, CRP WNL  -Cardiology following     #Elevated ddimer - LLE swelling  -CTA negative for PE  -LLE doppler negative     #Hypoxia  -? Secondary to emphysema - patient quit smoking 40 years ago  -CTA chest without PE/effusion/PNA  -Pulmonary following - nebs/inhalers started  -Wean oxygen as able       #CKD III  -monitor      #HTN  -Amlodipine, Carvedilol, Hydralazine, Losartan     #DL  -Statin      #DMI  -ISS     #PAD  -Cilostazol     #BPH  -flomax      Gualberto Braga, DO    Supplementary Documentation:     Quality:  DVT Mechanical Prophylaxis:     Early ambuation  DVT Pharmacologic Prophylaxis   Medication    heparin (Porcine) 5000 UNIT/ML injection 5,000 Units         DVT Pharmacologic prophylaxis: Aspirin 81 mg      Code Status: Not on file  Joe: Joe catheter in place  Joe Duration (in days): 2  Central line:    ARTHUR: 10/18/2024    Discharge is dependent on: clinical progress  At this point Mr. Constantino is expected to be discharge to: home    The 21st Century Cures Act makes medical notes like these available to patients in the interest of transparency. Please be advised this is a medical document. Medical documents are intended to carry relevant information, facts as evident, and the clinical  opinion of the practitioner. The medical note is intended as peer to peer communication and may appear blunt or direct. It is written in medical language and may contain abbreviations or verbiage that are unfamiliar.

## 2024-10-17 NOTE — PAYOR COMM NOTE
--------------  ADMISSION REVIEW     Payor: [employer]  Subscriber #:  [id number]  Authorization Number: [id number]    Admit date: 10/15/24  Admit time:  8:48 PM     Patient Seen in: [facility] Emergency Department    History   Stated Complaint: chest pain started 2 hour ago    Patient 84-year-old male who states that about 4 hours ago around 8 AM he developed chest pain.  Patient states that his left lower lateral chest region.  Patient states it is dull ache up to 10 out of 10.  Patient states he has vomited multiple times today with it.  Patient slightly short of breath, no diarrhea, no abdominal pain.  No hematuria or dysuria.  Patient has had swollen left leg for about a week.  Patient denies cough, no fevers or chills.  Remainder of review of systems negative.    Physical Exam     ED Triage Vitals [10/15/24 1142]   /65   Pulse 70   Resp 18   Temp 96.8 °F (36 °C)   Temp src Temporal   SpO2 92 %   O2 Device None (Room air)     Current Vitals:   Vital Signs  BP: (!) 156/145  Pulse: 88  Resp: 15  Temp: 96.8 °F (36 °C)  Temp src: Temporal  MAP (mmHg): (!) 150    Oxygen Therapy  SpO2: 93 %  O2 Device: Nasal cannula  O2 Flow Rate (L/min): 4 L/min    Physical Exam   GENERAL: Patient resting  on the cart in no acute distress.  HEENT: Extraocular muscles intact, pupils equal round reactive to light, neck supple, no meningismus.  LUNGS: Lungs clear to auscultation bilaterally.  CARDIOVASCULAR: + S1-S2, regular rate and rhythm, no murmurs.  BACK: No CVA tenderness, no midline bony tenderness.  ABDOMEN: + Bowel sounds, soft, nontender, nondistended.  No rebound, no guarding, no hepatosplenomegaly.  EXTREMITIES: Full range of motion, no tenderness, good capillary refill.  Edema 1-2+ left more than right  SKIN: No rash, good turgor.  NEURO: Patient answers questions appropriately.  No focal deficits appreciated.  Conversant    Labs Reviewed   COMP METABOLIC PANEL (14) - Abnormal; Notable for the following  Impression: Visual field defect: H53.40. Plan: Patient subjectively describes peripheral field defect. VF ordered & reviewed. Formal visual field testing does not reflect the clinical concern at this time due to chronic frontalis action. Ptosis effect OD at 40 degrees untapped & 20 degrees taped improvement. Ptosis effect OS at 40 degrees untapped & 10 degrees taped improvement. components:       Result Value    Glucose 153 (*)     Creatinine 1.55 (*)     Calculated Osmolality 298 (*)     eGFR-Cr 44 (*)     Total Protein 8.6 (*)     Albumin 4.9 (*)     Globulin  3.7 (*)     All other components within normal limits   CBC WITH DIFFERENTIAL WITH PLATELET - Abnormal; Notable for the following components:    RBC 3.63 (*)     HGB 10.7 (*)     HCT 29.4 (*)     Lymphocyte Absolute 0.60 (*)     All other components within normal limits   PROTHROMBIN TIME (PT) - Abnormal; Notable for the following components:    PT 15.4 (*)     INR 1.21 (*)     All other components within normal limits   D-DIMER - Abnormal; Notable for the following components:    D-Dimer 1.75 (*)     All other components within normal limits   PRO BETA NATRIURETIC PEPTIDE - Abnormal; Notable for the following components:    Pro-Beta Natriuretic Peptide 1,163 (*)     All other components within normal limits   TROPONIN I HIGH SENSITIVITY - Normal   LIPASE - Normal   PTT, ACTIVATED - Normal   TROPONIN I HIGH SENSITIVITY - Normal   RAINBOW DRAW GOLD     EKG 75 Normal sinus rhythm, ST elevation in lead V3 but not present V2 and V4, slight change compared with previous    EKG2 69 Normal sinus rhythm, no acute changes compared with the first    Chest x-ray  1. There is new cardiomegaly.  Recommend echocardiography to exclude pericardial effusion.   2. Prominent pulmonary vascularity and increased interstitial opacities concerning for CHF and interstitial pulmonary edema.      CTA chest abdomen pelvis   There is no pulmonary embolism to the first subsegmental arterial level. There is prominent circumferential mural thickening of the urinary bladder which may represent cystitis. A neoplastic process cannot be entirely excluded. Clinical correlation and/or correlation with direct visualization is recommended. Moderate pericardial effusion is present. Consider correlation with echocardiogram. Nonobstructing right renal calculi are present        MDM      Patient given aspirin.  Patient given nitro.  I did speak with cardiology.  Patient was seen by cardiology did recommend admission for further evaluation.  Patient was hypoxic down to 89% placed on oxygen.  Patient was given Lasix for elevated BNP.  Patient's D-dimer was elevated.  Patient has a CTA pending.  Patient was discussed with Vlad hospitalist.  Patient be admitted for further evaluation.  I did consider CHF, ACS, PE.  Patient was seen earlier in the day by urology did have urologic scope procedure.  Patient did have CT abdomen pelvis ordered by urology.  Disposition and Plan     Clinical Impression:  1. Acute chest pain    2. Hypoxia    3. 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     Patient developed chest pain this morning.  Was a severe dull ache.  Associated with nausea and emesis.  Currently has resolved.  Also more recent swelling of his left lower extremity although reports that this is somewhat chronic.  No recent illness.  No other complaints except for some exertional shortness of breath which is also recent.     Past Medical History       Past Medical History:    AMS (altered mental status)    Ataxia    Atherosclerosis of coronary artery    Diabetes (HCC)    Essential hypertension    High blood pressure    High cholesterol    Hyperlipidemia    Visual impairment        Physical Exam:    /73   Pulse 93   Temp 96.8 °F (36 °C) (Temporal)   Resp 25   Ht 5' 6\" (1.676 m)   Wt 150 lb (68 kg)   SpO2 94%   BMI 24.21 kg/m²   General: No acute distress, Alert  Respiratory: No rhonchi, no wheezes  Cardiovascular: S1, S2. Regular rate and rhythm  Abdomen: Soft, Non-tender, non-distended, positive bowel sounds  Neuro: No new focal  deficits  Extremities: LLE>RLE swollen       Lab 10/15/24  1151   RBC 3.63*   HGB 10.7*   HCT 29.4*   MCV 81.0   MCH 29.5   MCHC 36.4   RDW 15.4   NEPRELIM 5.79   WBC 6.8   .0      *   BUN 20   CREATSERUM 1.55*   EGFRCR 44*   CA 10.0   ALB 4.9*      K 4.2      CO2 25.0   ALKPHO 84   AST 21   ALT 13   BILT 0.8   TP 8.6*      Lab Results   Component Value Date     INR 1.21 (H) 10/15/2024      Lab 10/15/24  1151 10/15/24  1344   TROPHS 9 8      PBNP 1,163*     Assessment & Plan:  #Chest Pain  -Serial troponin  -Cardiology on consult, planning probably nuclear stress test   -Aspirin given  -telemetry      #Pericardial effusion on CT  -ECHO     #Elevated ddimer - LLE swelling  -CTA negative for PE  -repeat LLE doppler      #CKD III  -monitor      #HTN  -Amlodipine, Carvedilol, Hydralazine, Losartan     #DL  -Statin      #DMI  -ISS     #PAD  -Cilostazol     #BPH  -flomax        CARDS:     Patient is a 84 year old male with history of hypertension, hyperlipidemia, 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  He notes that this morning around 8 AM started to have some dull achy chest pain, which progressed and became severe 10 out of 10, then began to subside.  Other associated symptoms were nausea and vomiting.  He currently feels much better and denies any significant chest pain.  Of note had urology bladder tests performed today for urinary retention, currently has a Joe in place.  On admission troponin negative EKG J-point elevation plus changes of LVH.  Stable from prior EKG.     Of note patient has new left leg swelling which has been present last couple of weeks, primary evaluated with duplex which was negative for DVT.    General: Alert and oriented. No apparent distress. No respiratory or constitutional distress.  HEENT: Normocephalic, anicteric sclera, neck supple  Neck: No JVD, carotids 2+, supple  Cardiac: Regular  rate. No pathologic murmur.  Lungs: Clear with normal effort.  Normal excursions and effort.  Abdomen: Soft, non-tender. BS-present.  Extremities: Without clubbing, cyanosis.  Peripheral pulsespresent.  Neurologic: Alert and oriented, normal affect. Motor ok.  Skin: Warm and dry.     Impression:   1.  Chest pain- patient has multiple risk factors for coronary artery disease, including diabetes, PVD, hypertension, hyperlipidemia.  His chest pain has now resolved his initial troponin was negative EKG without STEMI.  Will obtain repeat troponin to assess for change, however would expected to be elevated by this point since his pain started at 8 AM.  If next troponin is negative we will plan for nuclear stress test to further evaluate patient.  Will also check echo  -Ensure patient on ASA 81 mg, high intensity statin  2.  Hypertension-continue Coreg, losartan, hydralazine, amlodipine, hydrochlorothiazide  3.  Hyperlipidemia-continue Crestor  4.  PVD-continue cilostazol  5.  Diabetes melitis type II  6.  CKD stage III     Recommendations:  Check troponin now  Echo ordered  Troponin is negative we will plan for Lexiscan  Continue current medicines.      10/16:    CARDS:    Remains on 4L O2  Denies chest pain, shortness of breath     Objective:  /65 (BP Location: Right arm)   Pulse 83   Temp 98.5 °F (36.9 °C) (Oral)   Resp 22   Ht 5' 6\" (1.676 m)   Wt 149 lb 9.6 oz (67.9 kg)   SpO2 93%   BMI 24.15 kg/m²      Telemetry: SR, 80s      Lab 10/15/24  1151 10/16/24  0623   * 127*   BUN 20 21   CREATSERUM 1.55* 1.57*   EGFRCR 44* 43*   CA 10.0 9.5    142   K 4.2 4.1    108   CO2 25.0 25.0      RBC 3.63* 3.13*   HGB 10.7* 9.0*   HCT 29.4* 25.5*   MCV 81.0 81.5   MCH 29.5 28.8   MCHC 36.4 35.3   RDW 15.4 15.6   NEPRELIM 5.79 6.60   WBC 6.8 7.8   .0 216.0     Physical Exam:  General: Alert, cooperative, no distress, appears stated age.  Neck: Supple, symmetrical, trachea midline, no adenopathy,  thyroid: no enlargment/tenderness/nodules, no carotid bruit and no JVD.  Lungs: bibasilar expiratory wheezes  Chest wall: No tenderness or deformity.  Heart: Regular rate and rhythm, S1, S2 normal, no murmur, click, rub or gallop.  Abdomen: Soft, non-tender. Bowel sounds normal. No masses,  No organomegaly.  Extremities: Extremities normal, atraumatic, no cyanosis, mild BLE edema.  Pulses: 2+ and symmetric all extremities.  Neurologic: Grossly intact.    Scheduled Medications    amLODIPine  10 mg Oral Nightly    aspirin  81 mg Oral Daily    cilostazol  100 mg Oral BID    ferrous sulfate   Oral Daily with breakfast    hydrALAZINE  25 mg Oral BID    losartan  50 mg Oral BID    rosuvastatin  10 mg Oral Nightly    tamsulosin  0.4 mg Oral Nightly    heparin  5,000 Units Subcutaneous Q8H ELVIE    insulin aspart  2-10 Units Subcutaneous TID AC and HS           Assessment:     84 year old male with PMH of HTN, HLD, PAD s/p amputation of left big toe, CKD3, DMII who presented with chest pain.     Acute Chest pain   Troponin negative x2 on admission   -CXR notable for cardiomegaly, interstitial pulmonary edema  -proBNP 1,163  -CTA chest negative for PE, revealed moderate pericardial effusion, Echo confirmed moderate effusion without evidence of tamponade, stable BP  -IV lasix given x 1 dose yesterday with 1.2L out, will continue diuresis, Cr stable  -D-dimer elevated 1.75 2/2 effusion  -hold off on lexiscan with current findings of effusion, may consider OP given risk factors     Moderate pericardial effusion   Admitted with chest pain   -CTA/echo revealed moderate pericardial effusion  -Echo revealed LVEF 75-80%, no RWMA, PASP 35-40mmHg, moderate to severe mitral stenosis  -D-dimer 1.75  -ESR/CRP pending  -IV lasix given with good response, will continue  -Hgb dropped 1g overnight, 10.7 on admission, today 9.0  -WBC unremarkable  -remains on supplemental O2 4L     Moderate to severe Mitral Stenosis  Mean gradient 12mmHg  -will  monitor, may consider valve evaluation     HTN  -150s  -amlodipine, losartan  -not on home coreg, hydralazine,, will resume with slightly higher BP     HLD  Rosuvastatin  -will check lipid panel      DMII-A1c 6.1, metformin PTA     PAD  S/p left big toe amputation  -cilostazol, asa, statin     Leg swelling  Duplex negative for DVT     CKD3  Cr stable 1.5  -continue IV diuresis with moderate effusion      Urinary Retention   Joe in place     Plan:  -Monitor strict I/Os, daily weights, daily BMP  -Resume home coreg dosing for better BP control   -Continue amlodipine, losartan  -Continue asa, statin, cilastazol  -add on labs pending  -monitor daily CBC      HOSPITALIST:     Assessment & Plan:  #Chest Pain  -Serial troponin negative  -Cardiology following  -Aspirin given  -telemetry      #Pericardial effusion on CT  -ECHO reviewed  -ESR slightly elevated, CRP WNL  -Cardiology following     #Elevated ddimer - LLE swelling  -CTA negative for PE  -Pending LLE doppler      #Hypoxia  -? 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  He is being worked up by cardiology for this.  Of note, patient has been requiring 4 L of oxygen.  He denies feeling sick, fevers, chills, or cough.  No diagnosis of COPD.  Patient reports no significant breathing issues prior to this.  Patient is a bit of a poor historian, reports that he quit smoking 20 to 30 years ago, and smoked for about 20 years.     Assessment:  Acute hypoxic respiratory failure, CT scan was reviewed, no evidence of pneumonia but does have moderate emphysema, no wheezing on exam to suggest COPD exacerbation  Chest pain with pericardial effusion on CT scan,  cardiology is following  CKD stage III  Hypertension  Hyperlipidemia  Peripheral arterial disease       Plan:  CT scan was reviewed in detail with patient  Close monitoring of oxygenation status, wean as able, goal saturation 88 to 92%  Start equivalent of Trelegy  Start scheduled bronchodilators with DuoNebs  Check bedside spirometry    10/17:    CARDS:    Denies chest pain, shortness of breath  O2 down to 2L   /60 (BP Location: Right arm)   Pulse 94   Temp 98.5 °F (36.9 °C) (Oral)   Resp 16   Ht 5' 6\" (1.676 m)   Wt 140 lb 10.5 oz (63.8 kg)   SpO2 93%   BMI 22.70 kg/m²      Telemetry: SR, 80s-100s     Intake/Output:     Intake/Output Summary (Last 24 hours) at 10/17/2024 0837  Last data filed at 10/17/2024 0420      Gross per 24 hour   Intake --   Output 2400 ml   Net -2400 ml           Last 3 Weights   10/17/24 0530 140 lb 10.5 oz (63.8 kg)   10/15/24 2059 149 lb 9.6 oz (67.9 kg)   10/15/24 1142 150 lb (68 kg)       Lab 10/15/24  1151 10/16/24  0623 10/17/24  0656   * 127* 142*   BUN 20 21 18   CREATSERUM 1.55* 1.57* 1.39*   EGFRCR 44* 43* 50*   CA 10.0 9.5 10.0    142 140   K 4.2 4.1 3.5    108 104   CO2 25.0 25.0 28.0      RBC 3.63* 3.13* 3.59*   HGB 10.7* 9.0* 10.8*   HCT 29.4* 25.5* 28.9*   MCV 81.0 81.5 80.5   MCH 29.5 28.8 30.1   MCHC 36.4 35.3 37.4*   RDW 15.4 15.6 15.4   NEPRELIM 5.79 6.60 7.34   WBC 6.8 7.8 8.5   .0 216.0 206.0     Physical Exam:  General: Alert, cooperative, no distress, appears stated age.  Neck: Supple, symmetrical, trachea midline, no adenopathy, thyroid: no enlargment/tenderness/nodules, no carotid bruit and no JVD.  Lungs: clear to auscultation bilaterally  Chest wall: No tenderness or deformity.  Heart: Regular rate and rhythm, S1, S2 normal, no murmur, click, rub or gallop.  Abdomen: Soft, non-tender. Bowel sounds normal. No masses,  No organomegaly.  Extremities: Extremities normal, atraumatic, no cyanosis, trace BLE edema.  Pulses: 2+ and  symmetric all extremities.  Neurologic: Grossly intact.       Assessment:     84 year old male with PMH of HTN, HLD, PAD s/p amputation of left big toe, CKD3, DMII who presented with chest pain.     Acute Chest pain   Troponin negative x2 on admission   -CXR notable for cardiomegaly, interstitial pulmonary edema  -proBNP 1,163  -CTA chest negative for PE, revealed moderate pericardial effusion, Echo confirmed moderate effusion without evidence of tamponade, stable BP  -IV lasix given x 1 dose yesterday with 2.4L out, no need for further diuresis  -D-dimer elevated 1.75 2/2 effusion  -hold off on lexiscan with current findings of effusion, may consider OP given risk factors     Moderate pericardial effusion   Admitted with chest pain   -CTA/echo revealed moderate pericardial effusion  -Echo revealed LVEF 75-80%, no RWMA, PASP 35-40mmHg, moderate to severe mitral stenosis  -D-dimer 1.75  -unsure of cause, possibly viral?  -Hgb dropped 1g, now back to 10.8  -WBC unremarkable  -remains on supplemental O2 4L  -ESR 28, CRP <0.40  -repeat limited echo tomorrow to monitor size of effusion     Moderate to severe Mitral Stenosis  Mean gradient 12mmHg  -will monitor  -goal to keep HR controlled, will add diltiazem     HTN  -150s  -amlodipine, losartan, coreg  -BP still elevated, will increase hydralazine  -adding dilitiazem for HR control, will stop amlodipine     HLD  Rosuvastatin, LDL 49     DMII-A1c 6.1, metformin PTA     PAD  S/p left big toe amputation  -cilostazol, asa, statin     Leg swelling  Duplex negative for DVT     CKD3  Cr stable 1.39     Urinary Retention   Joe in place     Plan:  -Start dilitiazem 30mg QID, stop amlodipine-if BP tolerates, will discharge on long acting CR  -Continue losartan, coreg  -Increase hydralazine to 50mg BID-give one time dose of 25mg now  -Continue asa, statin, cilastazol  -monitor daily CBC  -monitor BP with medication changes  -limited echo tomorrow to evaluate effusion  size      HOSPITALIST:    Assessment & Plan:  #Chest Pain  -Serial troponin negative  -Cardiology following  -Aspirin given  -telemetry      #Pericardial effusion on CT  -ECHO reviewed - plan for repeat tomorrow   -ESR slightly elevated, CRP WNL  -Cardiology following     #Elevated ddimer - LLE swelling  -CTA negative for PE  -LLE doppler negative      #Hypoxia  -? Secondary to emphysema - patient quit smoking 40 years ago  -CTA chest without PE/effusion/PNA  -Pulmonary following - nebs/inhalers started  -Wean oxygen as able       #CKD III  -monitor      #HTN  -Amlodipine, Carvedilol, Hydralazine, Losartan     #DL  -Statin      #DMI  -ISS     #PAD  -Cilostazol     #BPH  -flomax     MEDICATIONS ADMINISTERED IN LAST 1 DAY:    heparin (Porcine) 5000 UNIT/ML injection 5,000 Units       Date Action Dose Route User    10/17/2024 0531 Given 5,000 Units Subcutaneous (Left Upper Abdomen) Mahsa Arenas RN    10/16/2024 2104 Given 5,000 Units Subcutaneous (Left Upper Abdomen) Mahsa Arenas RN    10/16/2024 1422 Given 5,000 Units Subcutaneous (Left Lower Abdomen) Orlando Weri, CARLEY       ipratropium-albuterol (Duoneb) 0.5-2.5 (3) MG/3ML inhalation solution 3 mL       Date Action Dose Route User    10/17/2024 0821 Given 3 mL Nebulization Rikki Villegas, WANDER    10/16/2024 1953 Given 3 mL Nebulization Lawrence Schultz, WANDER          ipratropium-albuterol (Duoneb) 0.5-2.5 (3) MG/3ML inhalation solution       Date Action Dose Route User    10/16/2024 1633 Given 3 mL (none) Dominick Dang, WANDER          Vitals (last day)       Date/Time Temp Pulse Resp BP SpO2 Weight O2 Device O2 Flow Rate (L/min) Who    10/17/24 1206 -- -- -- -- 92 % -- Nasal cannula 1 L/min AR    10/17/24 1202 -- -- -- -- 87 % -- None (Room air) -- AR    10/17/24 1157 -- -- -- -- 90 % -- None (Room air) -- AR    10/17/24 1111 97.6 °F (36.4 °C) -- 18 -- -- -- -- -- FD    10/17/24 1111 -- 93 -- 155/80 94 % -- Nasal cannula 1 L/min AR    10/17/24 0848 -- -- --  -- 94 % -- Nasal cannula 2 L/min AR    10/17/24 0825 -- -- -- -- -- -- Nasal cannula -- MA    10/17/24 0825 98.4 °F (36.9 °C) 102 18 152/77 98 % -- -- -- FD    10/17/24 0825 -- -- -- -- -- -- -- 4 L/min AR    10/17/24 0530 -- -- -- -- -- 140 lb 10.5 oz (63.8 kg) -- -- CD    10/17/24 0420 -- 94 -- -- -- -- -- -- RS    10/17/24 0343 98.5 °F (36.9 °C) 93 16 122/60 -- -- -- -- RS    10/16/24 2255 98.6 °F (37 °C) 95 18 146/67 93 % -- -- -- RS    10/16/24 1851 98.4 °F (36.9 °C) 88 19 150/69 92 % -- -- -- RS    10/16/24 1656 98.4 °F (36.9 °C) 80 20 152/74 89 % -- Nasal cannula 4 L/min AL    10/16/24 1259 -- -- -- -- 94 % -- Nasal cannula 4 L/min KH    10/16/24 1151 98.6 °F (37 °C) 78 20 130/74 90 % -- Nasal cannula 4 L/min AL    10/16/24 0824 98.5 °F (36.9 °C) 83 22 137/65 93 % -- Nasal cannula 4 L/min AL    10/16/24 0436 97.8 °F (36.6 °C) 87 22 142/61 93 % -- Nasal cannula 4 L/min MC     0/15/24 2300 98.6 °F (37 °C) 96 20 154/65 94 % -- Nasal cannula 4 L/min MC   10/15/24 2059 -- 98 -- 139/76 -- 149 lb 9.6 oz (67.9 kg) -- -- YN   10/15/24 2055 -- 99 -- 154/74 -- -- -- -- YN   10/15/24 2051 -- 95 22 144/68 -- -- -- -- YN   10/15/24 1800 -- 93 25 154/73 94 % -- -- -- MM   10/15/24 1715 -- 88 -- 156/145 Abnormal  93 % -- -- 4 L/min MM   10/15/24 1615 -- 87 15 149/68 90 % -- Nasal cannula 3 L/min JR   10/15/24 1530 -- 84 16 146/67 94 % -- Nasal cannula 3 L/min JR   10/15/24 1515 -- 81 17 143/76 93 % -- Nasal cannula 3 L/min JR   10/15/24 1500 -- 82 17 142/68 92 % -- -- -- JR   10/15/24 1445 -- 81 17 134/72 92 % -- Nasal cannula 3 L/min JR   10/15/24 1430 -- 81 20 142/69 93 % -- Nasal cannula 3 L/min JR   10/15/24 1415 -- 83 16 152/79 93 % -- Nasal cannula 3 L/min    10/15/24 1400 -- 80 16 145/65 92 % -- Nasal cannula 3 L/min JR   10/15/24 1330 -- 78 16 129/65 94 % -- Nasal cannula 3 L/min JR   10/15/24 1315 -- 73 17 142/80 97 % -- Nasal cannula 3 L/min JR   10/15/24 1300 -- 76 17 139/77 96 % -- Nasal cannula 3 L/min JR    10/15/24 1245 -- 79 19 147/73 98 % -- Nasal cannula 3 L/min    10/15/24 1236 -- -- -- -- -- -- Nasal cannula 3 L/min    10/15/24 1230 -- 76 18 144/67 96 % -- Nasal cannula 3 L/min    10/15/24 1215 -- 83 15 132/63 92 % -- Nasal cannula 3 L/min    10/15/24 1200 -- 77 20 144/69 95 % -- None (Room air) --    10/15/24 1142 96.8 °F (36 °C) 70 18 140/65 92 % 150 lb (68 kg) None (Room air) --

## 2024-10-18 ENCOUNTER — APPOINTMENT (OUTPATIENT)
Dept: CV DIAGNOSTICS | Facility: HOSPITAL | Age: 84
End: 2024-10-18
Attending: NURSE PRACTITIONER
Payer: MEDICARE

## 2024-10-18 VITALS
HEART RATE: 92 BPM | HEIGHT: 66 IN | OXYGEN SATURATION: 94 % | RESPIRATION RATE: 18 BRPM | SYSTOLIC BLOOD PRESSURE: 150 MMHG | WEIGHT: 142 LBS | TEMPERATURE: 98 F | BODY MASS INDEX: 22.82 KG/M2 | DIASTOLIC BLOOD PRESSURE: 70 MMHG

## 2024-10-18 LAB
ANION GAP SERPL CALC-SCNC: 7 MMOL/L (ref 0–18)
BASOPHILS # BLD AUTO: 0.01 X10(3) UL (ref 0–0.2)
BASOPHILS NFR BLD AUTO: 0.1 %
BUN BLD-MCNC: 19 MG/DL (ref 9–23)
CALCIUM BLD-MCNC: 10 MG/DL (ref 8.7–10.4)
CHLORIDE SERPL-SCNC: 105 MMOL/L (ref 98–112)
CO2 SERPL-SCNC: 28 MMOL/L (ref 21–32)
CREAT BLD-MCNC: 1.28 MG/DL
EGFRCR SERPLBLD CKD-EPI 2021: 55 ML/MIN/1.73M2 (ref 60–?)
EOSINOPHIL # BLD AUTO: 0.01 X10(3) UL (ref 0–0.7)
EOSINOPHIL NFR BLD AUTO: 0.1 %
ERYTHROCYTE [DISTWIDTH] IN BLOOD BY AUTOMATED COUNT: 15.2 %
GLUCOSE BLD-MCNC: 122 MG/DL (ref 70–99)
GLUCOSE BLD-MCNC: 124 MG/DL (ref 70–99)
GLUCOSE BLD-MCNC: 143 MG/DL (ref 70–99)
GLUCOSE BLD-MCNC: 180 MG/DL (ref 70–99)
HCT VFR BLD AUTO: 32.4 %
HGB BLD-MCNC: 11.5 G/DL
IMM GRANULOCYTES # BLD AUTO: 0.04 X10(3) UL (ref 0–1)
IMM GRANULOCYTES NFR BLD: 0.5 %
LYMPHOCYTES # BLD AUTO: 0.66 X10(3) UL (ref 1–4)
LYMPHOCYTES NFR BLD AUTO: 8.5 %
MCH RBC QN AUTO: 28.9 PG (ref 26–34)
MCHC RBC AUTO-ENTMCNC: 35.5 G/DL (ref 31–37)
MCV RBC AUTO: 81.4 FL
MONOCYTES # BLD AUTO: 0.6 X10(3) UL (ref 0.1–1)
MONOCYTES NFR BLD AUTO: 7.7 %
NEUTROPHILS # BLD AUTO: 6.48 X10 (3) UL (ref 1.5–7.7)
NEUTROPHILS # BLD AUTO: 6.48 X10(3) UL (ref 1.5–7.7)
NEUTROPHILS NFR BLD AUTO: 83.1 %
OSMOLALITY SERPL CALC.SUM OF ELEC: 295 MOSM/KG (ref 275–295)
PLATELET # BLD AUTO: 201 10(3)UL (ref 150–450)
POTASSIUM SERPL-SCNC: 4 MMOL/L (ref 3.5–5.1)
RBC # BLD AUTO: 3.98 X10(6)UL
SODIUM SERPL-SCNC: 140 MMOL/L (ref 136–145)
WBC # BLD AUTO: 7.8 X10(3) UL (ref 4–11)

## 2024-10-18 PROCEDURE — 93308 TTE F-UP OR LMTD: CPT | Performed by: NURSE PRACTITIONER

## 2024-10-18 PROCEDURE — 99232 SBSQ HOSP IP/OBS MODERATE 35: CPT | Performed by: INTERNAL MEDICINE

## 2024-10-18 PROCEDURE — 99239 HOSP IP/OBS DSCHRG MGMT >30: CPT | Performed by: HOSPITALIST

## 2024-10-18 PROCEDURE — 93325 DOPPLER ECHO COLOR FLOW MAPG: CPT | Performed by: NURSE PRACTITIONER

## 2024-10-18 PROCEDURE — 93321 DOPPLER ECHO F-UP/LMTD STD: CPT | Performed by: NURSE PRACTITIONER

## 2024-10-18 RX ORDER — IPRATROPIUM BROMIDE AND ALBUTEROL SULFATE 2.5; .5 MG/3ML; MG/3ML
3 SOLUTION RESPIRATORY (INHALATION) EVERY 6 HOURS PRN
Status: DISCONTINUED | OUTPATIENT
Start: 2024-10-18 | End: 2024-10-18

## 2024-10-18 RX ORDER — DILTIAZEM HYDROCHLORIDE 120 MG/1
120 CAPSULE, EXTENDED RELEASE ORAL DAILY
Status: DISCONTINUED | OUTPATIENT
Start: 2024-10-18 | End: 2024-10-18

## 2024-10-18 RX ORDER — HYDRALAZINE HYDROCHLORIDE 50 MG/1
50 TABLET, FILM COATED ORAL 2 TIMES DAILY
Qty: 60 TABLET | Refills: 0 | Status: SHIPPED | OUTPATIENT
Start: 2024-10-18 | End: 2024-11-17

## 2024-10-18 RX ORDER — DILTIAZEM HYDROCHLORIDE 120 MG/1
120 CAPSULE, COATED, EXTENDED RELEASE ORAL DAILY
Qty: 90 CAPSULE | Refills: 0 | Status: SHIPPED | OUTPATIENT
Start: 2024-10-18

## 2024-10-18 NOTE — PLAN OF CARE
Assumed pt care at 0730. A&O x 4. On 1L O2 via NC. Denies pain. Vital signs stable, NSR on tele. Joe in place. Up with x1 assist and walker.     Plan of care: wean O2, echo.    Plan of care updated with patient and daughter. Questions answered, pt verbalized understanding. Call light is within reach. All needs met at this time.    Problem: Diabetes/Glucose Control  Goal: Glucose maintained within prescribed range  Description: INTERVENTIONS:  - Monitor Blood Glucose as ordered  - Assess for signs and symptoms of hyperglycemia and hypoglycemia  - Administer ordered medications to maintain glucose within target range  - Assess barriers to adequate nutritional intake and initiate nutrition consult as needed  - Instruct patient on self management of diabetes  Outcome: Progressing     Problem: Patient/Family Goals  Goal: Patient/Family Long Term Goal  Description: Patient's Long Term Goal: discharge home    Interventions:  - Hospitalist/Cards, labs, trops, chest xray, CT chest/abd/pelvis, prn antiemetics, ECHO, stress test  - See additional Care Plan goals for specific interventions  Outcome: Progressing  Goal: Patient/Family Short Term Goal  Description: Patient's Short Term Goal: resolve n/v, control pain    Interventions:   - prn zofran/reglan, nitroglycerin, aspirin, lasix  - See additional Care Plan goals for specific interventions  Outcome: Progressing

## 2024-10-18 NOTE — PROGRESS NOTES
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    Overnight: no acute events overnight, off oxygen    Objective:  /77 (BP Location: Right arm)   Pulse 90   Temp 97.7 °F (36.5 °C) (Oral)   Resp 18   Ht 5' 6\" (1.676 m)   Wt 141 lb 15.6 oz (64.4 kg)   SpO2 94%   BMI 22.92 kg/m²     Temp (24hrs), Av.1 °F (36.7 °C), Min:97.7 °F (36.5 °C), Max:98.4 °F (36.9 °C)      Intake/Output:    Intake/Output Summary (Last 24 hours) at 10/18/2024 1526  Last data filed at 10/18/2024 1204  Gross per 24 hour   Intake 60 ml   Output 1650 ml   Net -1590 ml       Physical Exam:   General: alert, cooperative, oriented.  No respiratory distress.   Head: Normocephalic, without obvious abnormality, atraumatic.   Throat: Lips, mucosa, and tongue normal.  No thrush noted.   Neck: trachea midline, no adenopathy, no thyromegaly. No JVD.   Lungs: clear to auscultation bilaterally   Chest wall: No tenderness or deformity.   Heart: regular rate and rhythm, S1, S2 normal, no murmur, click, rub or gallop   Abdomen: soft, non-distended, no masses, no guarding, no     Rebound.   Extremity: No edema or cyanosis   Skin: No rashes or lesions.   Neurological: Alert, interactive, no focal deficits    Lab Data Review:  Recent Labs     10/15/24  1151 10/16/24  0623 10/17/24  0656   WBC 6.8 7.8 8.5   HGB 10.7* 9.0* 10.8*   .0 216.0 206.0     Recent Labs     10/15/24  1151 10/16/24  0623 10/17/24  0656    142 140   K 4.2 4.1 3.5    108 104   CO2 25.0 25.0 28.0   BUN 20 21 18   CREATSERUM 1.55* 1.57* 1.39*     Recent Labs   Lab 10/15/24  1223   PTP 15.4*   INR 1.21*   PTT 29.0       Cultures:   No results found for this visit on 10/15/24.    Radiology:  CARD ECHO LIMITED  (WOF=[id number])  Transthoracic Echocardiogram    Name:Lawyer Vira    Date: 10/18/2024 :  1940 Ht:  (66in)  BP: 146 / 76  MRN:  [id number]    Age:  84years    Wt:  (149lb) HR: 81bpm  Loc:  ED       Gndr: M          BSA: 1.76m^2  Sonographer: Chante SANDERS    Ordering:    Lisa Dudley  Consulting:  Rikki Gamez    ----------------------------------------------------------------------------  History/Indications:   Pericardial effusion.    ----------------------------------------------------------------------------  Procedure information:  A transthoracic echocardiogram, limited study was  performed. Additional evaluation included M-mode and limited 2D.  Patient  status:  Inpatient.  Location:  [place] [address].    Comparison was made to the study  of 10/15/2024.    This was a routine study. Transthoracic echocardiography  for ventricular function evaluation. Image quality was adequate.    ECG rhythm:   Normal sinus    ----------------------------------------------------------------------------    Conclusions:    1. Left ventricle: The cavity size was normal. Wall thickness was mildly     increased. Systolic function was normal. The estimated ejection fraction     was 65-70%, by visual assessment. No diagnostic evidence for regional     wall motion abnormalities. Doppler parameters are consistent with     abnormal left ventricular relaxation - grade 1 diastolic dysfunction.  2. Left atrium: The atrium was moderately dilated.  3. Aortic root: The aortic root was 3.7cm diameter.  4. Ascending aorta: The ascending aorta was 3.5cm diameter.  5. Mitral valve: The annulus was moderately calcified. The leaflets were     normal thickness. The mean diastolic gradient was 10mm Hg at a HR of     81bpm.  6. Pericardium, extracardiac: A moderate pericardial effusion was     identified. There was no evidence of hemodynamic compromise.  Impressions:  This study is compared with previous dated 10-15-24:  Pericardial effusion is  largely unchanged.  *    ----------------------------------------------------------------------------  *  Findings:  Left ventricle:  The cavity size was normal. Wall thickness was mildly  increased. Systolic function was normal. The estimated ejection fraction was  65-70%, by visual assessment. No diagnostic evidence for regional wall  motion abnormalities. Doppler parameters are consistent with abnormal left  ventricular relaxation - grade 1 diastolic dysfunction.  Left atrium:  The atrium was moderately dilated.  Right ventricle:  The cavity size was normal. Systolic function was normal.  Right atrium:  The atrium was normal in size.  Mitral valve:  The annulus was moderately calcified. The leaflets were  normal thickness.  Doppler:  Transvalvular velocity was increased. The  findings were consistent with stenosis. There was trivial regurgitation.  The planimetered valve area was 1.36cm^2. The valve area index by planimetry  was 0.77cm^2/m^2. The valve area (LVOT continuity) was 1.77cm^2. The valve  area index (LVOT continuity) was 1cm^2/m^2.    The mean diastolic gradient  was 10mm Hg. At a HR of 81bpm.  Aortic valve:   The valve was trileaflet. The leaflets were mildly thickened  and mildly calcified.  Tricuspid valve:  The valve is structurally normal. Leaflet separation was  normal.  Pulmonic valve:   The valve is structurally normal. Cusp separation was  normal.  Pericardium:  A moderate pericardial effusion was identified. There was no  evidence of hemodynamic compromise.  Aorta:  Aortic root: The aortic root was 3.7cm diameter.  Ascending aorta: The ascending aorta was 3.5cm diameter.  Pulmonary arteries:  The main pulmonary artery was normal-sized.  Systemic veins:  Central venous respirophasic diameter changes are blunted  (< 50%).  Inferior vena cava: The IVC was dilated.    ----------------------------------------------------------------------------  Measurements     Left ventricle       Value           Ref       10/15/2024   IVS thickness,   (H) 1.1   cm       0.6 - 1.0 1.0   ED, PLAX   LV ID, ED, PLAX  (L) 4.1   cm       4.2 - 5.8 4.8   LV ID, ES, PLAX  (L) 2.2   cm       2.5 - 4.0 2.4   LV PW thickness,     1.0   cm       0.6 - 1.0 1.0   ED, PLAX   IVS/LV PW ratio,     1.02           --------- 1.01   ED, PLAX   LV PW/LV ID          0.25           --------- 0.2   ratio, ED, PLAX   LV ejection      (H) 77    %        52 - 72   81   fraction   Stroke               51    ml/m^2   --------- 58   volume/bsa, 2D     LVOT                 Value          Ref       10/15/2024   LVOT ID              2.1   cm       --------- 2   LVOT peak            1.32  m/sec    --------- 1.23   velocity, S   LVOT VTI, S          26.0  cm       --------- 32.8   LVOT peak            7     mm Hg    --------- 6   gradient, S   LVOT mean            4     mm Hg    --------- 3   gradient, S   Stroke volume        90    ml       --------- 103   (SV), LVOT DP   Stroke index         51    ml/m^2   --------- 58   (SV/bsa), LVOT   DP     Aortic root          Value          Ref       10/15/2024   Aortic root ID,      3.7   cm       2.5 - 4.0 4.2   ED     Ascending aorta      Value          Ref       10/15/2024   Ascending aorta      3.5   cm       2.2 - 3.8 3.5   ID, A-P, ED     Left atrium          Value          Ref       10/15/2024   LA ID, A-P, ES       4.0   cm       3.0 - 4.0 ----------   LA/aortic root       1.08           --------- ----------   ratio     Mitral valve         Value          Ref       10/15/2024   Mitral valve         1.36  cm^2     --------- ----------   area, planimetry   Mitral valve         0.77  cm^2/m^2 --------- ----------   area/bsa,   planimetry   Mitral mean          10    mm Hg    --------- 12   gradient, D   Mitral peak          18    mm Hg    --------- 22   gradient, D   Mitral valve         1.77  cm^2     --------- 1.80   area, LVOT   continuity   Mitral valve         1     cm^2/m^2 --------- 1.02   area/bsa,  LVOT   continuity   Mitral regurg        50.9  cm       --------- 57.2   VTI, PISA     Right ventricle      Value          Ref       10/15/2024   TAPSE, 2D            3.00  cm       >=1.70    ----------   RV s', lateral       15.7  cm/sec   >=9.5     ----------  Legend:  (L)  and  (H)  ailyn values outside specified reference range.    ----------------------------------------------------------------------------    Prepared and electronically signed by  Rodney Sousa  10/18/2024 11:52      Medications reviewed     Assessment and Plan:   Patient Active Problem List   Diagnosis    Community acquired pneumonia    Community acquired pneumonia, unspecified laterality    LA (acute kidney injury) (HCC)    Urinary tract infection without hematuria    Rhinovirus    Memory change    Anemia    Hyperglycemia    Acute chest pain    Hypoxia    Acute on chronic congestive heart failure, unspecified heart failure type (HCC)    Pericardial effusion (HCC)       Assessment:  Acute hypoxic respiratory failure, CT scan was reviewed, no evidence of pneumonia but does have moderate emphysema, no wheezing on exam to suggest COPD exacerbation  Chest pain with pericardial effusion on CT scan, cardiology is following  CKD stage III  Hypertension  Hyperlipidemia  Peripheral arterial disease    Plan:  Monitor on room air  Ambulate in hallways and assess oxygen needs  If no requirements would be ok for dc from my perspective  Fu bedside spirometry  Would plan to send home on breztri or equivalent   Will sign off please call with questions        Jennifer Hutson MD  [facility] Pulmonary Medicine  Office: [phone]

## 2024-10-18 NOTE — PLAN OF CARE
Assumed patient at 1930. Alert and oriented x 4 on 1L nasal canula. Lung sounds diminished bilaterally. Normal sinus on tele. Continent of bowel, Incontinent of bladder - moctezuma in place. Up 1 and a walker. Patient updated on plan of care and verbalized understanding.     POC: wean O2, repeat echo, physical therapy/OT to see, scheduled nebs.     Problem: Diabetes/Glucose Control  Goal: Glucose maintained within prescribed range  Description: INTERVENTIONS:  - Monitor Blood Glucose as ordered  - Assess for signs and symptoms of hyperglycemia and hypoglycemia  - Administer ordered medications to maintain glucose within target range  - Assess barriers to adequate nutritional intake and initiate nutrition consult as needed  - Instruct patient on self management of diabetes  Outcome: Progressing     Problem: Patient/Family Goals  Goal: Patient/Family Long Term Goal  Description: Patient's Long Term Goal: discharge home    Interventions:  - Hospitalist/Cards, labs, trops, chest xray, CT chest/abd/pelvis, prn antiemetics, ECHO, stress test  - See additional Care Plan goals for specific interventions  Outcome: Progressing  Goal: Patient/Family Short Term Goal  Description: Patient's Short Term Goal: resolve n/v, control pain    Interventions:   - prn zofran/reglan, nitroglycerin, aspirin, lasix  - See additional Care Plan goals for specific interventions  Outcome: Progressing

## 2024-10-18 NOTE — CM/SW NOTE
10/18/24 1500   Discharge disposition   Expected discharge disposition Home or Self   Discharge transportation Private car     LUNA notified by RN that pt will DC today back to his daughter's house. Per Rn, pt is now on RA and will not need home O2 at DC.    RN also notified LUNA that pt's daughter will be coming by to transport pt back home.    LUNA received a VM from pt's daughter, Octavio, requesting a callback.    LUNA called Octavio ([phone]) and discussed pt's DC plan. Octavio aware that pt declined HH services and that he will not need home O2 at DC. LUNA notified her that pt is able to follow up with his PCP back in [place] if he does feel like PT is needed later on. Octavio confirmed that she will be transporting pt back to her home. She thanked LUNA and denied having any further questions at this time.     to remain available for support and/or discharge planning.    Teresa Carbone ROSALVA  Discharge Planner  [phone]

## 2024-10-18 NOTE — PROGRESS NOTES
[facility]   part of Merged with [facility]     Hospitalist Progress Note     Lawyer Vira MartinsKathi Patient Status:  Inpatient    1940 MRN [id number]   Location [facility] 2NE-A Attending Gualberto Braga,    Hosp Day # 3 PCP Laura Marino MD     Chief Complaint: Chest pain    Subjective:     Patient seen and examined. Denies CP/SOB. Hopefull to go home today.     Objective:    Review of Systems:   A comprehensive review of systems was completed; pertinent positive and negatives stated in subjective.    Vital signs:  Temp:  [97.7 °F (36.5 °C)-98.4 °F (36.9 °C)] 97.7 °F (36.5 °C)  Pulse:  [] 90  Resp:  [15-18] 18  BP: (146-175)/(71-88) 157/77  SpO2:  [89 %-94 %] 94 %    Physical Exam:    General: No acute distress  Respiratory: Diminished bilaterally.   Cardiovascular: S1, S2, regular rate and rhythm  Abdomen: Soft, Non-tender, non-distended, positive bowel sounds  Neuro: No new focal deficits.   Extremities: No edema    Diagnostic Data:    Labs:  Recent Labs   Lab 10/15/24  1151 10/15/24  1223 10/16/24  0623 10/17/24  0656 10/18/24  0544   WBC 6.8  --  7.8 8.5 7.8   HGB 10.7*  --  9.0* 10.8* 11.5*   MCV 81.0  --  81.5 80.5 81.4   .0  --  216.0 206.0 201.0   INR  --  1.21*  --   --   --        Recent Labs   Lab 10/15/24  1151 10/16/24  0623 10/17/24  0656 10/17/24  1611 10/18/24  0553   * 127* 142*  --  143*   BUN 20 21 18  --  19   CREATSERUM 1.55* 1.57* 1.39*  --  1.28   CA 10.0 9.5 10.0  --  10.0   ALB 4.9*  --   --   --   --     142 140  --  140   K 4.2 4.1 3.5 4.3 4.0    108 104  --  105   CO2 25.0 25.0 28.0  --  28.0   ALKPHO 84  --   --   --   --    AST 21  --   --   --   --    ALT 13  --   --   --   --    BILT 0.8  --   --   --   --    TP 8.6*  --   --   --   --        Estimated Creatinine Clearance: 38.1 mL/min (based on SCr of 1.28 mg/dL).    Recent Labs   Lab 10/15/24  1151 10/15/24  1344   TROPHS 9 8       Recent Labs   Lab 10/15/24  1223   PTP 15.4*   INR 1.21*                   Microbiology    No results found for this visit on 10/15/24.      Imaging: Reviewed in Epic.    Medications:    dilTIAZem ER  120 mg Oral Daily    hydrALAZINE  50 mg Oral BID    carvedilol  25 mg Oral BID with meals    fluticasone-salmeterol  1 puff Inhalation 2 times daily    And    umeclidinium bromide  1 puff Inhalation Daily    aspirin  81 mg Oral Daily    cilostazol  100 mg Oral BID    ferrous sulfate   Oral Daily with breakfast    losartan  50 mg Oral BID    rosuvastatin  10 mg Oral Nightly    tamsulosin  0.4 mg Oral Nightly    heparin  5,000 Units Subcutaneous Q8H ELVIE    insulin aspart  2-10 Units Subcutaneous TID AC and HS       Assessment & Plan:      #Chest Pain  -Serial troponin negative  -Cardiology following  -Aspirin given  -telemetry      #Pericardial effusion on CT  -Repeat echo reviewed - moderate pericardial effusion still present - no further w/u per cardiology at this time - to f/u OP   -ESR slightly elevated, CRP WNL  -Cardiology following     #Elevated ddimer - LLE swelling  -CTA negative for PE  -LLE doppler negative     #Hypoxia  -? Secondary to emphysema - patient quit smoking 40 years ago  -CTA chest without PE/effusion/PNA  -Pulmonary following - nebs/inhalers started  -Wean oxygen as able    -Desat screen performed - requiring O2 with activity - may require home O2 at DC     #CKD III  -monitor      #HTN  -Amlodipine, Carvedilol, Hydralazine, Losartan     #DL  -Statin      #DMI  -ISS     #PAD  -Cilostazol     #BPH  -flomax    #Disposition  -DC planning if/when ok with pulmonary       Gualberto Omi, DO    Supplementary Documentation:     Quality:  DVT Mechanical Prophylaxis:     Early ambuation  DVT Pharmacologic Prophylaxis   Medication    heparin (Porcine) 5000 UNIT/ML injection 5,000 Units         DVT Pharmacologic prophylaxis: Aspirin 81 mg      Code Status: Not on file  Joe: Joe catheter in place  Joe Duration (in days): 2  Central line:    ARTHUR:  10/18/2024    Discharge is dependent on: clinical progress  At this point Mr. Constantino is expected to be discharge to: home    The 21st Century Cures Act makes medical notes like these available to patients in the interest of transparency. Please be advised this is a medical document. Medical documents are intended to carry relevant information, facts as evident, and the clinical opinion of the practitioner. The medical note is intended as peer to peer communication and may appear blunt or direct. It is written in medical language and may contain abbreviations or verbiage that are unfamiliar.

## 2024-10-18 NOTE — PROGRESS NOTES
10/18/24 1205   Mobility   O2 walk? Yes   SPO2% on Room Air at Rest 88   At rest oxygen flow (liters per minute) 0   SPO2% Ambulation on Room Air 86   SPO2% Ambulation on Oxygen 91   Ambulation oxygen flow (liters per minute) 2

## 2024-10-18 NOTE — PLAN OF CARE
Discharge Note    Patient discharged at 1805. Explained discharge instructions and follow ups to patient and daughter. Verbalizes understanding, IV removed, tele monitor discontinued. Patient transported via wheelchair to [facility].

## 2024-10-18 NOTE — DISCHARGE SUMMARY
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     Patient developed chest pain this morning.  Was a severe dull ache.  Associated with nausea and emesis.  Currently has resolved.  Also more recent swelling of his left lower extremity although reports that this is somewhat chronic.  No recent illness.  No other complaints except for some exertional shortness of breath which is also recent.    Brief Synopsis:     #Chest Pain  -Resolved   -Serial troponin negative  -Echo reviewed   -Cardiology following  -Aspirin given  -telemetry      #Pericardial effusion on CT  -Repeat echo reviewed - moderate pericardial effusion still present - no further w/u per cardiology at this time - to f/u OP   -ESR slightly elevated, CRP WNL  -Cardiology following     #Elevated ddimer - LLE swelling  -CTA negative for PE  -LLE doppler negative   -Edema resolved with diuresis      #Hypoxia  -Improved   -? Secondary to emphysema - patient quit smoking 40 years ago  -CTA chest without PE/effusion/PNA  -Pulmonary following - nebs/inhalers started  -No O2 needed at DC per pulm      #CKD III  -Stable      #HTN  -Amlodipine, Carvedilol, Hydralazine, Losartan     #DL  -Statin      #DMI  -ISS     #PAD  -Cilostazol     #BPH  -flomax     #Disposition  -Stable for DC home    Lace+ Score: 73  59-90 High Risk  29-58 Medium Risk  0-28   Low Risk       TCM Follow-Up  Recommendation:  LACE > 58: High Risk of readmission after discharge from the hospital.      Procedures during hospitalization:   None    Incidental or significant findings and recommendations (brief descriptions):  None    Lab/Test results pending at Discharge:   None    Consultants:  Cardiology  Pulmonary    Discharge Medication List:     Discharge Medications        START taking these medications        Instructions Prescription details   dilTIAZem  MG Cp24  Commonly known as: Cardizem CD      Take 1 capsule (120 mg total) by mouth daily.   Quantity: 90 capsule  Refills: 0            CHANGE how you take these medications        Instructions Prescription details   hydrALAZINE 50 MG Tabs  Commonly known as: Apresoline  What changed:   medication strength  how much to take      Take 1 tablet (50 mg total) by mouth 2 (two) times daily.   Stop taking on: November 17, 2024  Quantity: 60 tablet  Refills: 0            CONTINUE taking these medications        Instructions Prescription details   aspirin 81 MG Tbec      Take 1 tablet (81 mg total) by mouth daily.   Refills: 0     carvedilol 25 MG Tabs  Commonly known as: Coreg      Take 1 tablet (25 mg total) by mouth 2 (two) times daily.   Refills: 0     Cholecalciferol 50 MCG (2000 UT) Tabs      Take 1 tablet (2,000 Units total) by mouth daily.   Refills: 0     cilostazol 100 MG Tabs  Commonly known as: Pletal      Take 1 tablet (100 mg total) by mouth 2 (two) times daily.   Refills: 0     Ferrous Sulfate 325 (65 Fe) MG Tabs      Take 1 tablet (325 mg total) by mouth daily with breakfast.   Refills: 0     losartan 50 MG Tabs  Commonly known as: Cozaar      Take 1 tablet (50 mg total) by mouth 2 (two) times daily.   Refills: 0     metFORMIN 500 MG Tabs  Commonly known as: Glucophage      Take 1 tablet (500 mg total) by mouth daily with breakfast.   Refills: 0     pantoprazole 40 MG Tbec  Commonly known as: Protonix      Take 1 tablet (40 mg total) by mouth every  morning.   Refills: 0     rosuvastatin 10 MG Tabs  Commonly known as: Crestor      Take 1 tablet (10 mg total) by mouth nightly.   Refills: 0     tamsulosin 0.4 MG Caps  Commonly known as: Flomax      Take 1 capsule (0.4 mg total) by mouth nightly.   Refills: 0            STOP taking these medications      amLODIPine 10 MG Tabs  Commonly known as: Norvasc                  Where to Get Your Medications        These medications were sent to [id number] - [place], [place] - [address] AT [facility] [address], [phone], [phone]  [address], [facility] [address]      Phone: [phone]   dilTIAZem  MG Cp24  hydrALAZINE 50 MG Tabs         [facility]    Follow-up appointment:   Sushant Fishman MD  [address] [place] [id number] [phone]    Follow up in 1 week(s)      Appointments for Next 30 Days 10/18/2024 - 2024      None        -----------------------------------------------------------------------------------------------  PATIENT DISCHARGE INSTRUCTIONS: See electronic chart    Gualberto Braga DO    Total time spent on discharge plannin minutes     The  [place] Cures Act makes medical notes like these available to patients in the interest of transparency. Please be advised this is a medical document. Medical documents are intended to carry relevant information, facts as evident, and the clinical opinion of the practitioner. The medical note is intended as peer to peer communication and may appear blunt or direct. It is written in medical language and may contain abbreviations or verbiage that are unfamiliar.

## 2024-10-18 NOTE — PROGRESS NOTES
Progress Note  Lawyer Vira Mccarty Patient Status:  Inpatient    1940 MRN [id number]   Location [facility] 2NE-A Attending Gualberto Braga,    Hosp Day # 3 PCP Laura Marino MD     Subjective:  Mr. Chaney feels \"great\". He has ambulated without chest pain, dyspnea, or palpitations requiring minimal O2.  At rest he is on room air.     Objective:  Physical Exam:   /76 (BP Location: Left arm)   Pulse 93   Temp 97.8 °F (36.6 °C) (Oral)   Resp 18   Ht 5' 6\" (1.676 m)   Wt 141 lb 15.6 oz (64.4 kg)   SpO2 94%   BMI 22.92 kg/m²   Temp (24hrs), Av.1 °F (36.7 °C), Min:97.6 °F (36.4 °C), Max:98.4 °F (36.9 °C)       Intake/Output Summary (Last 24 hours) at 10/18/2024 1100  Last data filed at 10/18/2024 0949  Gross per 24 hour   Intake 60 ml   Output 1100 ml   Net -1040 ml     Wt Readings from Last 3 Encounters:   10/18/24 141 lb 15.6 oz (64.4 kg)   22 147 lb (66.7 kg)   22 147 lb 1.6 oz (66.7 kg)     Telemetry: NSR  General: Alert and oriented in no apparent distress seated comfortably in his exam chair on room air.  HEENT: No focal deficits.  Neck: No JVD, carotids 2+ no bruits.  Cardiac: Regular rate and rhythm, S1, S2 normal, 2/6 murmur, rub or gallop.  Lungs: Dry crackles in the bases that clear with deep inspiration otherwise clear without wheezes, rales, rhonchi or dullness.  Normal excursions and effort.  Abdomen: Soft, non-tender.   Extremities: Without clubbing, cyanosis or edema.  Peripheral pulses are 2+.  Neurologic: Alert and oriented, normal affect.  Skin: Warm and dry.        Intake/Output:    Intake/Output Summary (Last 24 hours) at 10/18/2024 1051  Last data filed at 10/18/2024 0949  Gross per 24 hour   Intake 60 ml   Output 1100 ml   Net -1040 ml       Last 3 Weights   10/18/24 0524 141 lb 15.6 oz (64.4 kg)   10/17/24 0530 140 lb 10.5 oz (63.8 kg)   10/15/24 2059 149 lb 9.6 oz (67.9 kg)   10/15/24 1142 150 lb (68 kg)   22 1032 147 lb (66.7 kg)   22  2200 147 lb 1.6 oz (66.7 kg)   06/24/22 1700 150 lb (68 kg)       Labs:  Recent Labs   Lab 10/16/24  0623 10/17/24  0656 10/17/24  1611 10/18/24  0553   * 142*  --  143*   BUN 21 18  --  19   CREATSERUM 1.57* 1.39*  --  1.28   EGFRCR 43* 50*  --  55*   CA 9.5 10.0  --  10.0    140  --  140   K 4.1 3.5 4.3 4.0    104  --  105   CO2 25.0 28.0  --  28.0     Recent Labs   Lab 10/16/24  0623 10/17/24  0656 10/18/24  0544   RBC 3.13* 3.59* 3.98   HGB 9.0* 10.8* 11.5*   HCT 25.5* 28.9* 32.4*   MCV 81.5 80.5 81.4   MCH 28.8 30.1 28.9   MCHC 35.3 37.4* 35.5   RDW 15.6 15.4 15.2   NEPRELIM 6.60 7.34 6.48   WBC 7.8 8.5 7.8   .0 206.0 201.0         Recent Labs   Lab 10/15/24  1151 10/15/24  1344   TROPHS 9 8       Diagnostics:     TTE LIMITED 10/18:  1. Left ventricle: The cavity size was normal. Wall thickness was mildly      increased. Systolic function was normal. The estimated ejection fraction      was 65-70%, by visual assessment. No diagnostic evidence for regional      wall motion abnormalities. Doppler parameters are consistent with      abnormal left ventricular relaxation - grade 1 diastolic dysfunction.   2. Left atrium: The atrium was moderately dilated.   3. Aortic root: The aortic root was 3.7cm diameter.   4. Ascending aorta: The ascending aorta was 3.5cm diameter.   5. Mitral valve: The annulus was moderately calcified. The leaflets were      normal thickness. The mean diastolic gradient was 10mm Hg at a HR of      81bpm.   6. Pericardium, extracardiac: A moderate pericardial effusion was      identified. There was no evidence of hemodynamic compromise.   Impressions:  This study is compared with previous dated 10-15-24:   Pericardial effusion is largely unchanged.     CTA CHEST + CT ABD (W) + CT PEL (W) (CPT=71275/65241)  Result Date: 10/15/2024  There is no pulmonary embolism to the first subsegmental arterial level.  There is prominent circumferential mural thickening of the urinary  bladder which may represent cystitis.  A neoplastic process cannot be entirely excluded.  Clinical correlation and/or correlation with direct visualization is recommended.  Moderate pericardial effusion is present.  Consider correlation with echocardiogram.  Nonobstructing right renal calculi are present.     LOCATION:  [facility]   Dictated by (CST): Nathan Hutton MD on 10/15/2024 at 5:30 PM     Finalized by (CST): Nathan Hutton MD on 10/15/2024 at 5:42 PM        XR CHEST AP PORTABLE  (CPT=71045)  Result Date: 10/15/2024  1. There is new cardiomegaly.  Recommend echocardiography to exclude pericardial effusion. 2. Prominent pulmonary vascularity and increased interstitial opacities concerning for CHF and interstitial pulmonary edema.    LOCATION:  [facility]      Dictated by ([facility] [[email or web address]]): rC Rock MD on 10/15/2024 at 12:41 PM     Finalized by (CST): Cr Rock MD on 10/15/2024 at 12:42 PM        Echo: 10/15/24  1. Left ventricle: The cavity size was normal. Wall thickness was mildly      increased. Systolic function was hyperdynamic. The estimated ejection      fraction was 75-80%, by visual assessment. No diagnostic evidence for      regional wall motion abnormalities. Left ventricular diastolic function      parameters were normal for the patient's age.   2. Left atrium: The left atrial volume was moderately increased.   3. Mitral valve: The annulus was moderately calcified. Transvalvular      velocity was increased, due to stenosis. The findings were consistent      with moderate to severe stenosis. The mean diastolic gradient was 12mm Hg      at a HR of 81 bpm.   4. Pulmonary arteries: Systolic pressure was mildly increased, in the range      of 35mm Hg to 40mm Hg.   5. Pericardium, extracardiac: A moderate pericardial effusion was      identified. There was no evidence of hemodynamic compromise.   Impressions:  No previous study was available for comparison.   *   Medications:   hydrALAZINE  50 mg Oral BID     dilTIAZem  30 mg Oral 4 times per day    carvedilol  25 mg Oral BID with meals    fluticasone-salmeterol  1 puff Inhalation 2 times daily    And    umeclidinium bromide  1 puff Inhalation Daily    aspirin  81 mg Oral Daily    cilostazol  100 mg Oral BID    ferrous sulfate   Oral Daily with breakfast    losartan  50 mg Oral BID    rosuvastatin  10 mg Oral Nightly    tamsulosin  0.4 mg Oral Nightly    heparin  5,000 Units Subcutaneous Q8H ELVIE    insulin aspart  2-10 Units Subcutaneous TID AC and HS         Assessment/Plan:    84 year old male with PMH of HTN, HLD, PAD s/p amputation of left big toe, CKD3, DMII who presented with chest pain.     Acute Chest pain   -Troponin negative x2 on admission, presenting ECG without acute concerns  -CXR notable for cardiomegaly, interstitial pulmonary edema  -Consider OP stress testing     Moderate pericardial effusion   -CTA/echo revealed moderate pericardial effusion, unchanged on limited echo today  -Echo revealed LVEF 75-80%, no RWMA, PASP 35-40mmHg, moderate to severe mitral stenosis  -D-dimer 1.75  -CRP WNL  -O2 requirments improved     Moderate to severe Mitral Stenosis  -Mean gradient 12 mmHg initially 10 mmHg on repeat limited TTE 10/18  -S/p diuretics     HTN  -BP were elevated  -Home medication,      HLD  Rosuvastatin 10 mg daily  LDL 49 on 10/16     DMII  A1c 6.1     PAD  -S/p left big toe amputation  -cilostazol, asa, statin     Leg swelling  -Duplex negative for DVT     CKD3  -Cr stable 1.28     Urinary Retention   Joe in place    PLAN  Continue ASA 81 mg, Coreg 25 mg BID, Diltiazem  mg daily, Hydralazine 50 mg BID, Losartan 50 mg daily, Crestor 10 mg daily, and Pletal 100 mg BID  Follow up outpatient in 1 week for mitral stenosis and pericardial effusion  We will sign off from a cardiology perspective    Caleb Hodges PA-C  10/18/2024  10:51 AM       Patient seen and examined independently.  Note reviewed and labs reviewed.  Agree to the assessment and plan  with the following additions/changes.  Entire medical decision making & plan performed by myself.      A/P:  Hypoxia responded to bronchodilators. Likely has moderate emphysema per pulmonary.  Moderate pericardial effusion. Unclear cause. Not hemodynamically significant. Would repeat TTE on outpt basis.  Mod-severe MS, possible functional component in a hyperdynamic LV. Would reassess gradient once back to baseline.  Continue medical regimen as above    Follow up in clinic. Cardiology will sign off.    LOC L2    Beto Telles MD  10/18/2024  Interventional Cardiology  [facility] [place]  =======================================================

## 2024-10-19 ENCOUNTER — PATIENT MESSAGE (OUTPATIENT)
Dept: SURGERY | Facility: CLINIC | Age: 84
End: 2024-10-19

## 2024-10-21 ENCOUNTER — TELEPHONE (OUTPATIENT)
Dept: SURGERY | Facility: CLINIC | Age: 84
End: 2024-10-21

## 2024-10-21 NOTE — TELEPHONE ENCOUNTER
My chart message:  Hello, my father  Vira is scheduled for a suprapubic catheter procedure on Thursday. I have been working with Jessica on pre-surgery prep. One thing she needed was a note from his doctor in [facility] that is OK to take him off of his aspirin for seven days. His doctor in [facility] doctor Maxime is a general practitioner and she indicated since he was admitted to the hospital with chest pain that that would have to come from a cardiologist. My Dad was released from [facility] on Friday. Please ask Jessica to reach out to the cardiology group that he saw when he was hospitalized at [place] to get their approval to stop the aspirin for the super pubic catheter surgery. Thanks,  Octavio/Daughter

## 2024-10-21 NOTE — PAYOR COMM NOTE
--------------  DISCHARGE REVIEW    Payor: [employer]  Subscriber #:  [id number]  Authorization Number: [id number]    Admit date: 10/15/24  Admit time:   8:48 PM  Discharge Date: 10/18/2024  6:05 PM     Admitting Physician: Janette Paredes MD  Attending Physician:  No att. providers found  Primary Care Physician: Laura Marino MD          Discharge Summary Notes        Discharge Summary signed by Gualberto Braga DO at 10/20/2024 11:37 AM       Author: Gualberto Braga DO Specialty: HOSPITALIST Author Type: Physician    Filed: 10/20/2024 11:37 AM Date of Service: 10/18/2024  6:32 PM Status: Signed    : Gualberto Braga DO (Physician)           [facility]     Lawyer Vira Mccarty Patient Status:  Inpatient    1940 MRN [id number]   Location [facility] 2NE-A Attending Gualberto Braga DO   Hosp Day # 3 PCP Laura Marino MD     Date of Admission: 10/15/2024  Date of Discharge:   10/18/2024    Discharge Disposition: Home or Self Care    Discharge Diagnosis:  Chest pain  Pericardial effusion  Hypoxia  CKDIII  Essential HTN  Dyslipidemia  DMII  PAD  BPH    History of Present Illness: Lawyer Vira Mccarty is a 84 year old male with past medical history of hypertension, peripheral artery disease presented to the emergency department with chest pain.     Patient developed chest pain this morning.  Was a severe dull ache.  Associated with nausea and emesis.  Currently has resolved.  Also more recent swelling of his left lower extremity although reports that this is somewhat chronic.  No recent illness.  No other complaints except for some exertional shortness of breath which is also recent.    Brief Synopsis:     #Chest Pain  -Resolved   -Serial troponin negative  -Echo reviewed   -Cardiology following  -Aspirin given  -telemetry      #Pericardial effusion on CT  -Repeat echo reviewed - moderate pericardial effusion still present - no further w/u per cardiology at this time - to  f/u OP   -ESR slightly elevated, CRP WNL  -Cardiology following     #Elevated ddimer - LLE swelling  -CTA negative for PE  -LLE doppler negative   -Edema resolved with diuresis      #Hypoxia  -Improved   -? Secondary to emphysema - patient quit smoking 40 years ago  -CTA chest without PE/effusion/PNA  -Pulmonary following - nebs/inhalers started  -No O2 needed at DC per pulm      #CKD III  -Stable      #HTN  -Amlodipine, Carvedilol, Hydralazine, Losartan     #DL  -Statin      #DMI  -ISS     #PAD  -Cilostazol     #BPH  -flomax     #Disposition  -Stable for DC home    Lace+ Score: 73  59-90 High Risk  29-58 Medium Risk  0-28   Low Risk       TCM Follow-Up Recommendation:  LACE > 58: High Risk of readmission after discharge from the hospital.      Procedures during hospitalization:   None    Incidental or significant findings and recommendations (brief descriptions):  None    Lab/Test results pending at Discharge:   None    Consultants:  Cardiology  Pulmonary    Discharge Medication List:     Discharge Medications        START taking these medications        Instructions Prescription details   dilTIAZem  MG Cp24  Commonly known as: Cardizem CD      Take 1 capsule (120 mg total) by mouth daily.   Quantity: 90 capsule  Refills: 0            CHANGE how you take these medications        Instructions Prescription details   hydrALAZINE 50 MG Tabs  Commonly known as: Apresoline  What changed:   medication strength  how much to take      Take 1 tablet (50 mg total) by mouth 2 (two) times daily.   Stop taking on: November 17, 2024  Quantity: 60 tablet  Refills: 0            CONTINUE taking these medications        Instructions Prescription details   aspirin 81 MG Tbec      Take 1 tablet (81 mg total) by mouth daily.   Refills: 0     carvedilol 25 MG Tabs  Commonly known as: Coreg      Take 1 tablet (25 mg total) by mouth 2 (two) times daily.   Refills: 0     Cholecalciferol 50 MCG (2000 UT) Tabs      Take 1 tablet (2,000  Units total) by mouth daily.   Refills: 0     cilostazol 100 MG Tabs  Commonly known as: Pletal      Take 1 tablet (100 mg total) by mouth 2 (two) times daily.   Refills: 0     Ferrous Sulfate 325 (65 Fe) MG Tabs      Take 1 tablet (325 mg total) by mouth daily with breakfast.   Refills: 0     losartan 50 MG Tabs  Commonly known as: Cozaar      Take 1 tablet (50 mg total) by mouth 2 (two) times daily.   Refills: 0     metFORMIN 500 MG Tabs  Commonly known as: Glucophage      Take 1 tablet (500 mg total) by mouth daily with breakfast.   Refills: 0     pantoprazole 40 MG Tbec  Commonly known as: Protonix      Take 1 tablet (40 mg total) by mouth every morning.   Refills: 0     rosuvastatin 10 MG Tabs  Commonly known as: Crestor      Take 1 tablet (10 mg total) by mouth nightly.   Refills: 0     tamsulosin 0.4 MG Caps  Commonly known as: Flomax      Take 1 capsule (0.4 mg total) by mouth nightly.   Refills: 0            STOP taking these medications      amLODIPine 10 MG Tabs  Commonly known as: Norvasc                  Where to Get Your Medications        These medications were sent to [id number] - [place], [place]  [facility] AT [facility] [address], [phone], [phone]  [facility] [address], [facility] [address]      Phone: [phone]   dilTIAZem  MG Cp24  hydrALAZINE 50 MG Tabs         [place]: [place]    Follow-up appointment:   Sushant Fishman MD  [address] [place] [id number] [phone]    Follow up in 1 week(s)      Appointments for Next 30 Days 10/18/2024 - 2024      None        -----------------------------------------------------------------------------------------------  PATIENT DISCHARGE INSTRUCTIONS: See electronic chart    Gualberto Braga DO    Total time spent on discharge plannin minutes     The  Cent[employer] Cures Act makes medical notes like these available to patients in the interest of transparency. Please be advised this is a medical  document. Medical documents are intended to carry relevant information, facts as evident, and the clinical opinion of the practitioner. The medical note is intended as peer to peer communication and may appear blunt or direct. It is written in medical language and may contain abbreviations or verbiage that are unfamiliar.       Electronically signed by Gualberto Braga DO on 10/20/2024 11:37 AM         REVIEWER COMMENTS

## 2024-10-22 ENCOUNTER — ANESTHESIA EVENT (OUTPATIENT)
Dept: SURGERY | Facility: HOSPITAL | Age: 84
End: 2024-10-22
Payer: MEDICARE

## 2024-10-22 NOTE — PAT NURSING NOTE
Chart reviewed by anesthesiologist, Dianelys Lance MD for abnormal EKG.  Received an order for cardiac clearance.  Faxed this request to the surgeon and PCP. Received fax confirmation. Telephoned PCP/surgeon's office and spoke to Trang and informed of above and requested that the clearance be faxed to the PAT department ASAP.    Per Jessica, they are working on obtaining cardiac clearance with cardiology group who followed patient during recent hospital admission.

## 2024-10-23 ENCOUNTER — TELEPHONE (OUTPATIENT)
Dept: SURGERY | Facility: CLINIC | Age: 84
End: 2024-10-23

## 2024-10-23 NOTE — TELEPHONE ENCOUNTER
Called Pre-Admissions - [facility]  (Received faxed EKG results and faxed 10/21/2024 lab results given to Dr. Gamez to review.).  Pre-Admissions requesting results fax to fax# [phone] for anesthesiology to review (Procedure scheduled 10/24/2024)  FAXED.

## 2024-10-23 NOTE — TELEPHONE ENCOUNTER
Received incoming call from PCP Office ([place]) [phone]  Dr. Nick Marino requesting to speak to Dr. Gamez directly ASAP re: patient abnormal labs 10/21/2024 and procedure scheduled for tomorrow.  Dr. Marino requesting Dr. Gamez to call him directly ASA at phone number  [phone].  Message forwarded to Dr. Gamez

## 2024-10-23 NOTE — TELEPHONE ENCOUNTER
S/w pulmonology and cardiology. He is cleared from both standpoints for surgery tomorrow. ASA 81mg has been held (last dose Sunday) - confirmed OK with [facility].    Please notify daughter.

## 2024-10-23 NOTE — TELEPHONE ENCOUNTER
Per preadmission department requesting call back after Dr. Gamez speaks with PCP, unaware of any labs, wants to know if surgery will continue tomorrow. Please call thank you.

## 2024-10-23 NOTE — PAT NURSING NOTE
Called ,pcp office to request any documentation of cardiac clearance or medical clearance for this patient.Received call from ,pcp stating that he saw patient on 10/21/2024 and repeated testing and creatinine results were significantly elevated. He stated he would be contacting ,surgeon to discuss.  I called  and spoke to medical assistant and she confirmed that  left message for  to call him back.. We have not received new test results and I requested that RN call back with update on whether patient will proceed with surgery

## 2024-10-23 NOTE — PAT NURSING NOTE
,pcp repeated chemistry lab 10/21/2024 with new elevation in BUN and Creatinine, ,surgeon saw results,sent to anesthesia for review

## 2024-10-23 NOTE — TELEPHONE ENCOUNTER
Called pt's daughter to discuss below.  Verbalized understanding.   This encounter is completed.

## 2024-10-24 ENCOUNTER — ANESTHESIA (OUTPATIENT)
Dept: SURGERY | Facility: HOSPITAL | Age: 84
End: 2024-10-24
Payer: MEDICARE

## 2024-10-24 ENCOUNTER — APPOINTMENT (OUTPATIENT)
Dept: GENERAL RADIOLOGY | Facility: HOSPITAL | Age: 84
End: 2024-10-24
Attending: UROLOGY
Payer: MEDICARE

## 2024-10-24 ENCOUNTER — HOSPITAL ENCOUNTER (OUTPATIENT)
Facility: HOSPITAL | Age: 84
Setting detail: HOSPITAL OUTPATIENT SURGERY
Discharge: HOME | End: 2024-10-24
Attending: UROLOGY | Admitting: UROLOGY
Payer: MEDICARE

## 2024-10-24 VITALS
SYSTOLIC BLOOD PRESSURE: 141 MMHG | OXYGEN SATURATION: 96 % | RESPIRATION RATE: 16 BRPM | DIASTOLIC BLOOD PRESSURE: 65 MMHG | BODY MASS INDEX: 22.71 KG/M2 | HEART RATE: 57 BPM | TEMPERATURE: 97 F | WEIGHT: 141.31 LBS | HEIGHT: 66 IN

## 2024-10-24 DIAGNOSIS — N39.0 URINARY TRACT INFECTION WITHOUT HEMATURIA, SITE UNSPECIFIED: Primary | ICD-10-CM

## 2024-10-24 LAB
GLUCOSE BLD-MCNC: 148 MG/DL (ref 70–99)
GLUCOSE BLD-MCNC: 149 MG/DL (ref 70–99)

## 2024-10-24 PROCEDURE — 0TJB8ZZ INSPECTION OF BLADDER, VIA NATURAL OR ARTIFICIAL OPENING ENDOSCOPIC: ICD-10-PCS | Performed by: UROLOGY

## 2024-10-24 PROCEDURE — 0T9B80Z DRAINAGE OF BLADDER WITH DRAINAGE DEVICE, VIA NATURAL OR ARTIFICIAL OPENING ENDOSCOPIC: ICD-10-PCS | Performed by: UROLOGY

## 2024-10-24 PROCEDURE — 51040 INCISE & DRAIN BLADDER: CPT | Performed by: UROLOGY

## 2024-10-24 RX ORDER — CIPROFLOXACIN 500 MG/1
500 TABLET, FILM COATED ORAL 2 TIMES DAILY
Qty: 10 TABLET | Refills: 0 | Status: SHIPPED | OUTPATIENT
Start: 2024-10-24 | End: 2024-10-29

## 2024-10-24 RX ORDER — LIDOCAINE HYDROCHLORIDE 10 MG/ML
INJECTION, SOLUTION INFILTRATION; PERINEURAL AS NEEDED
Status: DISCONTINUED | OUTPATIENT
Start: 2024-10-24 | End: 2024-10-24 | Stop reason: HOSPADM

## 2024-10-24 RX ORDER — NICOTINE POLACRILEX 4 MG
15 LOZENGE BUCCAL
Status: DISCONTINUED | OUTPATIENT
Start: 2024-10-24 | End: 2024-10-24 | Stop reason: HOSPADM

## 2024-10-24 RX ORDER — HYDROMORPHONE HYDROCHLORIDE 1 MG/ML
0.4 INJECTION, SOLUTION INTRAMUSCULAR; INTRAVENOUS; SUBCUTANEOUS EVERY 5 MIN PRN
Status: DISCONTINUED | OUTPATIENT
Start: 2024-10-24 | End: 2024-10-24

## 2024-10-24 RX ORDER — HYDROMORPHONE HYDROCHLORIDE 1 MG/ML
0.2 INJECTION, SOLUTION INTRAMUSCULAR; INTRAVENOUS; SUBCUTANEOUS EVERY 5 MIN PRN
Status: DISCONTINUED | OUTPATIENT
Start: 2024-10-24 | End: 2024-10-24

## 2024-10-24 RX ORDER — ALBUTEROL SULFATE 0.83 MG/ML
2.5 SOLUTION RESPIRATORY (INHALATION) AS NEEDED
Status: DISCONTINUED | OUTPATIENT
Start: 2024-10-24 | End: 2024-10-24

## 2024-10-24 RX ORDER — DEXTROSE MONOHYDRATE 25 G/50ML
50 INJECTION, SOLUTION INTRAVENOUS
Status: DISCONTINUED | OUTPATIENT
Start: 2024-10-24 | End: 2024-10-24 | Stop reason: HOSPADM

## 2024-10-24 RX ORDER — LEVOFLOXACIN 5 MG/ML
500 INJECTION, SOLUTION INTRAVENOUS ONCE
Status: COMPLETED | OUTPATIENT
Start: 2024-10-24 | End: 2024-10-24

## 2024-10-24 RX ORDER — MIDAZOLAM HYDROCHLORIDE 1 MG/ML
INJECTION INTRAMUSCULAR; INTRAVENOUS AS NEEDED
Status: DISCONTINUED | OUTPATIENT
Start: 2024-10-24 | End: 2024-10-24 | Stop reason: SURG

## 2024-10-24 RX ORDER — HYDROMORPHONE HYDROCHLORIDE 1 MG/ML
0.6 INJECTION, SOLUTION INTRAMUSCULAR; INTRAVENOUS; SUBCUTANEOUS EVERY 5 MIN PRN
Status: DISCONTINUED | OUTPATIENT
Start: 2024-10-24 | End: 2024-10-24

## 2024-10-24 RX ORDER — METOCLOPRAMIDE HYDROCHLORIDE 5 MG/ML
5 INJECTION INTRAMUSCULAR; INTRAVENOUS EVERY 8 HOURS PRN
Status: DISCONTINUED | OUTPATIENT
Start: 2024-10-24 | End: 2024-10-24

## 2024-10-24 RX ORDER — ACETAMINOPHEN 500 MG
1000 TABLET ORAL ONCE
Status: DISCONTINUED | OUTPATIENT
Start: 2024-10-24 | End: 2024-10-24 | Stop reason: HOSPADM

## 2024-10-24 RX ORDER — ACETAMINOPHEN 500 MG
1000 TABLET ORAL ONCE AS NEEDED
Status: DISCONTINUED | OUTPATIENT
Start: 2024-10-24 | End: 2024-10-24

## 2024-10-24 RX ORDER — HYDROCODONE BITARTRATE AND ACETAMINOPHEN 5; 325 MG/1; MG/1
1 TABLET ORAL ONCE AS NEEDED
Status: DISCONTINUED | OUTPATIENT
Start: 2024-10-24 | End: 2024-10-24

## 2024-10-24 RX ORDER — DOCUSATE SODIUM 100 MG/1
100 CAPSULE, LIQUID FILLED ORAL 2 TIMES DAILY
Qty: 28 CAPSULE | Refills: 0 | Status: SHIPPED | OUTPATIENT
Start: 2024-10-24 | End: 2024-11-07

## 2024-10-24 RX ORDER — DIPHENHYDRAMINE HYDROCHLORIDE 50 MG/ML
12.5 INJECTION INTRAMUSCULAR; INTRAVENOUS AS NEEDED
Status: DISCONTINUED | OUTPATIENT
Start: 2024-10-24 | End: 2024-10-24

## 2024-10-24 RX ORDER — HYDROCODONE BITARTRATE AND ACETAMINOPHEN 5; 325 MG/1; MG/1
2 TABLET ORAL ONCE AS NEEDED
Status: DISCONTINUED | OUTPATIENT
Start: 2024-10-24 | End: 2024-10-24

## 2024-10-24 RX ORDER — LIDOCAINE HYDROCHLORIDE 20 MG/ML
JELLY TOPICAL AS NEEDED
Status: DISCONTINUED | OUTPATIENT
Start: 2024-10-24 | End: 2024-10-24 | Stop reason: HOSPADM

## 2024-10-24 RX ORDER — INSULIN ASPART 100 [IU]/ML
INJECTION, SOLUTION INTRAVENOUS; SUBCUTANEOUS ONCE
Status: COMPLETED | OUTPATIENT
Start: 2024-10-24 | End: 2024-10-24

## 2024-10-24 RX ORDER — ONDANSETRON 2 MG/ML
4 INJECTION INTRAMUSCULAR; INTRAVENOUS EVERY 6 HOURS PRN
Status: DISCONTINUED | OUTPATIENT
Start: 2024-10-24 | End: 2024-10-24

## 2024-10-24 RX ORDER — SODIUM CHLORIDE, SODIUM LACTATE, POTASSIUM CHLORIDE, CALCIUM CHLORIDE 600; 310; 30; 20 MG/100ML; MG/100ML; MG/100ML; MG/100ML
INJECTION, SOLUTION INTRAVENOUS CONTINUOUS
Status: DISCONTINUED | OUTPATIENT
Start: 2024-10-24 | End: 2024-10-24

## 2024-10-24 RX ORDER — HYDROCODONE BITARTRATE AND ACETAMINOPHEN 5; 325 MG/1; MG/1
1 TABLET ORAL EVERY 6 HOURS PRN
Qty: 30 TABLET | Refills: 0 | Status: SHIPPED | OUTPATIENT
Start: 2024-10-24

## 2024-10-24 RX ORDER — NALOXONE HYDROCHLORIDE 0.4 MG/ML
0.08 INJECTION, SOLUTION INTRAMUSCULAR; INTRAVENOUS; SUBCUTANEOUS AS NEEDED
Status: DISCONTINUED | OUTPATIENT
Start: 2024-10-24 | End: 2024-10-24

## 2024-10-24 RX ORDER — EPHEDRINE SULFATE 50 MG/ML
INJECTION INTRAVENOUS AS NEEDED
Status: DISCONTINUED | OUTPATIENT
Start: 2024-10-24 | End: 2024-10-24 | Stop reason: SURG

## 2024-10-24 RX ORDER — NICOTINE POLACRILEX 4 MG
30 LOZENGE BUCCAL
Status: DISCONTINUED | OUTPATIENT
Start: 2024-10-24 | End: 2024-10-24 | Stop reason: HOSPADM

## 2024-10-24 RX ORDER — LIDOCAINE HYDROCHLORIDE 10 MG/ML
INJECTION, SOLUTION EPIDURAL; INFILTRATION; INTRACAUDAL; PERINEURAL AS NEEDED
Status: DISCONTINUED | OUTPATIENT
Start: 2024-10-24 | End: 2024-10-24 | Stop reason: SURG

## 2024-10-24 RX ORDER — BUPIVACAINE HYDROCHLORIDE 5 MG/ML
INJECTION, SOLUTION EPIDURAL; INTRACAUDAL AS NEEDED
Status: DISCONTINUED | OUTPATIENT
Start: 2024-10-24 | End: 2024-10-24 | Stop reason: HOSPADM

## 2024-10-24 RX ORDER — INSULIN ASPART 100 [IU]/ML
INJECTION, SOLUTION INTRAVENOUS; SUBCUTANEOUS
Status: COMPLETED
Start: 2024-10-24 | End: 2024-10-24

## 2024-10-24 RX ADMIN — EPHEDRINE SULFATE 5 MG: 50 INJECTION INTRAVENOUS at 14:12:00

## 2024-10-24 RX ADMIN — EPHEDRINE SULFATE 5 MG: 50 INJECTION INTRAVENOUS at 14:14:00

## 2024-10-24 RX ADMIN — MIDAZOLAM HYDROCHLORIDE 1 MG: 1 INJECTION INTRAMUSCULAR; INTRAVENOUS at 13:40:00

## 2024-10-24 RX ADMIN — EPHEDRINE SULFATE 5 MG: 50 INJECTION INTRAVENOUS at 14:03:00

## 2024-10-24 RX ADMIN — SODIUM CHLORIDE, SODIUM LACTATE, POTASSIUM CHLORIDE, CALCIUM CHLORIDE: 600; 310; 30; 20 INJECTION, SOLUTION INTRAVENOUS at 13:31:00

## 2024-10-24 RX ADMIN — LEVOFLOXACIN 500 MG: 5 INJECTION, SOLUTION INTRAVENOUS at 13:44:00

## 2024-10-24 RX ADMIN — LIDOCAINE HYDROCHLORIDE 50 MG: 10 INJECTION, SOLUTION EPIDURAL; INFILTRATION; INTRACAUDAL; PERINEURAL at 13:37:00

## 2024-10-24 NOTE — CDS QUERY
DOCUMENTATION CLARIFICATION QUERY    Dear Dr. Braga,  Please clarify the diagnoses of Hypoxia and Acute Hypoxic respiratory failure    [   ] Acute hypoxia, without respiratory failure   [   ] Acute hypoxic respiratory failure -confirmed   [   ] Other (please specify) ___________  ________________________________________________________________________  CLINICAL INDICATORS:   10/15/24 ED: Patient was hypoxic down to 89% placed on oxygen.   Clinical impression: Hypoxia  10/18/24 Pulmonologist: Assessment: Acute hypoxic respiratory failure    CT scan was reviewed, no evidence of pneumonia but does have moderate emphysema, no wheezing on exam to suggest COPD exacerbation    10/18 D/C Summary: Discharge Diagnosis:    Chest pain  Pericardial effusion  Hypoxia    Brief synopsis: #Hypoxia -Improved  -?Secondary to emphysema - patient quit smoking 40 years ago  -CTA chest without PE/effusion/PNA    RISK FACTORS: History of smoking   TREATMENT: Pulmonology consult - Supplemental oxygen up to 4L NC - Nebs/inhalers - CTA chest - IV Lasix for elev BNP           Use of terms such as suspected, possible, or probable (associated with a specific diagnosis that is being evaluated, monitored, or treated as if it exists) are acceptable and can be coded in the inpatient setting, when documented at the time of discharge.     Nitza Wynne RN, BSN, CWOCN [facility]  [phone]   THIS FORM IS A PERMANENT PART OF THE MEDICAL RECORD

## 2024-10-24 NOTE — OPERATIVE REPORT
Urology Operative Note    Attending Surgeon: Rikki Gamez MD    Patient Name: Lawyer Vira Mccarty    Date of Surgery: 10/24/24    Preoperative Diagnosis: urinary retention     Postoperative Diagnosis: same     Procedure Performed: Cystoscopy, insertion of suprapubic catheter    Indication for procedure:  Patient is a 84 year old male with a history of urinary retention with indwelling moctezuma. The patient was counseled on options and elected to undergo the aforementioned procedure. We discussed the risks and benefits to surgery. We discussed risks including, but not limited to, bleeding, infection, possible damage to surrounding structures, possible worsening of urinary sepsis. The patient understood these risks and wished to proceed with surgery.    Description of the procedure:  The patient was taken to the operating room and prepped and draped in lithotomy position after undergoing sedation.     The cystoscope was inserted and the bladder was entered. The bladder was filled with ~ 500 mL sterile saline. The bladder dome was easily palpable from the suprapubic skin. I inserted an 18 gauge needle in the suprapubic skin and injected marcaine. I was able to easily pass the needle into the bladder dome. Using the needle as a guide I then made a small incision in the skin and inserted the non-disposable 16F suprapubic trocar from the suprapubic skin into the bladder under direct vision. I inflated the balloon with 15 mL sterile water. The balloon was seated at the bladder dome and moctezuma catheter was secured in place using a 2-0 silk.  The bladder was carefully inspected. No significant oozing was noted at the end of the case. Urine was clear. The bladder was drained and the scope was removed.    The patient was awoken having tolerated the procedure well.    Specimen: none    Complications: No known complications    Condition on Discharge from the operating room was stable    Plan: The pt will f/u with his primary  urologist in ~ 5 weeks for SP catheter exchange.    Rikki Gamez MD  Date: 10/24/2024  Time: 2:22 PM

## 2024-10-24 NOTE — ANESTHESIA PREPROCEDURE EVALUATION
PRE-OP EVALUATION    Patient Name: Lawyer Vira Mccarty    Admit Diagnosis: Urinary retention [R33.9]    Pre-op Diagnosis: Urinary retention [R33.9]    CYSTOSCOPY, PERCUTANEOUS INSERTION SUPRA-PUBIC CATHETER    Anesthesia Procedure: CYSTOSCOPY, PERCUTANEOUS INSERTION SUPRA-PUBIC CATHETER    Surgeons and Role:     * Rikki Gamez MD - Primary    Pre-op vitals reviewed.  Temp: 97.7 °F (36.5 °C)  Pulse: 59  Resp: 18  BP: 132/62  SpO2: 99 %  Body mass index is 22.81 kg/m².    Current medications reviewed.  Hospital Medications:   acetaminophen (Tylenol Extra Strength) tab 1,000 mg  1,000 mg Oral Once    glucose (Dex4) 15 GM/59ML oral liquid 15 g  15 g Oral Q15 Min PRN    Or    glucose (Glutose) 40% oral gel 15 g  15 g Oral Q15 Min PRN    Or    glucose-vitamin C (Dex-4) chewable tab 4 tablet  4 tablet Oral Q15 Min PRN    Or    dextrose 50% injection 50 mL  50 mL Intravenous Q15 Min PRN    Or    glucose (Dex4) 15 GM/59ML oral liquid 30 g  30 g Oral Q15 Min PRN    Or    glucose (Glutose) 40% oral gel 30 g  30 g Oral Q15 Min PRN    Or    glucose-vitamin C (Dex-4) chewable tab 8 tablet  8 tablet Oral Q15 Min PRN    lactated ringers infusion   Intravenous Continuous    levoFLOXacin in dextrose 5% (Levaquin) 500 mg/100mL IVPB premix 500 mg  500 mg Intravenous Once    [COMPLETED] potassium chloride (Klor-Con M20) tab 40 mEq  40 mEq Oral Q4H    [COMPLETED] hydrALAZINE (Apresoline) tab 25 mg  25 mg Oral Once    [COMPLETED] furosemide (Lasix) 10 mg/mL injection 20 mg  20 mg Intravenous Once    [COMPLETED] ipratropium-albuterol (Duoneb) 0.5-2.5 (3) MG/3ML inhalation solution           Outpatient Medications:   Prescriptions Prior to Admission[1]    Allergies: Patient has no known allergies.      Anesthesia Evaluation        Anesthetic Complications           GI/Hepatic/Renal      (+) GERD                           Cardiovascular      ECG reviewed.  Exercise tolerance: good     MET: >4      (+) hypertension   (+) hyperlipidemia  (+)  CAD        (+) valvular problems/murmurs and MS       (+) CHF                Endo/Other      (+) diabetes and well controlled, type 2, not using insulin                         Pulmonary        (+) COPD and mild                  Neuro/Psych                              10/18/2024 ECHO findings:  Conclusions:     1. Left ventricle: The cavity size was normal. Wall thickness was mildly increased. Systolic function was normal. The estimated ejection fraction was 65-70%, by visual assessment. No diagnostic evidence for regional wall motion abnormalities. Doppler parameters are consistent with abnormal left ventricular relaxation - grade 1 diastolic dysfunction.   2. Left atrium: The atrium was moderately dilated.   3. Aortic root: The aortic root was 3.7cm diameter.   4. Ascending aorta: The ascending aorta was 3.5cm diameter.   5. Mitral valve: The annulus was moderately calcified. The leaflets were normal thickness. The mean diastolic gradient was 10mm Hg at a HR of 81bpm.   6. Pericardium, extracardiac: A moderate pericardial effusion was   identified. There was no evidence of hemodynamic compromise.   Impressions:  This study is compared with previous dated 10-15-24:   Pericardial effusion is largely unchanged.           Past Surgical History:   Procedure Laterality Date    Amputation metatarsal+toe,single Left     LEFT GREAT TOE     Social History     Socioeconomic History    Marital status: Single   Tobacco Use    Smoking status: Former     Types: Cigarettes    Smokeless tobacco: Never   Vaping Use    Vaping status: Never Used   Substance and Sexual Activity    Alcohol use: Not Currently    Drug use: Not Currently   Other Topics Concern    Caffeine Concern Yes     Comment: coffee occ     Exercise No     History   Drug Use Unknown     Available pre-op labs reviewed.  Lab Results   Component Value Date    WBC 7.8 10/18/2024    RBC 3.98 10/18/2024    HGB 11.5 (L) 10/18/2024    HCT 32.4 (L) 10/18/2024    MCV 81.4  10/18/2024    MCH 28.9 10/18/2024    MCHC 35.5 10/18/2024    RDW 15.2 10/18/2024    .0 10/18/2024     Lab Results   Component Value Date     10/18/2024    K 4.0 10/18/2024     10/18/2024    CO2 28.0 10/18/2024    BUN 19 10/18/2024    CREATSERUM 1.28 10/18/2024     (H) 10/18/2024    CA 10.0 10/18/2024     Lab Results   Component Value Date    INR 1.21 (H) 10/15/2024         Airway      Mallampati: III  Mouth opening: >3 FB  TM distance: 4 - 6 cm  Neck ROM: full Cardiovascular    Cardiovascular exam normal.         Dental    Dentition appears grossly intact         Pulmonary    Pulmonary exam normal.                 Other findings  Edentulous.            ASA: 3   Plan: MAC and general  NPO status verified and patient meets guidelines.          Plan/risks discussed with: patient                Present on Admission:  **None**             [1]   Medications Prior to Admission   Medication Sig Dispense Refill Last Dose/Taking    hydrALAZINE 50 MG Oral Tab Take 1 tablet (50 mg total) by mouth 2 (two) times daily. 60 tablet 0 10/24/2024 at  7:00 AM    dilTIAZem  MG Oral Capsule SR 24 Hr Take 1 capsule (120 mg total) by mouth daily. 90 capsule 0 10/24/2024 at  7:00 AM    budeson-glycopyrrol-formoterol 160-9-4.8 MCG/ACT Inhalation Aerosol Inhale 2 puffs into the lungs 2 (two) times daily. 1 each 2 10/23/2024 at  9:00 PM    losartan 50 MG Oral Tab Take 1 tablet (50 mg total) by mouth 2 (two) times daily.   10/23/2024 at  9:00 PM    pantoprazole 40 MG Oral Tab EC Take 1 tablet (40 mg total) by mouth every morning.   10/24/2024 at  7:00 AM    Cholecalciferol 50 MCG (2000 UT) Oral Tab Take 1 tablet (2,000 Units total) by mouth daily.   10/24/2024 at  7:00 AM    Ferrous Sulfate 325 (65 Fe) MG Oral Tab Take 1 tablet (325 mg total) by mouth daily with breakfast.   10/24/2024 at  7:00 AM    metFORMIN 500 MG Oral Tab Take 1 tablet (500 mg total) by mouth daily with breakfast.   10/23/2024 at  7:30 AM     aspirin 81 MG Oral Tab EC Take 1 tablet (81 mg total) by mouth daily.   10/20/2024    carvedilol 25 MG Oral Tab Take 1 tablet (25 mg total) by mouth 2 (two) times daily.   10/24/2024 at  7:00 AM    cilostazol 100 MG Oral Tab Take 1 tablet (100 mg total) by mouth 2 (two) times daily.   10/24/2024 at  7:00 AM    rosuvastatin 10 MG Oral Tab Take 1 tablet (10 mg total) by mouth nightly.   10/23/2024 at  9:00 PM    tamsulosin 0.4 MG Oral Cap Take 1 capsule (0.4 mg total) by mouth nightly.   10/23/2024 at  9:00 PM

## 2024-10-24 NOTE — ANESTHESIA POSTPROCEDURE EVALUATION
[facility] [place]    Lawyer Vira Mccarty Patient Status:  Hospital Outpatient Surgery   Age/Gender 84 year old male MRN [id number]   Location [facility] POST ANESTHESIA CARE UNIT Attending Rikki Gamez MD   Hosp Day # 0 PCP Laura Marino MD       Anesthesia Post-op Note    CYSTOSCOPY, PERCUTANEOUS INSERTION SUPRA-PUBIC CATHETER    Procedure Summary       Date: 10/24/24 Room / Location:  MAIN OR 07 / [facility] MAIN OR    Anesthesia Start: 1331 Anesthesia Stop: 1424    Procedure: CYSTOSCOPY, PERCUTANEOUS INSERTION SUPRA-PUBIC CATHETER (Abdomen) Diagnosis:       Urinary retention      (Urinary retention [R33.9])    Surgeons: Rikki Gamez MD Anesthesiologist: Julian Arreola MD    Anesthesia Type: MAC ASA Status: 3            Anesthesia Type: MAC    Vitals Value Taken Time   /62 10/24/24 1427   Temp 97.1 °F (36.2 °C) 10/24/24 1427   Pulse 61 10/24/24 1427   Resp 18 10/24/24 1427   SpO2 98 % 10/24/24 1427       Patient Location: PACU    Anesthesia Type: MAC    Airway Patency: patent    Postop Pain Control: adequate    Mental Status: mildly sedated but able to meaningfully participate in the post-anesthesia evaluation    Nausea/Vomiting: none    Cardiopulmonary/Hydration status: stable euvolemic    Complications: no apparent anesthesia related complications    Postop vital signs: stable    Comments: report given to RN. Pt breathing comfortably, VSS. Following commands.     Dental Exam: Unchanged from Preop    Patient to be discharged from PACU when criteria met.

## 2024-10-24 NOTE — DISCHARGE INSTRUCTIONS
Discharge Instructions: Caring for Your Suprapubic Catheter   You are going home with a suprapubic catheter in place. This tube is placed directly into the bladder through your belly (abdomen) to drain urine from your bladder. You were shown how to care for your catheter in the hospital. This sheet will help remind you of those steps and guidelines when you are at home.   Home care  Shower as needed.   Change your dressing every day. Change the dressing more often if it falls off, becomes dirty, or has absorbed a lot of drainage.  Gather your supplies  Tape  Soap  Wash cloth  Wastebasket and plastic bag  Dressing sponges (4\" x 4\") that are cut or split FCI into the middle  Remove the dressing and check for problems   Wash your hands thoroughly before and after all catheter care.  Gently remove the old dressing if you have one.  Don’t pull on the tube.  Check the dressing for drainage. Notice whether anything looks abnormal or smells bad.  Place your dressing in the plastic bag and throw it away in the wastebasket.  Now look at the place where the catheter leaves your body (exit site).  Note any swelling, bleeding, irritation, abnormal, or smelly drainage.  Also check for any sores next to the exit site. Sores form around the exit site if there is too much pressure from the tube on the skin.  Clean the area  Wash the area around the catheter exit site gently with soap and water.  Gently pat the area dry.  Don't use powders, creams, or sprays near the exit site.  Place a split 4\" x 4\" sponge around the catheter. Tape it in place.    Follow-up care  Make a follow-up appointment, or as advised.   When to call your healthcare provider  Call your healthcare provider right away if you have any of the following:   Catheter that falls out, or is clogged or feels clogged  Stitches that fall out  Urine leaking around catheter  Urine that is cloudy, bloody, or smells bad  No urine drainage  Bladder that feels full or  painful  Rash, itching, redness, swelling, or drainage at the catheter site  Fever of 100.4°F ( 38°C ) or higher, or as directed by your provider  Art Gonzalez last reviewed this educational content on 9/1/2022 © 2000-2023 [employer]. All rights reserved. This information is not intended as a substitute for professional medical care. Always follow your healthcare professional's instructions.      You have been given a prescription for Norco 5/325  Norco was Given to you at:  Next dose due:    Take this medication as directed  This medication contains Tylenol (acetaminophen)  Do not take additional Tylenol while taking Norco    Norco is a Narcotic and can be constipating or upset your stomach  Don't take Norco on an empty stomach  Drink plenty of water  Alcoholic beverages should be avoided while taking narcotics

## 2024-10-25 ENCOUNTER — TELEPHONE (OUTPATIENT)
Dept: SURGERY | Facility: CLINIC | Age: 84
End: 2024-10-25

## 2024-10-25 NOTE — TELEPHONE ENCOUNTER
Patients daughter Octavio calling to ask when patient can take a shower post surgery. Patients daughter informed office is not closed. Please update through patients [email or web address] on Monday, thanks.   *Also, is it ok to have area wet   Initial (On Arrival)

## 2024-10-25 NOTE — INTERVAL H&P NOTE
Pre-op Diagnosis: Urinary retention [R33.9]    The above referenced H&P was reviewed by Rikki Gamez MD on 10/25/2024, the patient was examined and no significant changes have occurred in the patient's condition since the H&P was performed.  I discussed with the patient and/or legal representative the potential benefits, risks and side effects of this procedure; the likelihood of the patient achieving goals; and potential problems that might occur during recuperation.  I discussed reasonable alternatives to the procedure, including risks, benefits and side effects related to the alternatives and risks related to not receiving this procedure.  We will proceed with procedure as planned.

## 2024-10-27 ENCOUNTER — PATIENT MESSAGE (OUTPATIENT)
Dept: SURGERY | Facility: CLINIC | Age: 84
End: 2024-10-27

## 2024-10-29 ENCOUNTER — TELEPHONE (OUTPATIENT)
Dept: SURGERY | Facility: CLINIC | Age: 84
End: 2024-10-29

## 2024-10-29 NOTE — TELEPHONE ENCOUNTER
My chart message:  Hello, following up on the attached questions.  Thank you        Lawyer Burgessreginald JAFFE [facility] Clinical Staff (supporting Rikki Gamez MD)2 days ago     EVERT Gamez, thank you for taking care of my Dad Lawyer Constantino. I just have a few follow up questions:      When can he take a shower .   When taking a shower us if okay to get the area wet  When should we resume the daily aspirin   Should baths not occur ever.      Thank you

## 2024-10-29 NOTE — TELEPHONE ENCOUNTER
Patient's daughter calling back to ask when patient can take aspirin and to ask about patient's ability to shower. Patient's daughter also asking if a gauze or patch is meant to be kept over area with tube in stomach after it stops bleeding. Please call.

## 2024-10-30 NOTE — TELEPHONE ENCOUNTER
Called pt's daughter to discuss below, no answer, left message to call back.   [employer] message also sent.

## 2024-11-13 ENCOUNTER — OFFICE VISIT (OUTPATIENT)
Facility: CLINIC | Age: 84
End: 2024-11-13
Payer: MEDICARE

## 2024-11-13 VITALS
HEART RATE: 71 BPM | DIASTOLIC BLOOD PRESSURE: 60 MMHG | TEMPERATURE: 98 F | OXYGEN SATURATION: 96 % | RESPIRATION RATE: 16 BRPM | WEIGHT: 153 LBS | HEIGHT: 66 IN | SYSTOLIC BLOOD PRESSURE: 140 MMHG | BODY MASS INDEX: 24.59 KG/M2

## 2024-11-13 DIAGNOSIS — J44.9 CHRONIC OBSTRUCTIVE PULMONARY DISEASE, UNSPECIFIED COPD TYPE (HCC): Primary | ICD-10-CM

## 2024-11-13 DIAGNOSIS — Z93.59 SUPRAPUBIC CATHETER (HCC): ICD-10-CM

## 2024-11-13 DIAGNOSIS — N18.32 TYPE 2 DIABETES MELLITUS WITH STAGE 3B CHRONIC KIDNEY DISEASE, WITHOUT LONG-TERM CURRENT USE OF INSULIN (HCC): ICD-10-CM

## 2024-11-13 DIAGNOSIS — F01.518 VASCULAR DEMENTIA WITH BEHAVIORAL DISTURBANCE (HCC): ICD-10-CM

## 2024-11-13 DIAGNOSIS — N18.4 STAGE 4 CHRONIC KIDNEY DISEASE (HCC): ICD-10-CM

## 2024-11-13 DIAGNOSIS — I77.9 PERIPHERAL ARTERIAL OCCLUSIVE DISEASE (HCC): ICD-10-CM

## 2024-11-13 DIAGNOSIS — E11.22 TYPE 2 DIABETES MELLITUS WITH STAGE 3B CHRONIC KIDNEY DISEASE, WITHOUT LONG-TERM CURRENT USE OF INSULIN (HCC): ICD-10-CM

## 2024-11-13 DIAGNOSIS — Z89.412 STATUS POST AMPUTATION OF LEFT GREAT TOE (HCC): ICD-10-CM

## 2024-11-13 DIAGNOSIS — F01.B0 MODERATE VASCULAR DEMENTIA WITHOUT BEHAVIORAL DISTURBANCE, PSYCHOTIC DISTURBANCE, MOOD DISTURBANCE, OR ANXIETY (HCC): ICD-10-CM

## 2024-11-13 DIAGNOSIS — R09.02 HYPOXIA: ICD-10-CM

## 2024-11-13 DIAGNOSIS — N17.9 AKI (ACUTE KIDNEY INJURY) (HCC): ICD-10-CM

## 2024-11-13 PROCEDURE — 1159F MED LIST DOCD IN RCRD: CPT | Performed by: INTERNAL MEDICINE

## 2024-11-13 PROCEDURE — 1160F RVW MEDS BY RX/DR IN RCRD: CPT | Performed by: INTERNAL MEDICINE

## 2024-11-13 PROCEDURE — 1111F DSCHRG MED/CURRENT MED MERGE: CPT | Performed by: INTERNAL MEDICINE

## 2024-11-13 PROCEDURE — 99214 OFFICE O/P EST MOD 30 MIN: CPT | Performed by: INTERNAL MEDICINE

## 2024-11-13 RX ORDER — POTASSIUM CHLORIDE 1500 MG/1
TABLET, EXTENDED RELEASE ORAL
COMMUNITY
Start: 2024-10-23

## 2024-11-13 RX ORDER — ALBUTEROL SULFATE 90 UG/1
2 INHALANT RESPIRATORY (INHALATION) EVERY 6 HOURS PRN
Qty: 1 EACH | Refills: 3 | Status: SHIPPED | OUTPATIENT
Start: 2024-11-13

## 2024-11-13 NOTE — PROGRESS NOTES
Pulmonary/Critical Care/Sleep Medicine    Consult Note     PCP: Luara Marino MD   Phone: [phone]   Fax: [phone]        Chief Complaint   Patient presents with    New Patient     Pt here for hospital follow up.  Pt had sharp pain on left side.  Pt states no issues at this time. XR and CTA chest performed 10/15/24.        HPI  I had the pleasure of seeing Lawyer Vira Mccarty who is a pleasant 84 year old male who presents for evaluation of COPD after discharge form hospital     The patient was admitted to [facility] with Urinary retentiion and acute on chronic renal failure. He required suprapubic catheter placement. During the hospitalization the patient developed acute reparatory Failure  and required 2 LPM/NC oxygen that was weaned off. The patient has history of heavy smoking in past with diagnosis of emphysema so presented for pulmonary evaluation.     He currently lives in [facility] and is visiting his daughter     On today's visit the He denies nasal congestion, cough, sputum production, chest pain, wheezing, shortness of breath, hemoptysis, fever, chills, nausea, vomiting, abdominal pains, focal weakness, weight loss or night sweats. He uses walker to be safe.    The patient states that he sleeps well.    He has no  pets.          Hx of tobacco use: He  reports that he quit smoking about 21 years ago. His smoking use included cigarettes. He started smoking about 70 years ago. He has a 98 pack-year smoking history. He has never used smokeless tobacco.    Past Medical History:    AMS (altered mental status)    Ataxia    Atherosclerosis of coronary artery    CKD (chronic kidney disease), stage III (HCC)    Diabetes (HCC)    Diverticulosis of large intestine    Esophageal reflux    Essential hypertension    High blood pressure    High cholesterol    Hyperlipidemia    Pulmonary emphysema (HCC)    Renal disorder    Visual impairment      Past Surgical History:   Procedure Laterality Date     Amputation metatarsal+toe,single Left     LEFT GREAT TOE    Cataract      Cath, suprapubic/cystoscopic  10/2024    Other surgical history       Allergies[1]  Current Outpatient Medications   Medication Sig Dispense Refill    Potassium Chloride ER 20 MEQ Oral Tab CR TAKE 1 TABLET BY MOUTH DAILY WITH FOOD FOR 3 DAYS      hydrALAZINE 50 MG Oral Tab Take 1 tablet (50 mg total) by mouth 2 (two) times daily. 60 tablet 0    dilTIAZem  MG Oral Capsule SR 24 Hr Take 1 capsule (120 mg total) by mouth daily. 90 capsule 0    budeson-glycopyrrol-formoterol 160-9-4.8 MCG/ACT Inhalation Aerosol Inhale 2 puffs into the lungs 2 (two) times daily. 1 each 2    losartan 50 MG Oral Tab Take 1 tablet (50 mg total) by mouth 2 (two) times daily.      pantoprazole 40 MG Oral Tab EC Take 1 tablet (40 mg total) by mouth every morning.      Cholecalciferol 50 MCG (2000 UT) Oral Tab Take 1 tablet (2,000 Units total) by mouth daily.      Ferrous Sulfate 325 (65 Fe) MG Oral Tab Take 1 tablet (325 mg total) by mouth daily with breakfast.      metFORMIN 500 MG Oral Tab Take 1 tablet (500 mg total) by mouth daily with breakfast.      carvedilol 25 MG Oral Tab Take 1 tablet (25 mg total) by mouth 2 (two) times daily.      cilostazol 100 MG Oral Tab Take 1 tablet (100 mg total) by mouth 2 (two) times daily.      rosuvastatin 10 MG Oral Tab Take 1 tablet (10 mg total) by mouth nightly.      tamsulosin 0.4 MG Oral Cap Take 1 capsule (0.4 mg total) by mouth nightly.      HYDROcodone-acetaminophen (NORCO) 5-325 MG Oral Tab Take 1 tablet by mouth every 6 (six) hours as needed for Pain. (Patient not taking: Reported on 11/13/2024) 30 tablet 0      Social History     Socioeconomic History    Marital status: Single   Occupational History    Occupation: [employer]     Comment: [employer]  exposed to asbestos and fumes   Tobacco Use    Smoking status: Former     Current packs/day: 0.00     Average packs/day: 2.0 packs/day for 49.0 years  (98.0 ttl pk-yrs)     Types: Cigarettes     Start date:      Quit date:      Years since quittin.8    Smokeless tobacco: Never   Vaping Use    Vaping status: Never Used   Substance and Sexual Activity    Alcohol use: Not Currently    Drug use: Not Currently   Other Topics Concern    Caffeine Concern Yes     Comment: coffee occ     Exercise No     Social Drivers of Health     Food Insecurity: No Food Insecurity (10/15/2024)    Food Insecurity     Food Insecurity: Never true   Transportation Needs: No Transportation Needs (10/15/2024)    Transportation Needs     Lack of Transportation: No   Housing Stability: Low Risk  (10/15/2024)    Housing Stability     Housing Instability: No      Immunization History   Administered Date(s) Administered    Covid-19 Vaccine Pfizer 30 mcg/0.3 ml 2021, 2021, 2022   Deferred Date(s) Deferred    High Dose Fluzone Influenza Vaccine, 65yr+ PF 0.5mL (55047) 10/18/2024      Family History   Problem Relation Age of Onset    No Known Problems Father     No Known Problems Mother     No Known Problems Sister     No Known Problems Brother         Review of Systems   Constitutional:  Negative for fatigue, fever and unexpected weight change.   HENT:  Negative for congestion, mouth sores, nosebleeds, postnasal drip, rhinorrhea, sore throat and trouble swallowing.    Eyes:  Negative for visual disturbance.   Respiratory:  Negative for apnea, cough, choking, chest tightness, shortness of breath and wheezing.    Cardiovascular:  Negative for chest pain, palpitations and leg swelling.   Gastrointestinal:  Negative for abdominal pain, constipation, diarrhea, nausea and vomiting.   Genitourinary:  Negative for difficulty urinating.   Musculoskeletal:  Negative for arthralgias, back pain, gait problem and myalgias.   Neurological:  Negative for dizziness, weakness and headaches.        Balance problems    Psychiatric/Behavioral:  Negative for sleep disturbance.          Vitals:    11/13/24 0900   BP: 140/60   Pulse: 71   Resp: 16   Temp: 97.8 °F (36.6 °C)      SpO2: 96 %  Ht Readings from Last 1 Encounters:   11/13/24 5' 6\" (1.676 m)     Wt Readings from Last 1 Encounters:   11/13/24 153 lb (69.4 kg)     Body mass index is 24.69 kg/m².     Physical Exam  Constitutional:       General: He is not in acute distress.     Appearance: Normal appearance. He is not ill-appearing or diaphoretic.   HENT:      Head: Normocephalic and atraumatic.      Nose: Nose normal. No congestion or rhinorrhea.      Comments: Narrow edematous nares bilaterally      Mouth/Throat:      Mouth: Mucous membranes are moist.      Pharynx: Oropharynx is clear. No oropharyngeal exudate or posterior oropharyngeal erythema.      Comments: Mallampati class IV palate   Eyes:      Extraocular Movements: Extraocular movements intact.      Pupils: Pupils are equal, round, and reactive to light.   Cardiovascular:      Rate and Rhythm: Normal rate.      Pulses: Normal pulses.      Heart sounds: Normal heart sounds. No murmur heard.  Pulmonary:      Effort: Pulmonary effort is normal. No respiratory distress.      Breath sounds: Normal breath sounds. No wheezing or rhonchi.   Chest:      Chest wall: No tenderness.   Abdominal:      General: Abdomen is flat. Bowel sounds are normal.      Palpations: Abdomen is soft.   Musculoskeletal:         General: Normal range of motion.   Skin:     General: Skin is warm.   Neurological:      General: No focal deficit present.      Mental Status: He is alert and oriented to person, place, and time.   Psychiatric:         Mood and Affect: Mood normal.         Behavior: Behavior normal.         Thought Content: Thought content normal.         Judgment: Judgment normal.             Labs:  Last BMP  Lab Results   Component Value Date     (H) 10/18/2024    BUN 19 10/18/2024    CREATSERUM 1.28 10/18/2024    ANIONGAP 7 10/18/2024    GFRAA 57 (L) 06/25/2022    GFRNAA 50 (L) 06/25/2022     CA 10.0 10/18/2024     10/18/2024    K 4.0 10/18/2024     10/18/2024    CO2 28.0 10/18/2024    OSMOCALC 295 10/18/2024      Last CBC  Lab Results   Component Value Date    WBC 7.8 10/18/2024    RBC 3.98 10/18/2024    HGB 11.5 (L) 10/18/2024    HCT 32.4 (L) 10/18/2024    MCV 81.4 10/18/2024    MCH 28.9 10/18/2024    MCHC 35.5 10/18/2024    RDW 15.2 10/18/2024    .0 10/18/2024      Last CMP  Lab Results   Component Value Date     (H) 10/18/2024    BUN 19 10/18/2024    CREATSERUM 1.28 10/18/2024    ANIONGAP 7 10/18/2024    GFRNAA 50 (L) 06/25/2022    GFRAA 57 (L) 06/25/2022    CA 10.0 10/18/2024    OSMOCALC 295 10/18/2024    ALKPHO 84 10/15/2024    AST 21 10/15/2024    ALT 13 10/15/2024    BILT 0.8 10/15/2024    TP 8.6 (H) 10/15/2024    ALB 4.9 (H) 10/15/2024    GLOBULIN 3.7 (H) 10/15/2024     10/18/2024    K 4.0 10/18/2024     10/18/2024    CO2 28.0 10/18/2024      Last Thyroid Function  Lab Results   Component Value Date    T4F 0.9 07/08/2022    TSH 3.480 07/08/2022        Imaging:  PROCEDURE:  CTA CHEST + CT ABD (W) + CT PEL (W)  SH(CPT=71275/10132)     COMPARISON:  None.     INDICATIONS:  chest pain started 2 hour ago     TECHNIQUE:  CT of the chest (with CTA imaging), abdomen, and pelvis were obtained post- injection of non-ionic intravenous contrast material. Multi-planar reformatted/3-D images were created to optimize visualization of vascular anatomy.  Dose reduction  techniques were used. Dose information is transmitted to the ACR (American College of Radiology) NRDR (National Radiology Data Registry) which includes the Dose Index Registry.     PATIENT STATED HISTORY:(As transcribed by Technologist)   Patient is here for left sided chest pain      CONTRAST USED:  100cc of Isovue 370     FINDINGS:       CHEST:    Heart size is within normal limits.  There is a moderate-sized pericardial effusion present.  This measures up to approximately 2 cm in diameter along the lateral  wall.     The main pulmonary artery is enlarged which is suggestive of pulmonary hypertension, measuring up to 3.5 cm in diameter.  There is no pulmonary embolism to the first subsegmental arterial level on the basis of this exam.     The central airways appear patent.  Mild to moderate centrilobular emphysematous changes are noted.  Motion artifact limits assessment of the lungs.  Mild basilar atelectasis is present.  There is some mild peribronchial thickening.  Probable areas of  fibrotic change are present although motion does somewhat limit assessment.     No definite enlarged mediastinal or hilar lymph nodes are seen.  There is some fluid present within the pericardial recesses which somewhat limits assessment.     ABDOMEN/PELVIS:     There is some periportal edema within the liver.  This may be related to volume resuscitation.  The spleen, pancreas, adrenal glands and kidneys have an essentially unremarkable postcontrast appearance.  A probable small left renal cyst measures up to 9  mm.  No specific further follow-up is recommended.  Nonobstructing calculi of the right kidney measure in the range of 1-2 mm.     No free fluid is seen within the abdomen or pelvis.  There is prominent circumferential mural thickening of the urinary bladder with a Joe catheter present.  This may represent cystitis.  A neoplastic process cannot be entirely excluded.  Correlation  with direct visualization may be helpful.     No dilated loops of small bowel are seen.  Portions of the bowel are decompressed, limiting evaluation.  No pelvic lymphadenopathy is seen.                     Impression   CONCLUSION:  There is no pulmonary embolism to the first subsegmental arterial level.     There is prominent circumferential mural thickening of the urinary bladder which may represent cystitis.  A neoplastic process cannot be entirely excluded.  Clinical correlation and/or correlation with direct visualization is recommended.      Moderate pericardial effusion is present.  Consider correlation with echocardiogram.     Nonobstructing right renal calculi are present.              LOCATION:  [facility]        Dictated by (CST): Nathan Hutton MD on 10/15/2024 at 5:30 PM         ECHO 10/18/2024   Conclusions:     1. Left ventricle: The cavity size was normal. Wall thickness was mildly      increased. Systolic function was normal. The estimated ejection fraction      was 65-70%, by visual assessment. No diagnostic evidence for regional      wall motion abnormalities. Doppler parameters are consistent with      abnormal left ventricular relaxation - grade 1 diastolic dysfunction.   2. Left atrium: The atrium was moderately dilated.   3. Aortic root: The aortic root was 3.7cm diameter.   4. Ascending aorta: The ascending aorta was 3.5cm diameter.   5. Mitral valve: The annulus was moderately calcified. The leaflets were      normal thickness. The mean diastolic gradient was 10mm Hg at a HR of      81bpm.   6. Pericardium, extracardiac: A moderate pericardial effusion was      identified. There was no evidence of hemodynamic compromise.   Impressions:  This study is compared with previous dated 10-15-24:   Pericardial effusion is largely unchanged.   *     ----------------------------------------------------------------------------   *   Findings:   Left ventricle:  The cavity size was normal. Wall thickness was mildly   increased. Systolic function was normal. The estimated ejection fraction was   65-70%, by visual assessment. No diagnostic evidence for regional wall   motion abnormalities. Doppler parameters are consistent with abnormal left   ventricular relaxation - grade 1 diastolic dysfunction.   Left atrium:  The atrium was moderately dilated.   Right ventricle:  The cavity size was normal. Systolic function was normal.   Right atrium:  The atrium was normal in size.   Mitral valve:  The annulus was moderately calcified. The leaflets were   normal  thickness.  Doppler:  Transvalvular velocity was increased. The   findings were consistent with stenosis. There was trivial regurgitation.   The planimetered valve area was 1.36cm^2. The valve area index by planimetry   was 0.77cm^2/m^2. The valve area (LVOT continuity) was 1.77cm^2. The valve   area index (LVOT continuity) was 1cm^2/m^2.    The mean diastolic gradient   was 10mm Hg. At a HR of 81bpm.   Aortic valve:   The valve was trileaflet. The leaflets were mildly thickened   and mildly calcified.   Tricuspid valve:  The valve is structurally normal. Leaflet separation was   normal.   Pulmonic valve:   The valve is structurally normal. Cusp separation was   normal.   Pericardium: A moderate pericardial effusion was identified. There was no   evidence of hemodynamic compromise.   Aorta:   Aortic root: The aortic root was 3.7cm diameter.   Ascending aorta: The ascending aorta was 3.5cm diameter.   Pulmonary arteries:   The main pulmonary artery was normal-sized.   Systemic veins:  Central venous respirophasic diameter changes are blunted   (< 50%).   Inferior vena cava: The IVC was dilated.          Impression:    Acute hypoxic respiratory failure due to volume overload with renal failure: resolved   Hx smoking at least 98 pack years of tobacco quit in 2003   COPD/Emphysema as noted on CT chest, severity unknown  Type II diabetes mellitus   CKD III  Hypertension  Hyperlipidemia  BPH requiring suprapubic catheter                              Plan:    Continue Breztri 160-9-4.8 mcg 2 puffs twice daily   Albuterol HFA MDI 2 puffs 4 times daily   Check Complete PFT prior to next visit   Urology follow up     Follow up:  6  months     Thank you for allowing me to participate in your patient care.    MIKE Robledo MD, FACP, FCCP, FAASM - Pulmonary/Critical care/Sleep Medicine  Please contact our office if you have any questions or concerns at [phone]    Note to the patient: The 21st Century Cures Act makes medical  notes like these available to patients in the interest of transparency. However, be advised that this is a medical document. It is intended as peer to peer communication. It is written in medical language and may contain abbreviations or verbiage that are unfamiliar. It may appear blunt or direct. Medical documents are intended to carry relevant information, facts as evident, and clinical opinion of the practitioner.      Disclaimer: Components of this note were documented using voice recognition system and are subject to errors not corrected at proofreading. Contact the author of this note for any clarifications       [1] No Known Allergies

## 2024-11-13 NOTE — PATIENT INSTRUCTIONS
Plan:    Continue Breztri 160-9-4.8 mcg 2 puffs twice daily   Albuterol HFA MDI 2 puffs 4 times daily   Check Complete PFT prior to next visit   Urology follow up     Follow up:  6  months       Patrick Robledo MD      What Is COPD?  COPD stands for chronic obstructive pulmonary disease. It means the airways in your lungs are blocked (obstructed). This makes it hard to breathe. You may have trouble with daily activities because of shortness of breath. Over time, the shortness of breath often gets worse. This makes it harder to take care of yourself and take part in activities. Chronic bronchitis and emphysema are 2 common types of COPD.   How did I get COPD?   Most people get COPD from smoking. Cigarette smoke damages lungs. This can develop into COPD over many years.   How COPD affects you   COPD makes you work harder to breathe. Air may get trapped in the lungs. This prevents your lungs from filling completely with fresh oxygen-filled air when you breathe in (inhale). It's harder to take deep breaths, especially when you are active and start breathing faster. Over time, your lungs may become enlarged, filled with air that does not transfer oxygen into the blood. These problems lead to shortness of breath (also called dyspnea). Hoarse, whistling breathing (wheezing) and a chronic cough are common. So is feeling tired and worn-out (fatigue).    What happens in chronic bronchitis?     The cells in the airways make more mucus than normal. The mucus builds up, narrowing the airways. This means less air travels into and out of the lungs. The lining of the airways may also become swollen (inflamed). This causes the airways to narrow even more.   What happens in emphysema?     The small airways are damaged and lose their stretchiness. The airways collapse when you exhale. This causes air to get trapped in the air sacs. So less oxygen enters the blood vessels. And less oxygen is delivered to all the  cells of your body. This makes it hard to breathe.   Damage to cilia     Cilia are small hairs that line and protect the airways. Smoking damages the cilia. Damaged cilia can’t sweep mucus and particles away. Some of the cilia are destroyed. This damage makes COPD worse.   How daily issues affect your health  Many things in your daily life impact your health. This can include transportation, money problems, housing, access to food, and . If you can’t get to medical appointments, you may not receive the care you need. When money is tight, it may be difficult to pay for medicines. And living far from a grocery store can make it hard to buy healthy food.   If you have concerns in any of these or other areas, talk with your healthcare team. They may know of local resources to assist you. Or they may have a staff person who can help.   [employer] last reviewed this educational content on 12/1/2021  © 3399-9485 [employer]. All rights reserved. This information is not intended as a substitute for professional medical care. Always follow your healthcare professional's instructions.        Treatment for COPD    Your healthcare provider will prescribe the best treatments for your COPD.  Treatment  Advice includes:  Medicines. Some medicines help ease symptoms when they happen. Others are taken every day to control lung inflammation. Always take your medicines as prescribed. Learn the names of your medicines. Also know how and when to use them. Talk with your provider about other conditions you have and the medicines you take. Always use the correct technique for your metered dose inhaler or nebulizer.. Ask your healthcare provider how to use your medicine delivery device. You can also check the user manual of the device.  Tests. To monitor COPD risks, your provider may advise a blood test or sputum test for eosinophil count. Or you may need bronchodilator reversibility testing. All people with COPD  should be screened once for alpha-1 antitrypsin deficiency (AATD).  Surgery. Your healthcare provider may advise that you have surgery. This might be lung volume reduction surgery, a bullectomy, or a lung transplant.  Oxygen therapy.  You may need to take oxygen if tests show that your blood has too little of it. Ask your healthcare provider about long-term oxygen therapy.  Smoking. If you smoke, quit. Smoking is the main cause of COPD. Quitting will help you be able to better manage your COPD. Also don't use e-cigarettes or vaping products. Ask your healthcare provider about ways to help you quit smoking.  Prevent infections.  Infections such as a cold or the flu can make your symptoms worse. Try to stay away from people who are sick. Wash your hands often. And ask your healthcare provider about vaccines for the flu and pneumonia.  Coping with shortness of breath  Coping tips include:  Exercise. Be as active as you can. This will help your energy. It will also strengthen your muscles, so you can do more.  Breathing methods. Ask your healthcare provider or nurse to show you how to do pursed-lip breathing.  Pollution. Stay away from both indoor and outdoor pollution. Indoor pollution includes things such as burning wood, smoke from home cooking, and heating fuels. Outdoor pollution includes things such as dusts, vapors, fumes, gases, and other chemicals.  Balance rest and activity.  Try to balance rest with activity. For example, you might start the day with getting dressed and eating breakfast. Then you can relax and read the paper. After that, take a brief walk. And then sit with your feet up for a while.  Pulmonary rehab.   Community-based and home-based rehab programs work as well as hospital-based programs if they are done as often and as intensely. Standard home-based pulmonary rehab programs can help you with dyspnea. Traditional pulmonary rehab remains the standard of care. It's the best choice for people  with COPD. These programs help manage your disease. They also help with breathing methods and exercise. They can provide support and counseling. To find one, ask your provider or call your local hospital. Also talk with your healthcare provider about which rehab or self-care program is best for you.  Healthy eating. A healthy, balanced diet is important to help you stay as healthy as possible. So is staying at your ideal weight. Being overweight or underweight can have affect your health. Make sure you have a lot of fruits and vegetables every day. And also eat balanced portions of whole grains, lean meats and fish, and low-fat dairy products.  [employer] last reviewed this educational content on 1/1/2022  © 8969-8432 [employer]. All rights reserved. This information is not intended as a substitute for professional medical care. Always follow your healthcare professional's instructions.

## 2024-11-19 NOTE — PROGRESS NOTES
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    Following for:  1. Urinary retention  - s/p cysto ([facility] [place]) 9/3/24 showing mild BPH regrowth  - s/p TURP 2018  2. 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 Recurrent UTIs  - UCx 9/3/24: E coli R T/S, amp    PCP - Jamila  Prior Urologist - Janice 9/18/24    Presents for check-up, SP tube change, discuss next steps.    Follows with a Urologist in [place] and was noted to have PVR > 500 mL in the past and pt initially refused moctezuma cath but eventually agreed to have moctezuma placement. His urologist told him his bladder was too weak and probably would not recover and recommended consideration for either CIC versus chronic moctezuma. They had also discussed SP tube placement but his urologist only performs this procedure on the [facility] so he came to stay with his daughter in [place] and wants to have any procedures done here if possible and plans to follow back up with his Urologist in [place] for office visits.    Lives in [place]. Currently here because he can't void.  Lives with daughter. Using walker to ambulate.  He is taking flomax.    Gross hematuria: none  Tobacco hx: 30, quit 1990  Kidney stone hx: none  Fam h/o  malignancy: none    Prior PSAs:  - 6.9 9/3/24  - 5.5 4/21/21  - 5.4 7/30/20  - 2.5 9/17/18  - 2.5 6/13/14    Cr 1.58 8/26/24    Drinks ~ 40-60 oz water with light yellow urine.    UDS today: first sensation 20 mL, first desire 98 mL, strong desire 242mL, max bladder capacity of 255 mL. No leakage with valsalva. The patient was not able to void with max detrusor pressure of zero cm H2O and 255 mL PVR.    Cysto LOV: s/p TURP with open bladder neck, minimal residual prostatic tissue    CT SP 10/16/24: small b/l kidney stones    Will continue monthly SPT changes with his Urologist in [place]. He may decide to f/u with us in 6 mo for check-up. If he establishes with us  regularly would consider stopping flomax and would need to monitor PSAs.  He will increase his water intake for UTI and stone prevention.      HISTORY:  Past Medical History:    AMS (altered mental status)    Ataxia    Atherosclerosis of coronary artery    CKD (chronic kidney disease), stage III (HCC)    Diabetes (HCC)    Diverticulosis of large intestine    Esophageal reflux    Essential hypertension    High blood pressure    High cholesterol    Hyperlipidemia    Pulmonary emphysema (HCC)    Renal disorder    Visual impairment      Past Surgical History:   Procedure Laterality Date    Amputation metatarsal+toe,single Left     LEFT GREAT TOE    Cataract      Cath, suprapubic/cystoscopic  10/2024    Other surgical history        Family History   Problem Relation Age of Onset    No Known Problems Father     No Known Problems Mother     No Known Problems Sister     No Known Problems Brother       Social History:   Social History     Socioeconomic History    Marital status: Single   Occupational History    Occupation: [employer]     Comment: [employer]  exposed to asbestos and fumes   Tobacco Use    Smoking status: Former     Current packs/day: 0.00     Average packs/day: 2.0 packs/day for 49.0 years (98.0 ttl pk-yrs)     Types: Cigarettes     Start date:      Quit date:      Years since quittin.9    Smokeless tobacco: Never   Vaping Use    Vaping status: Never Used   Substance and Sexual Activity    Alcohol use: Not Currently    Drug use: Not Currently   Other Topics Concern    Caffeine Concern Yes     Comment: coffee occ     Exercise No     Social Drivers of Health     Food Insecurity: No Food Insecurity (10/15/2024)    Food Insecurity     Food Insecurity: Never true   Transportation Needs: No Transportation Needs (10/15/2024)    Transportation Needs     Lack of Transportation: No   Housing Stability: Low Risk  (10/15/2024)    Housing Stability     Housing Instability: No        Medications  (Active prior to today's visit):  Current Outpatient Medications   Medication Sig Dispense Refill    SKYLAI AEROSPHERE 160-9-4.8 MCG/ACT Inhalation Aerosol INHALE 2 PUFFS INTO LUNGS TWICE A DAY 10.7 g 1    Potassium Chloride ER 20 MEQ Oral Tab CR TAKE 1 TABLET BY MOUTH DAILY WITH FOOD FOR 3 DAYS      albuterol 108 (90 Base) MCG/ACT Inhalation Aero Soln Inhale 2 puffs into the lungs every 6 (six) hours as needed for Wheezing. inhale 2 puff by inhalation route  every 4 - 6 hours as needed 1 each 3    HYDROcodone-acetaminophen (NORCO) 5-325 MG Oral Tab Take 1 tablet by mouth every 6 (six) hours as needed for Pain. 30 tablet 0    dilTIAZem  MG Oral Capsule SR 24 Hr Take 1 capsule (120 mg total) by mouth daily. 90 capsule 0    losartan 50 MG Oral Tab Take 1 tablet (50 mg total) by mouth 2 (two) times daily.      pantoprazole 40 MG Oral Tab EC Take 1 tablet (40 mg total) by mouth every morning.      Cholecalciferol 50 MCG (2000 UT) Oral Tab Take 1 tablet (2,000 Units total) by mouth daily.      Ferrous Sulfate 325 (65 Fe) MG Oral Tab Take 1 tablet (325 mg total) by mouth daily with breakfast.      metFORMIN 500 MG Oral Tab Take 1 tablet (500 mg total) by mouth daily with breakfast.      carvedilol 25 MG Oral Tab Take 1 tablet (25 mg total) by mouth 2 (two) times daily.      cilostazol 100 MG Oral Tab Take 1 tablet (100 mg total) by mouth 2 (two) times daily.      rosuvastatin 10 MG Oral Tab Take 1 tablet (10 mg total) by mouth nightly.      tamsulosin 0.4 MG Oral Cap Take 1 capsule (0.4 mg total) by mouth nightly.         Allergies:  No Known Allergies      ROS:     A comprehensive 10 point review of systems was completed.  Pertinent positives and negatives noted in the the HPI.    PHYSICAL EXAM:     GENERAL APPEARANCE: well, developed, well nourished, in no acute distress  NEUROLOGIC: nonfocal, alert and oriented  HEAD: normocephalic, atraumatic  EYES: sclera non-icteric  EARS: hearing intact  ORAL CAVITY: mucosa  moist  NECK/THYROID: no obvious goiter or masses  LUNGS: nonlabored breathing  ABDOMEN: soft, no obvious masses or tenderness  SKIN: no obvious rashes    : as noted above    ASSESSMENT/PLAN:   Diagnoses and all orders for this visit:    Urinary retention    BPH with obstruction/lower urinary tract symptoms    Bacteriuria    LA (acute kidney injury) (HCC)    Recurrent kidney stones    Rising PSA level    - as noted above.    Thanks again for this consult.    Rikki Gamez MD, FACS  Urologist  [facility]  Office: [phone]

## 2024-11-20 RX ORDER — BUDESONIDE, GLYCOPYRROLATE, AND FORMOTEROL FUMARATE 160; 9; 4.8 UG/1; UG/1; UG/1
2 AEROSOL, METERED RESPIRATORY (INHALATION) 2 TIMES DAILY
Qty: 10.7 G | Refills: 1 | Status: SHIPPED | OUTPATIENT
Start: 2024-11-20

## 2024-11-26 ENCOUNTER — OFFICE VISIT (OUTPATIENT)
Dept: SURGERY | Facility: CLINIC | Age: 84
End: 2024-11-26
Payer: MEDICARE

## 2024-11-26 DIAGNOSIS — R97.20 RISING PSA LEVEL: ICD-10-CM

## 2024-11-26 DIAGNOSIS — R82.71 BACTERIURIA: ICD-10-CM

## 2024-11-26 DIAGNOSIS — N20.0 RECURRENT KIDNEY STONES: ICD-10-CM

## 2024-11-26 DIAGNOSIS — N13.8 BPH WITH OBSTRUCTION/LOWER URINARY TRACT SYMPTOMS: ICD-10-CM

## 2024-11-26 DIAGNOSIS — R33.9 URINARY RETENTION: Primary | ICD-10-CM

## 2024-11-26 DIAGNOSIS — N17.9 AKI (ACUTE KIDNEY INJURY) (HCC): ICD-10-CM

## 2024-11-26 DIAGNOSIS — N40.1 BPH WITH OBSTRUCTION/LOWER URINARY TRACT SYMPTOMS: ICD-10-CM

## 2024-11-26 PROCEDURE — 1160F RVW MEDS BY RX/DR IN RCRD: CPT | Performed by: UROLOGY

## 2024-11-26 PROCEDURE — 1159F MED LIST DOCD IN RCRD: CPT | Performed by: UROLOGY

## 2024-11-26 PROCEDURE — 99214 OFFICE O/P EST MOD 30 MIN: CPT | Performed by: UROLOGY

## 2024-11-26 NOTE — PROGRESS NOTES
Patient here for follow-up with Provider Dr. Gamez. Verbal orders given to insert SP Catheter.    Verified the patient's name, date of birth and his reason for the visit. Pt. Denies allergies.  Steps of the procedure explained. Safely assisted patient to the exam table and put him in a comfortable position. While in standing position, RN asked lower his pants and undergarments. Blue chux under his buttocks. Draped the private area.  Suprapubic area observed to be clean and pink. No redness or swelling.     Donned gloves. Removed the straps and the leg bag. Urine was yellow and no sedimentation observed.    Chux on patient's thigh. With the empty syringe, deflated the balloon of his entire content of 15cc. Suprapubic cath removed slowly encouraging the patient to breath slowly. Leg bag was also removed, disposed it with the chux and syringe. Denies pain.     Sterile field started. Applied sterile gloves. The suprapubic site was wiped with Betadine.  Patient verbalized understanding. 16 Fr Bard Hydrogel/Silver Alloy Coating was inserted successfully. Patient little discomfort. Inflated the balloon with 10ml of sterile water. Stat Lock was applied to his left upper thigh giving enough slack on the tubing to the leg bag. Leg bag applied to his left lower leg and straps adjusted accordingly.  Given stat lock supply  and extra leg bag at discharge. No questions at this time.  Patient tolerated procedure well.  Assisted patient with placing his pants and belt.                             Assisted patient to comfortable position and helped him pull his pants and undergarments. Washed hands    Provided education and instructions to monitor urination. Patient was able to urinate a little before sending him home.

## 2024-12-27 RX ORDER — BUDESONIDE, GLYCOPYRROLATE, AND FORMOTEROL FUMARATE 160; 9; 4.8 UG/1; UG/1; UG/1
2 AEROSOL, METERED RESPIRATORY (INHALATION) 2 TIMES DAILY
Qty: 10.7 G | Refills: 5 | Status: SHIPPED | OUTPATIENT
Start: 2024-12-27

## 2024-12-27 NOTE — TELEPHONE ENCOUNTER
Pt last seen by Dr. Robledo on 11-13-24.  Pt advised to continue Breztri and to follow up in 6 months.  Appt scheduled for 5-15-25.  Refill for Breztri sent.

## 2025-02-27 ENCOUNTER — TELEPHONE (OUTPATIENT)
Facility: CLINIC | Age: 85
End: 2025-02-27

## 2025-02-27 RX ORDER — BUDESONIDE, GLYCOPYRROLATE, AND FORMOTEROL FUMARATE 160; 9; 4.8 UG/1; UG/1; UG/1
2 AEROSOL, METERED RESPIRATORY (INHALATION) 2 TIMES DAILY
Qty: 10.7 G | Refills: 1 | Status: SHIPPED | OUTPATIENT
Start: 2025-02-27

## 2025-02-27 NOTE — TELEPHONE ENCOUNTER
Received request for: Breztri     Patient last seen by: Dr. Robledo on 11/13/2024    Has scheduled appointment: 5/15/2025    Physician recommendation: routed to physician

## 2025-05-08 ENCOUNTER — TELEPHONE (OUTPATIENT)
Dept: SURGERY | Facility: CLINIC | Age: 85
End: 2025-05-08

## 2025-05-08 NOTE — TELEPHONE ENCOUNTER
Patient daughter calling would like to know if patient could have catheter change on 5/15 visit?Please advise

## 2025-05-08 NOTE — TELEPHONE ENCOUNTER
This encounter is now closed.     Rn called daughter. No answer. Left message. Ok to have moctezuma change on 5/15 appt day with PA. Heath last changed on 4/10/25

## 2025-05-15 ENCOUNTER — OFFICE VISIT (OUTPATIENT)
Dept: SURGERY | Facility: CLINIC | Age: 85
End: 2025-05-15
Payer: MEDICARE

## 2025-05-15 DIAGNOSIS — N18.9 CHRONIC KIDNEY DISEASE, UNSPECIFIED CKD STAGE: ICD-10-CM

## 2025-05-15 DIAGNOSIS — R33.9 URINARY RETENTION: Primary | ICD-10-CM

## 2025-05-15 DIAGNOSIS — C61 PROSTATE CANCER (HCC): ICD-10-CM

## 2025-05-15 DIAGNOSIS — N20.0 NEPHROLITHIASIS: ICD-10-CM

## 2025-05-15 PROCEDURE — 99214 OFFICE O/P EST MOD 30 MIN: CPT | Performed by: PHYSICIAN ASSISTANT

## 2025-05-15 PROCEDURE — 51705 CHANGE OF BLADDER TUBE: CPT | Performed by: PHYSICIAN ASSISTANT

## 2025-05-15 PROCEDURE — 1159F MED LIST DOCD IN RCRD: CPT | Performed by: PHYSICIAN ASSISTANT

## 2025-05-15 NOTE — PROGRESS NOTES
RN received an order to change SPT. Patient up with roller walker. Patient here with family member.     Steps of the procedure explained. Hhe verbalized understanding and agreeable to plans. Genital area observed to clean and dry. Donned gloves. Exposed the lower abdomen. Chux on patient's thigh. Untangled the straps. Urine on the bag is yellow and sedimentation observed. With the empty syringe, deflated the balloon of its entire content of 10 c. Removed 16 Fr Joe Silicone along with his leg bag. Chux and syringes were disposed accordingly. Tolerated the removal of catheter.     Sterile field started. Applied sterile gloves. SPT site intact and dry. Site was wiped with Betadine. Patient verbalized understanding. 16Fr Silicone was inserted to his lower abdomen successfully. Denied pain. Inflated the balloon with 10ml of sterile water. Flow of yellow urine noted. Stat Lock was applied to his left upper thigh and and adjusted the catheter accordingly.         Patient to follow up monthly for SPT change. He is agreeable to plans.

## 2025-05-21 NOTE — PROGRESS NOTES
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      Following for:  1. Urinary retention  - s/p cysto ([facility] [place]) 9/3/24 showing mild BPH regrowth  - s/p TURP 2018  2. 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 Recurrent UTIs  - UCx 9/3/24: E coli R T/S, amp     PCP - Jamila    PRIOR NOTE:  Follows with a Urologist in [place] and was noted to have PVR > 500 mL in the past and pt initially refused moctezuma cath but eventually agreed to have moctezuma placement. His urologist told him his bladder was too weak and probably would not recover and recommended consideration for either CIC versus chronic moctezuma. They had also discussed SP tube placement but his urologist only performs this procedure on the [facility] so he came to stay with his daughter in [place] and wants to have any procedures done here if possible and plans to follow back up with his Urologist in [place] for office visits.     Lives in [place]. Currently here visiting daughter.    Gross hematuria: none  Tobacco hx: 30, quit 1990  Kidney stone hx: none  Fam h/o  malignancy: none    UDS today: first sensation 20 mL, first desire 98 mL, strong desire 242mL, max bladder capacity of 255 mL. No leakage with valsalva. The patient was not able to void with max detrusor pressure of zero cm H2O and 255 mL PVR.     Cysto LOV: s/p TURP with open bladder neck, minimal residual prostatic tissue     CT SP 10/16/24: small b/l kidney stones    Prior PSAs:  - 7.1 3/18/25  - 6.9 9/3/24  - 5.5 4/21/21  - 5.4 7/30/20  - 2.5 9/17/18  - 2.5 6/13/14    History/Other:    Chief Complaint Reviewed and Verified  Nursing Notes Reviewed and   Verified  Tobacco Reviewed  Allergies Reviewed  Medications Reviewed    Problem List Reviewed  Medical History Reviewed  Surgical History   Reviewed  Family History Reviewed         Current  Medications[1]    Review of Systems:  Pertinent items are noted in HPI.      Objective:   There were no vitals taken for this visit. Estimated body mass index is 24.69 kg/m² as calculated from the following:    Height as of 11/13/24: 5' 6\" (1.676 m).    Weight as of 11/13/24: 153 lb (69.4 kg).    No distress  SPT site clean/dry/intact    Laboratory Data:  Lab Results   Component Value Date    WBC 7.8 10/18/2024    HGB 11.5 (L) 10/18/2024    .0 10/18/2024     Lab Results   Component Value Date     10/18/2024    K 4.0 10/18/2024     10/18/2024    CO2 28.0 10/18/2024    BUN 19 10/18/2024     (H) 10/18/2024    GFRAA 57 (L) 06/25/2022    AST 21 10/15/2024    ALT 13 10/15/2024    TP 8.6 (H) 10/15/2024    ALB 4.9 (H) 10/15/2024    CA 10.0 10/18/2024       Urinalysis Results (last three years):  Recent Labs     06/24/22  1913 09/18/24  1310   COLORUR Yellow  --    CLARITY Cloudy*  --    SPECGRAVITY 1.016 1.015   PHURINE 5.0 5.5   PROUR 100*  --    GLUUR 50*  --    KETUR Negative  --    BILUR Negative  --    BLOODURINE Moderate*  --    NITRITE Positive* Negative   UROBILINOGEN <2.0  --    LEUUR Large*  --    WBCUR >50*  --    RBCUR >10*  --    BACUR 3+*  --        Urine Culture Results (last three years):  Lab Results   Component Value Date    URINECUL >100,000 CFU/ML Escherichia coli (A) 06/24/2022       Imaging  No results found.    Assessment & Plan:   Urinary retention   Managed with SPT  Due for change today  Continue q 4 week changes    Nephrolithiasis  CT scan as scheduled    CKD  Referral to nephrology provided at request    Low risk PCA  On active surveillance  Last PSA 7.1 3/18/25  Stable from prior check  Repeat in September 2025    Future Appointments   Date Time Provider Department Center   5/22/2025  9:00 PM PF CT RM1 PF CT [place]   6/16/2025 10:00 AM [facility] UROLOGY NURSE [id number]   6/17/2025 12:00 PM Patrick Robledo MD [facility]m [facility] Amalia Camacho,  JEREMIAS       [1]   Current Outpatient Medications   Medication Sig Dispense Refill    KAYODE AEROSPHERE 160-9-4.8 MCG/ACT Inhalation Aerosol INHALE 2 PUFFS INTO LUNGS TWICE A DAY 10.7 g 1    Potassium Chloride ER 20 MEQ Oral Tab CR TAKE 1 TABLET BY MOUTH DAILY WITH FOOD FOR 3 DAYS      albuterol 108 (90 Base) MCG/ACT Inhalation Aero Soln Inhale 2 puffs into the lungs every 6 (six) hours as needed for Wheezing. inhale 2 puff by inhalation route  every 4 - 6 hours as needed 1 each 3    dilTIAZem  MG Oral Capsule SR 24 Hr Take 1 capsule (120 mg total) by mouth daily. 90 capsule 0    losartan 50 MG Oral Tab Take 1 tablet (50 mg total) by mouth 2 (two) times daily.      Cholecalciferol 50 MCG (2000 UT) Oral Tab Take 1 tablet (2,000 Units total) by mouth daily.      Ferrous Sulfate 325 (65 Fe) MG Oral Tab Take 1 tablet (325 mg total) by mouth daily with breakfast.      metFORMIN 500 MG Oral Tab Take 1 tablet (500 mg total) by mouth daily with breakfast.      carvedilol 25 MG Oral Tab Take 1 tablet (25 mg total) by mouth 2 (two) times daily.      cilostazol 100 MG Oral Tab Take 1 tablet (100 mg total) by mouth 2 (two) times daily.      rosuvastatin 10 MG Oral Tab Take 1 tablet (10 mg total) by mouth nightly.      tamsulosin 0.4 MG Oral Cap Take 1 capsule (0.4 mg total) by mouth nightly.      HYDROcodone-acetaminophen (NORCO) 5-325 MG Oral Tab Take 1 tablet by mouth every 6 (six) hours as needed for Pain. (Patient not taking: Reported on 5/15/2025) 30 tablet 0    pantoprazole 40 MG Oral Tab EC Take 1 tablet (40 mg total) by mouth every morning.

## 2025-05-22 ENCOUNTER — HOSPITAL ENCOUNTER (OUTPATIENT)
Dept: CT IMAGING | Age: 85
Discharge: HOME | End: 2025-05-22
Attending: UROLOGY
Payer: MEDICARE

## 2025-05-22 DIAGNOSIS — R31.21 ASYMPTOMATIC MICROSCOPIC HEMATURIA: ICD-10-CM

## 2025-05-22 PROCEDURE — 74176 CT ABD & PELVIS W/O CONTRAST: CPT | Performed by: UROLOGY

## 2025-05-27 NOTE — PROGRESS NOTES
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 He is accompanied by his daughter today.    He lives in [place] where he typically gets his medical care but is visiting his daughter here.     Has history of urinary retention s/p cysto and TURP. Was recommended to have moctezuma placement initially but refused. Was told that his bladder was too weak and unlikely to recover. Ultimately SP tube placement was discussed with his urologist in [place] but ultimately care here to have it placed. S/p SPT placement 10/2024.     Has followed with nephrology in [place] in the past, felt to have CKD due to diabetic nephropathy and obstructive uropathy. Baseline serum creatinine ~1.2-1.6 mg/dL until this year, now up to 1-5-1.8 mg/dL 4/2025. Labs obtained 5/29/2025 showed a serum creatinine of 1.80 mg/dL. Has had low grade proteinuria since 2019.    Metformin was recently discontinued due to GFR. He is planning to go back to [place] and return when he is due to see his medical providers next.    Denies any known family history of kidney disease.     ROS:    Denies fever/chills  Denies wt loss/gain  Denies HA or visual changes  Denies CP or palpitations  Denies SOB/cough/hemoptysis  Denies abd or flank pain  Denies N/V/D  Denies change in urinary habits or gross hematuria  Denies LE edema  Denies skin rashes/myalgias/arthralgias    PMH:  Past Medical History[1]    PSH:  Past Surgical History[2]    Medications (Active prior to today's visit):  Current Medications[3]    Allergies:  Allergies[4]    Social History:  Social Hx on file[5]     Family History:  Denies family history of kidney disease.    PHYSICAL EXAM:   /70   Wt 150 lb 8 oz (68.3 kg)   BMI 24.29 kg/m²    Wt Readings from Last 3  Encounters:   05/29/25 150 lb 8 oz (68.3 kg)   11/13/24 153 lb (69.4 kg)   10/24/24 141 lb 4.8 oz (64.1 kg)     General: Alert and oriented in no apparent distress.  HEENT: No scleral icterus, MMM  Neck: Supple, no KENNEY or thyromegaly  Cardiac: Regular rate and rhythm, S1, S2 normal  Lungs: Clear without wheezes, rales, rhonchi.    Abdomen: Soft, non-tender.   Extremities: Without clubbing, cyanosis or edema.  Neurologic:  normal affect,  moving all extremities  Skin: Warm and dry, no rashes    DATA:     COMPREHENSIVE PROFILE  Reviewed date:05/29/2025 09:53:30 AM  Interpretation:Abnormal  Performing Lab:[facility], [email or web address]  Notes/Report:   GLUCOSE 129 65 - 110 mg/dL     UREA NITROGEN BLOOD (BUN) 25 6 - 24 mg/dL     CREATININE, BLOOD 1.80 0.6 - 1.4 mg/dL     BUN/CREAT 13.9 5 - 26 MG/DL     SODIUM 138 135 - 148 mEq/L     POTASSIUM 4.1 3.5 - 5.3 mEq/L     CHLORIDES 105 98 - 108 mEq/L     ALK PHOSPHATASE 60 35 - 115 U/L     CARBON DIOXIDE 22 21 - 31 mEq/L     CALCIUM 9.0 8.3 - 11.0 mg/dL     SGOT/AST 12 7 - 48 U/L     TOTAL PROTEIN 6.9 6.0 - 8.5 g/dL     ALBUMIN 3.8 3.3 - 5.2 g/dL     TOTAL BILIRUBIN 0.7 0.2 - 1.2 mg/dL     SGPT/ALT 13 7 - 48 U/L     eGFR 36.2 > 60 mL/min            ASSESSMENT/PLAN:      1) Chronic kidney disease stage 3b: Has progressive CKD with a baseline serum creatinine of around 1.5-1.8 mg/dL in setting of diabetic nephropathy, small vessel disease d/t HTN and obstructive uropathy. He has evidence of proteinuria for many years as well. Discussed importance of staying well-hydrated, ensuring optimal control of DM2 and HTN as well as maintaining SPT. He is on losartan which has renal protective effects as well.     2) DM2: last HbA1c 6.5%, metformin recently discontinued per PCP due to decreasing GFR. Would consider addition of alternative anti-glycemic agent.     3) HTN: controlled, on coreg, losartan and imdur    4) HLD: on crestor    5) Urinary retention: s/p TURP and now SP tube placement.  Follows with urology.    Thank you for allowing me to participate in this patient's care. Please feel free to call me with any questions or concerns.     Gabby Olivas MD    I spent a total of 45 minutes on the day of this visit reviewing chart, history, patient symptoms, underlying risk factors, medical conditions, diagnosis, formulation of treatment plan, counseling patient/family, and initiation of medical therapy.        [1]   Past Medical History:   AMS (altered mental status)    Arthritis    Ataxia    Atherosclerosis of coronary artery    CKD (chronic kidney disease), stage III (HCC)    Congestive heart disease (HCC)    Diabetes (HCC)    Diverticulosis of large intestine    Esophageal reflux    Essential hypertension    High blood pressure    High cholesterol    Hyperlipidemia    Pulmonary emphysema (HCC)    Renal disorder    UTI (urinary tract infection)    Visual impairment   [2]   Past Surgical History:  Procedure Laterality Date    Amputation metatarsal+toe,single Left     LEFT GREAT TOE    Cataract      Cath, suprapubic/cystoscopic  10/2024    Other surgical history     [3]   Current Outpatient Medications   Medication Sig Dispense Refill    isosorbide mononitrate ER 60 MG Oral Tablet 24 Hr Take 1 tablet (60 mg total) by mouth daily.      Ferrous Sulfate 325 (65 Fe) MG Oral Tab Take 1 tablet (325 mg total) by mouth daily with breakfast. (Patient taking differently: Take 1 tablet (325 mg total) by mouth 2 (two) times a day.)      Potassium Chloride ER 20 MEQ Oral Tab CR TAKE 1 TABLET BY MOUTH DAILY WITH FOOD FOR 3 DAYS (Patient not taking: Reported on 5/29/2025)      dilTIAZem  MG Oral Capsule SR 24 Hr Take 1 capsule (120 mg total) by mouth daily. 90 capsule 0    losartan 50 MG Oral Tab Take 1 tablet (50 mg total) by mouth 2 (two) times daily.      pantoprazole 40 MG Oral Tab EC Take 1 tablet (40 mg total) by mouth every morning.      Cholecalciferol 50 MCG (2000 UT) Oral Tab Take 1 tablet (2,000  Units total) by mouth daily.      metFORMIN 500 MG Oral Tab Take 1 tablet (500 mg total) by mouth daily with breakfast.      carvedilol 25 MG Oral Tab Take 1 tablet (25 mg total) by mouth 2 (two) times daily.      cilostazol 100 MG Oral Tab Take 1 tablet (100 mg total) by mouth 2 (two) times daily.      rosuvastatin 10 MG Oral Tab Take 1 tablet (10 mg total) by mouth nightly.      tamsulosin 0.4 MG Oral Cap Take 1 capsule (0.4 mg total) by mouth nightly.     [4] No Known Allergies  [5]   Social History  Socioeconomic History    Marital status: Single   Occupational History    Occupation: [employer]     Comment: [employer]  exposed to asbestos and fumes   Tobacco Use    Smoking status: Former     Current packs/day: 0.00     Average packs/day: 2.0 packs/day for 49.0 years (98.0 ttl pk-yrs)     Types: Cigarettes     Start date:      Quit date:      Years since quittin.4    Smokeless tobacco: Never   Vaping Use    Vaping status: Never Used   Substance and Sexual Activity    Alcohol use: Not Currently    Drug use: Not Currently   Other Topics Concern    Caffeine Concern Yes     Comment: coffee occ     Exercise No

## 2025-05-28 ENCOUNTER — MED REC SCAN ONLY (OUTPATIENT)
Dept: NEPHROLOGY | Facility: CLINIC | Age: 85
End: 2025-05-28

## 2025-05-29 ENCOUNTER — OFFICE VISIT (OUTPATIENT)
Dept: NEPHROLOGY | Facility: CLINIC | Age: 85
End: 2025-05-29
Payer: MEDICARE

## 2025-05-29 VITALS — BODY MASS INDEX: 24 KG/M2 | SYSTOLIC BLOOD PRESSURE: 132 MMHG | WEIGHT: 150.5 LBS | DIASTOLIC BLOOD PRESSURE: 70 MMHG

## 2025-05-29 DIAGNOSIS — I10 PRIMARY HYPERTENSION: ICD-10-CM

## 2025-05-29 DIAGNOSIS — E11.22 TYPE 2 DIABETES MELLITUS WITH STAGE 3B CHRONIC KIDNEY DISEASE, WITHOUT LONG-TERM CURRENT USE OF INSULIN (HCC): ICD-10-CM

## 2025-05-29 DIAGNOSIS — N18.32 STAGE 3B CHRONIC KIDNEY DISEASE (HCC): Primary | ICD-10-CM

## 2025-05-29 DIAGNOSIS — N18.32 TYPE 2 DIABETES MELLITUS WITH STAGE 3B CHRONIC KIDNEY DISEASE, WITHOUT LONG-TERM CURRENT USE OF INSULIN (HCC): ICD-10-CM

## 2025-05-29 PROCEDURE — 99204 OFFICE O/P NEW MOD 45 MIN: CPT | Performed by: INTERNAL MEDICINE

## 2025-05-29 RX ORDER — ISOSORBIDE MONONITRATE 60 MG/1
60 TABLET, EXTENDED RELEASE ORAL DAILY
COMMUNITY

## 2025-06-16 ENCOUNTER — NURSE ONLY (OUTPATIENT)
Dept: SURGERY | Facility: CLINIC | Age: 85
End: 2025-06-16

## 2025-06-16 ENCOUNTER — PATIENT MESSAGE (OUTPATIENT)
Dept: NEPHROLOGY | Facility: CLINIC | Age: 85
End: 2025-06-16

## 2025-06-16 DIAGNOSIS — R33.9 URINARY RETENTION: Primary | ICD-10-CM

## 2025-06-16 PROCEDURE — 51705 CHANGE OF BLADDER TUBE: CPT | Performed by: PHYSICIAN ASSISTANT

## 2025-06-16 RX ORDER — SULFAMETHOXAZOLE AND TRIMETHOPRIM 800; 160 MG/1; MG/1
1 TABLET ORAL ONCE
Status: COMPLETED | OUTPATIENT
Start: 2025-06-16 | End: 2025-06-16

## 2025-06-16 RX ADMIN — SULFAMETHOXAZOLE AND TRIMETHOPRIM 1 TABLET: 800; 160 TABLET ORAL at 10:19:00

## 2025-06-16 NOTE — PROGRESS NOTES
Pt here for SPT change.  Last changed on 5/15/25.  Pt noted to have a 16fr ALL SILICONE catheter in place. Pt denies any latex allergies.   Catheter attached to leg bag. Draining well. Pt c/o leaking where catheter attaches to leg bag.     Balloon deflated. Catheter removed with minor resistance. Calcifications noted on catheter.   Pt tolerated well.   Attempted to insert a 16fr latex catheter. Unable to insert. Resistance noted.   JEREMIAS Camacho came to room. Attempted to insert 16fr with no success.     JEREMIAS Camacho inserted 14fr latex catheter with no resistance. Balloon inflated.   Catheter draining clear yellow urine.   Attached to leg bag.     Per JEREMIAS Camacho, give 1 dose of Bactrim. 1 dose given. Pt denies allergies.   Pt to follow up in 2 weeks for upsize to 16fr moctezuma catheter.

## 2025-06-16 NOTE — PROGRESS NOTES
SPT removed by nursing staff prior to my evaluation. Notified RN unable to pass 16fr.  SPT site narrow, attempted to pass 16fr per tract with resistance, aborted.  Able to pass 14fr without difficulty with clear yellow urine drained.  Recommend abx x 1 and SPT exchange with nursing staff in 2 weeks - may upsize to 16fr at that time.

## 2025-06-19 PROBLEM — I50.42 CHRONIC COMBINED SYSTOLIC AND DIASTOLIC HEART FAILURE (HCC): Status: ACTIVE | Noted: 2024-11-13

## 2025-06-19 PROBLEM — R35.1 NOCTURIA ASSOCIATED WITH BENIGN PROSTATIC HYPERPLASIA: Status: ACTIVE | Noted: 2024-08-26

## 2025-06-19 PROBLEM — E78.5 HYPERLIPOPROTEINEMIA: Status: ACTIVE | Noted: 2025-06-19

## 2025-06-19 PROBLEM — R26.2 DIFFICULTY WALKING: Status: ACTIVE | Noted: 2025-06-19

## 2025-06-19 PROBLEM — R26.9 ABNORMAL GAIT: Status: ACTIVE | Noted: 2025-06-19

## 2025-06-19 PROBLEM — R07.9 CHEST PAIN, UNSPECIFIED: Status: ACTIVE | Noted: 2024-11-13

## 2025-06-19 PROBLEM — R80.9 PROTEINURIA: Chronic | Status: ACTIVE | Noted: 2025-06-19

## 2025-06-19 PROBLEM — R35.1 BENIGN PROSTATIC HYPERPLASIA WITH NOCTURIA: Status: ACTIVE | Noted: 2024-08-26

## 2025-06-19 PROBLEM — I50.32 CHRONIC DIASTOLIC HEART FAILURE (HCC): Status: ACTIVE | Noted: 2025-06-19

## 2025-06-19 PROBLEM — R93.89 ABNORMAL CHEST X-RAY: Status: ACTIVE | Noted: 2022-02-11

## 2025-06-19 PROBLEM — I05.0 MITRAL VALVE STENOSIS, MODERATE: Status: ACTIVE | Noted: 2024-11-13

## 2025-06-19 PROBLEM — I11.9 HYPERTENSIVE HEART DISEASE WITHOUT HEART FAILURE: Chronic | Status: ACTIVE | Noted: 2017-01-10

## 2025-06-19 PROBLEM — R41.3 MEMORY IMPAIRMENT: Status: ACTIVE | Noted: 2022-07-08

## 2025-06-19 PROBLEM — E11.9 TYPE 2 DIABETES MELLITUS WITHOUT COMPLICATIONS (HCC): Status: ACTIVE | Noted: 2023-07-26

## 2025-06-19 PROBLEM — R26.81 UNSTEADINESS: Status: ACTIVE | Noted: 2025-02-27

## 2025-06-19 PROBLEM — N17.9 ACUTE NONTRAUMATIC KIDNEY INJURY: Status: ACTIVE | Noted: 2022-06-24

## 2025-06-19 PROBLEM — N40.1 NOCTURIA ASSOCIATED WITH BENIGN PROSTATIC HYPERPLASIA: Status: ACTIVE | Noted: 2024-08-26

## 2025-06-19 PROBLEM — F01.50 VASCULAR DEMENTIA WITHOUT BEHAVIORAL DISTURBANCE, PSYCHOTIC DISTURBANCE, MOOD DISTURBANCE, OR ANXIETY (HCC): Status: ACTIVE | Noted: 2023-07-26

## 2025-06-19 PROBLEM — N40.1 BENIGN PROSTATIC HYPERPLASIA WITH NOCTURIA: Status: ACTIVE | Noted: 2024-08-26

## 2025-06-19 PROBLEM — E11.29 TYPE 2 DIABETES MELLITUS WITH OTHER DIABETIC KIDNEY COMPLICATION (HCC): Status: ACTIVE | Noted: 2025-06-19

## 2025-06-19 PROBLEM — C61 MALIGNANT NEOPLASM OF PROSTATE (HCC): Chronic | Status: ACTIVE | Noted: 2024-08-26

## 2025-06-19 PROBLEM — E13.00 HYPEROSMOLARITY DUE TO SECONDARY DIABETES MELLITUS (HCC): Status: ACTIVE | Noted: 2025-06-19

## 2025-06-19 PROBLEM — I05.0 RHEUMATIC MITRAL STENOSIS: Status: ACTIVE | Noted: 2025-06-19

## 2025-06-21 ENCOUNTER — APPOINTMENT (OUTPATIENT)
Dept: RESPIRATORY THERAPY | Facility: HOSPITAL | Age: 85
End: 2025-06-21
Attending: INTERNAL MEDICINE
Payer: MEDICARE

## 2025-06-21 DIAGNOSIS — J44.9 CHRONIC OBSTRUCTIVE PULMONARY DISEASE, UNSPECIFIED COPD TYPE (HCC): ICD-10-CM

## 2025-06-21 PROCEDURE — 94060 EVALUATION OF WHEEZING: CPT | Performed by: INTERNAL MEDICINE

## 2025-06-21 PROCEDURE — 94729 DIFFUSING CAPACITY: CPT | Performed by: INTERNAL MEDICINE

## 2025-06-21 PROCEDURE — 94726 PLETHYSMOGRAPHY LUNG VOLUMES: CPT | Performed by: INTERNAL MEDICINE

## 2025-06-23 ENCOUNTER — OFFICE VISIT (OUTPATIENT)
Facility: CLINIC | Age: 85
End: 2025-06-23
Payer: MEDICARE

## 2025-06-23 VITALS
OXYGEN SATURATION: 93 % | HEART RATE: 57 BPM | BODY MASS INDEX: 22.76 KG/M2 | SYSTOLIC BLOOD PRESSURE: 118 MMHG | HEIGHT: 67 IN | DIASTOLIC BLOOD PRESSURE: 56 MMHG | WEIGHT: 145 LBS

## 2025-06-23 DIAGNOSIS — J44.9 CHRONIC OBSTRUCTIVE PULMONARY DISEASE, UNSPECIFIED COPD TYPE (HCC): Primary | ICD-10-CM

## 2025-06-23 PROCEDURE — 1159F MED LIST DOCD IN RCRD: CPT

## 2025-06-23 PROCEDURE — 1160F RVW MEDS BY RX/DR IN RCRD: CPT

## 2025-06-23 PROCEDURE — 99214 OFFICE O/P EST MOD 30 MIN: CPT

## 2025-06-23 RX ORDER — ALBUTEROL SULFATE 90 UG/1
2 INHALANT RESPIRATORY (INHALATION) EVERY 6 HOURS PRN
Qty: 1 EACH | Refills: 0 | Status: SHIPPED | OUTPATIENT
Start: 2025-06-23

## 2025-06-23 NOTE — PROGRESS NOTES
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 Seen with his daughter who reports they had a difficult time completing the PFT- I.e. sliding out of chair, not following instructions, and the mouth piece in too far. Overall feels good. Denies acute concerns. Denies no cough, dyspnea, chest pain/pressure, wheezing, no fevers, chills, body aches, N/V/D, fatigue.      Previously 11/2024 Dr Venkat SWANSON had the pleasure of seeing Lawyer Vira Mccarty who is a pleasant 84 year old male who presents for evaluation of COPD after discharge form hospital   The patient was admitted to [facility] with Urinary retentiion and acute on chronic renal failure. He required suprapubic catheter placement. During the hospitalization the patient developed acute reparatory Failure  and required 2 LPM/NC oxygen that was weaned off. The patient has history of heavy smoking in past with diagnosis of emphysema so presented for pulmonary evaluation.   He currently lives in [place] and is visiting his daughter   On today's visit the He denies nasal congestion, cough, sputum production, chest pain, wheezing, shortness of breath, hemoptysis, fever, chills, nausea, vomiting, abdominal pains, focal weakness, weight loss or night sweats. He uses walker to be safe.  The patient states that he sleeps well.  He has no  pets.   Hx of tobacco use: He  reports that he quit smoking about 21 years ago. His smoking use included cigarettes. He started smoking about 70 years ago. He has a 98 pack-year smoking history. He has never used smokeless tobacco.    Previously 10/2024 Dr Hutson inpatient  Lawyer Vira Mccarty is a a(n) 84 year old male with a history of previous tobacco abuse who presents for chest pain.  He is being worked up by cardiology for this.  Of note, patient has  been requiring 4 L of oxygen.  He denies feeling sick, fevers, chills, or cough.  No diagnosis of COPD.  Patient reports no significant breathing issues prior to this.  Patient is a bit of a poor historian, reports that he quit smoking 20 to 30 years ago, and smoked for about 20 years.     Past Medical History:   Past Medical History[1]     Past Surgical History: Past Surgical History[2]    Family Medical History: Family History[3]     Social History:   Social History     Socioeconomic History    Marital status: Single     Spouse name: Not on file    Number of children: Not on file    Years of education: Not on file    Highest education level: Not on file   Occupational History    Occupation: [employer]     Comment: [employer]  exposed to asbestos and fumes   Tobacco Use    Smoking status: Former     Current packs/day: 0.00     Average packs/day: 2.0 packs/day for 49.0 years (98.0 ttl pk-yrs)     Types: Cigarettes     Start date:      Quit date: 2005     Years since quittin.4    Smokeless tobacco: Former   Vaping Use    Vaping status: Never Used   Substance and Sexual Activity    Alcohol use: Not Currently     Comment: Currently no drinks    Drug use: Never    Sexual activity: Not on file   Other Topics Concern     Service Not Asked    Blood Transfusions Not Asked    Caffeine Concern Yes     Comment: coffee occ     Occupational Exposure Not Asked    Hobby Hazards Not Asked    Sleep Concern Not Asked    Stress Concern Not Asked    Weight Concern Not Asked    Special Diet Not Asked    Back Care Not Asked    Exercise No    Bike Helmet Not Asked    Seat Belt Not Asked    Self-Exams Not Asked   Social History Narrative    Not on file     Social Drivers of Health     Food Insecurity: Not At Risk (2/15/2025)    [facility]     In the last 12 months, did you ever eat less than you felt you should because there wasn't enough money for food?: No    Transportation Needs: Not At Risk (2/15/2025)    Received from [facility]     In the last 12 months, have you ever had to go without health care because you didn't have a way to get there?: No   Stress: Not on file   Housing Stability: Not At Risk (2/15/2025)    Received from [facility]     Are you worried that in the next 2 months, you may not have stable housing?: No        Medications: Current Medications[4]    Review of Systems: Review of Systems   Constitutional: Negative.    HENT: Negative.     Respiratory: Negative.     Cardiovascular: Negative.    Gastrointestinal: Negative.         Physical Exam:  /56   Pulse 57   Ht 5' 7\" (1.702 m)   Wt 145 lb (65.8 kg)   SpO2 93%   BMI 22.71 kg/m²      Constitutional: alert, cooperative. No acute distress.  HEENT: Head NC/AT.   Cardio: Regular rate and rhythm. Normal S1 and S2. No murmurs.   Respiratory: Thorax symmetrical with no labored breathing. Clear to ausculation bilaterally with symmetrical breath sounds. No wheezing, rhonchi, rales, or crackles.   Extremities: No clubbing or cyanosis. No BLE edema.    Neurologic: A&Ox3. No gross motor deficits.  Skin: Warm, dry  Psych: Calm, cooperative. Pleasant affect.    Results:  Personally reviewed    WBC: 7.8, done on 10/18/2024.  HGB: 11.5, done on 10/18/2024.  PLT: 201, done on 10/18/2024.     Glucose: 143, done on 10/18/2024.  Cr: 1.28, done on 10/18/2024.  Last eGFR was 55 on 10/18/2024.  CA: 10, done on 10/18/2024.  Na: 140, done on 10/18/2024.  K: 4, done on 10/18/2024.  Cl: 105, done on 10/18/2024.  CO2: 28, done on 10/18/2024.  Last ALB was 4.9% done on 10/15/2024.     CT ABDOMEN+PELVIS KIDNEYSTONE 2D RNDR(NO IV,NO ORAL)(CPT=74176)  Result Date: 5/23/2025  CONCLUSION:  1. There is a suprapubic catheter present. 2. There are vascular calcifications in renal ingris bilaterally. 3. Specific etiology for hematuria is otherwise not  evident within the limits of this noncontrast study.  4. There is a moderate pericardial effusion which is stable. 5. There is diffuse aortic atherosclerosis without aneurysm.    LOCATION:  [facility]   Dictated by (CST): David Boyce MD on 5/23/2025 at 7:17 AM     Finalized by (CST): David Boyce MD on 5/23/2025 at 7:22 AM          Assessment/Plan:  #1. COPD  Stopped using breztri shortly after discharge   Continue albuterol as needed  6/2025 PFT FVC 1.76L, FEV1 1.10L, FEV1/FVC 63%, TLC 4.39L, DLCO 6.02 28%- severe obstruction and restriction- per family patient had a \"rough\" test- will wait for final report and see if he needs to repeat test.   Reviewed results in detail with the patient. All questions answered    #2. Tobacco abuse  49 pack years  Quit 2005  Does not qualify for lung cancer screen    Hilary Bearden [facility]  Pulmonary Medicine   6/23/2025          [1]   Past Medical History:   AMS (altered mental status)    Anemia    Arthritis    Ataxia    Atherosclerosis of coronary artery    Black stools    Blood in the stool    Body piercing    Ears    CKD (chronic kidney disease), stage III (HCC)    Congestive heart disease (HCC)    Diabetes (HCC)    Disorder of prostate    Diverticulosis of large intestine    Esophageal reflux    Essential hypertension    Fatigue    Hearing loss    High blood pressure    High cholesterol    Hyperlipidemia    Kidney failure    Leg swelling    Pain in joints    Pulmonary emphysema (HCC)    Renal disorder    UTI (urinary tract infection)    Visual impairment   [2]   Past Surgical History:  Procedure Laterality Date    Amputation metatarsal+toe,single Left     LEFT GREAT TOE    Cataract      Cath, suprapubic/cystoscopic  10/2024    Other surgical history     [3]   Family History  Problem Relation Age of Onset    No Known Problems Father     Diabetes Mother     No Known Problems Sister     No Known Problems Brother    [4]   Current Outpatient Medications   Medication Sig Dispense  Refill    aspirin 81 MG Oral Chew Tab Chew 1 tablet (81 mg total) by mouth daily.      FARXIGA 5 MG Oral Tab Take 1 tablet (5 mg total) by mouth daily.      hydrALAZINE 100 MG Oral Tab       isosorbide mononitrate ER 60 MG Oral Tablet 24 Hr Take 1 tablet (60 mg total) by mouth daily.      Potassium Chloride ER 20 MEQ Oral Tab CR TAKE 1 TABLET BY MOUTH DAILY WITH FOOD FOR 3 DAYS      dilTIAZem  MG Oral Capsule SR 24 Hr Take 1 capsule (120 mg total) by mouth daily. 90 capsule 0    losartan 50 MG Oral Tab Take 1 tablet (50 mg total) by mouth 2 (two) times daily.      pantoprazole 40 MG Oral Tab EC Take 1 tablet (40 mg total) by mouth every morning.      Cholecalciferol 50 MCG (2000 UT) Oral Tab Take 1 tablet (2,000 Units total) by mouth daily.      Ferrous Sulfate 325 (65 Fe) MG Oral Tab Take 1 tablet (325 mg total) by mouth daily with breakfast. (Patient taking differently: Take 1 tablet (325 mg total) by mouth 2 (two) times a day.)      metFORMIN 500 MG Oral Tab Take 1 tablet (500 mg total) by mouth daily with breakfast.      carvedilol 25 MG Oral Tab Take 1 tablet (25 mg total) by mouth 2 (two) times daily.      cilostazol 100 MG Oral Tab Take 1 tablet (100 mg total) by mouth 2 (two) times daily.      rosuvastatin 10 MG Oral Tab Take 1 tablet (10 mg total) by mouth nightly.      tamsulosin 0.4 MG Oral Cap Take 1 capsule (0.4 mg total) by mouth nightly.

## 2025-07-01 RX ORDER — ISOSORBIDE MONONITRATE 60 MG/1
60 TABLET, EXTENDED RELEASE ORAL DAILY
COMMUNITY

## 2025-07-03 ENCOUNTER — TELEPHONE (OUTPATIENT)
Dept: SURGERY | Facility: CLINIC | Age: 85
End: 2025-07-03

## 2025-07-03 NOTE — TELEPHONE ENCOUNTER
This encounter is now closed.     RN called daughter. Asking for an appt SPT change  Appt offered 7/11 Friday at 11AM, [place] location  She accepted and agreed to plans.

## 2025-07-03 NOTE — PROCEDURES
Pulmonary Function Test Report  Findings:  Prebronchodilator FEV1 is 1.10L, z-score -2.97.  Prebronchodilator FVC is 1.76L, z-score -2.86.                           Postbronchodilator FEV1 is 1.42L, z-score -2.29.  Postbronchodilator FVC is 2.15L, z-score -2.10.   FEV1/ FVC ratio is 63 %, z-score -1.42.   There is a significant bronchodilator response after administration of albuterol.    The flow-volume loop demonstrates a restrictive pattern.  The TLC is 4.39L, z-score -2.31. The residual volume 2.29L, z-score -0.31.   The diffusion capacity is 6.02, z-score -5.29 and -4.85 when corrected for alveolar volume.     Impression:  Severe dec in FEV1 and FVC, there is possible obstruction/scooping out of distal expiratory limb  There is mild (z-score -1.65 to -2.5) restriction.  This can be seen in heart failure, fluid overload states, interstitial lung disease, thoracic spine/chest disorders, obesity as well as other clinical scenarios.  Further clinical correlation required.      Diffusion capacity is severe (-4.0 and above).  Given the severity of the reduced diffusion capacity, would recommend evaluation for hypoxia and supplemental oxygenation if not already performed.     Disclaimer: This PFT has been interpreted based on Z-score/LLN/ULN criteria as described in ATS guidelines 2022.   Jennifer Hutson MD  [facility] Pulmonary Medicine  Office: [phone]

## 2025-07-03 NOTE — TELEPHONE ENCOUNTER
Per daughter canceled his appointment for catheter change 7/7 and needs to reschedule. Please call

## 2025-07-07 ENCOUNTER — HOSPITAL ENCOUNTER (OUTPATIENT)
Facility: HOSPITAL | Age: 85
Setting detail: HOSPITAL OUTPATIENT SURGERY
Discharge: HOME | End: 2025-07-07
Attending: STUDENT IN AN ORGANIZED HEALTH CARE EDUCATION/TRAINING PROGRAM | Admitting: STUDENT IN AN ORGANIZED HEALTH CARE EDUCATION/TRAINING PROGRAM
Payer: MEDICARE

## 2025-07-07 ENCOUNTER — ANESTHESIA (OUTPATIENT)
Dept: ENDOSCOPY | Facility: HOSPITAL | Age: 85
End: 2025-07-07
Payer: MEDICARE

## 2025-07-07 ENCOUNTER — ANESTHESIA EVENT (OUTPATIENT)
Dept: ENDOSCOPY | Facility: HOSPITAL | Age: 85
End: 2025-07-07
Payer: MEDICARE

## 2025-07-07 VITALS
BODY MASS INDEX: 24.99 KG/M2 | DIASTOLIC BLOOD PRESSURE: 69 MMHG | TEMPERATURE: 97 F | SYSTOLIC BLOOD PRESSURE: 159 MMHG | OXYGEN SATURATION: 94 % | HEIGHT: 65 IN | RESPIRATION RATE: 16 BRPM | HEART RATE: 58 BPM | WEIGHT: 150 LBS

## 2025-07-07 DIAGNOSIS — D50.9 IRON DEFICIENCY ANEMIA, UNSPECIFIED IRON DEFICIENCY ANEMIA TYPE: ICD-10-CM

## 2025-07-07 LAB — GLUCOSE BLD-MCNC: 75 MG/DL (ref 70–99)

## 2025-07-07 PROCEDURE — 88305 TISSUE EXAM BY PATHOLOGIST: CPT | Performed by: STUDENT IN AN ORGANIZED HEALTH CARE EDUCATION/TRAINING PROGRAM

## 2025-07-07 PROCEDURE — 82962 GLUCOSE BLOOD TEST: CPT

## 2025-07-07 RX ORDER — SODIUM CHLORIDE, SODIUM LACTATE, POTASSIUM CHLORIDE, CALCIUM CHLORIDE 600; 310; 30; 20 MG/100ML; MG/100ML; MG/100ML; MG/100ML
INJECTION, SOLUTION INTRAVENOUS CONTINUOUS
Status: DISCONTINUED | OUTPATIENT
Start: 2025-07-07 | End: 2025-07-07

## 2025-07-07 RX ORDER — NICOTINE POLACRILEX 4 MG
30 LOZENGE BUCCAL
Status: DISCONTINUED | OUTPATIENT
Start: 2025-07-07 | End: 2025-07-07

## 2025-07-07 RX ORDER — NALOXONE HYDROCHLORIDE 0.4 MG/ML
0.08 INJECTION, SOLUTION INTRAMUSCULAR; INTRAVENOUS; SUBCUTANEOUS ONCE AS NEEDED
Status: DISCONTINUED | OUTPATIENT
Start: 2025-07-07 | End: 2025-07-07

## 2025-07-07 RX ORDER — NICOTINE POLACRILEX 4 MG
15 LOZENGE BUCCAL
Status: DISCONTINUED | OUTPATIENT
Start: 2025-07-07 | End: 2025-07-07

## 2025-07-07 RX ORDER — LIDOCAINE HYDROCHLORIDE 10 MG/ML
INJECTION, SOLUTION EPIDURAL; INFILTRATION; INTRACAUDAL; PERINEURAL AS NEEDED
Status: DISCONTINUED | OUTPATIENT
Start: 2025-07-07 | End: 2025-07-07 | Stop reason: SURG

## 2025-07-07 RX ORDER — DEXTROSE MONOHYDRATE 25 G/50ML
50 INJECTION, SOLUTION INTRAVENOUS
Status: DISCONTINUED | OUTPATIENT
Start: 2025-07-07 | End: 2025-07-07

## 2025-07-07 RX ADMIN — LIDOCAINE HYDROCHLORIDE 100 MG: 10 INJECTION, SOLUTION EPIDURAL; INFILTRATION; INTRACAUDAL; PERINEURAL at 10:22:00

## 2025-07-07 NOTE — OPERATIVE REPORT
Colonoscopy Operative Report  Patient Name: Lawyer Vira Mccarty  YOB: 1940  MRN: [id number]  Procedure: Colonoscopy   Pre-operative Diagnosis & Indication: iron deficiency anemia   Post-operative Diagnosis: Inadequate bowel preparation, incomplete exam  Attending Endoscopist: Patrick Newman M.D.  Informed Consent: Informed Consent:   The planned procedure(s), the explanation of the procedure, its expected benefits, the potential complications and risks, and the possible alternatives (including their benefits and risks) were discussed with the patient and/or patient's legal representation/power of  in full. The discussion of risks, not limited to but including bleeding, infection, perforation or tear in the gastrointestinal tract, adverse effects from anesthesia, and possible prolonged hospitalization, emergency surgery, risk of morbidity or mortality, and risk of missed lesion(s) were discussed with patient and/or patient's legal representation/power of .  The risks and benefits of not undergoing the procedure(s), and associated risk of potential missed or delay in diagnosis were also reviewed. Patient and/or patient's legal representation/power of . understands the missed rate of colonoscopy of polyps, lesions of 5-10% even in the best of circumstances and that although this is an accurate test, there are limitations to colonoscopy. Patient and/or patient's legal representation/power of  understood the proposed procedure(s), and elected to proceed with the procedure(s) with possible intervention and endotherapy (such as polypectomy, biopsy, control of bleeding, etc.) and its risks, benefits and alternatives and wish to proceed with procedure(s). Ample time was given to ask questions, and all questions answered in full to patient's and/or patient's legal representation/power of  satisfaction.   Physical Exam: Heart: regular rate and rhythm. No rubs, murmurs, or  gallops. Lungs: Clear to auscultation bilaterally. Abdomen: Soft, non-tender, non distended. Positive bowel sounds. No rebound tenderness, no guarding.   A TIME OUT WAS COMPLETED prior to the procedure to confirm the patient, procedure and complete endoscopy safety procedure.   Sedation: Monitored Anesthesia Care; ASA class per anesthesiology team Monitoring: Pulsoximetry, pulse, respirations, and blood pressure, vitals were monitored throughout the entire procedure under monitored anesthesia care.   Preparation Quality:  [place] Bowel Prep Score: Inadequate bowel preparation - solid/semi solid stool precluded visualization  Procedure: After achieving adequate sedation, and placing the patient in the left lateral decubitus position, a digital rectal examination was performed.  The lubricated tip of the  colonoscope was then introduced into the rectum and advanced to the sigmoid colon - at which point procedure was aborted due to solid/semi-solid stool. The endoscope was then carefully withdrawn from the patient.   CO2 was suctioned to the best of my ability, during withdrawal of the endoscope.  The endoscope was righted, and CO2 was suctioned from the colon to the best of my ability, as it was during withdrawn from the colon.  The endoscope was then removed from the patient.  The patient tolerated the procedure without apparent procedural complications.  The patient left the procedure room in stable condition for recovery.  Toleration: Patient tolerated procedure well   Complications: No immediate complications   Technical Difficulty:  The procedure was not technically difficult   Estimated Blood Loss: Minimal, less than 5mL of estimated blood loss.   Findings and Therapeutics:  Colon:    Inadequate bowel preparation, incomplete exam.  Despite lavage and suction, solid / semi solid stool precluded visualization.  Recommendations:    Post Colonoscopy/polypectomy precautions, watch for bleeding, infection, perforation,  adverse drug reactions.   Continue Bowel Preparation today, clear liquid diet, NPO at Midnight  Plan for colonoscopy tomorrow     Patrick Newman MD  7/7/2025  10:37 AM

## 2025-07-07 NOTE — ANESTHESIA PREPROCEDURE EVALUATION
PRE-OP EVALUATION    Patient Name: Lawyer Vira Mccarty    Admit Diagnosis: Iron deficiency anemia, unspecified iron deficiency anemia type [D50.9]    Pre-op Diagnosis: Iron deficiency anemia, unspecified iron deficiency anemia type [D50.9]    ESOPHAGOGASTRODUODENOSCOPY (EGD), COLONOSCOPY    Anesthesia Procedure: ESOPHAGOGASTRODUODENOSCOPY (EGD), COLONOSCOPY  COLONOSCOPY    Surgeons and Role:     * Patrick Newman MD - Primary    Pre-op vitals reviewed.  Temp: 96.5 °F (35.8 °C)  Pulse: 68  Resp: 16  BP: 180/85  SpO2: 96 %  Body mass index is 24.96 kg/m².    Current medications reviewed.  Hospital Medications:  Current Medications[1]    Outpatient Medications:   Prescriptions Prior to Admission[2]    Allergies: Patient has no known allergies.      Anesthesia Evaluation    Patient summary reviewed.    Anesthetic Complications           GI/Hepatic/Renal      (+) GERD                           Cardiovascular                  (+) hypertension     (+) CAD                (+) CHF                Endo/Other      (+) diabetes                            Pulmonary        (+) COPD                   Neuro/Psych                                      Past Surgical History[3]  Social Hx on file[4]  History   Drug Use Unknown     Available pre-op labs reviewed.               Airway      Mallampati: II  Mouth opening: >3 FB  TM distance: > 6 cm  Neck ROM: full Cardiovascular    Cardiovascular exam normal.         Dental             Pulmonary    Pulmonary exam normal.                 Other findings              ASA: 3   Plan: MAC  NPO status verified and           Plan/risks discussed with: patient                Present on Admission:  **None**             [1]    glucose (Dex4) 15 GM/59ML oral liquid 15 g  15 g Oral Q15 Min PRN    Or    glucose (Glutose) 40% oral gel 15 g  15 g Oral Q15 Min PRN    Or    glucose-vitamin C (Dex-4) chewable tab 4 tablet  4 tablet Oral Q15 Min PRN    Or    dextrose 50% injection 50 mL  50 mL Intravenous Q15 Min  PRN    Or    glucose (Dex4) 15 GM/59ML oral liquid 30 g  30 g Oral Q15 Min PRN    Or    glucose (Glutose) 40% oral gel 30 g  30 g Oral Q15 Min PRN    Or    glucose-vitamin C (Dex-4) chewable tab 8 tablet  8 tablet Oral Q15 Min PRN    lactated ringers infusion   Intravenous Continuous   [2]   Facility-Administered Medications Prior to Admission   Medication Dose Route Frequency Provider Last Rate Last Admin    [COMPLETED] sulfamethoxazole-trimethoprim DS (Bactrim DS) 800-160 MG per tab 1 tablet  1 tablet Oral Once Janice Sevilla PA-C   1 tablet at 06/16/25 1019     Medications Prior to Admission   Medication Sig Dispense Refill Last Dose/Taking    isosorbide mononitrate ER 60 MG Oral Tablet 24 Hr Take 1 tablet (60 mg total) by mouth daily.   7/6/2025    albuterol 108 (90 Base) MCG/ACT Inhalation Aero Soln Inhale 2 puffs into the lungs every 6 (six) hours as needed. 1 each 0 7/6/2025    aspirin 81 MG Oral Chew Tab Chew 1 tablet (81 mg total) by mouth daily.   7/3/2025    FARXIGA 5 MG Oral Tab Take 1 tablet (5 mg total) by mouth daily.   7/2/2025    hydrALAZINE 100 MG Oral Tab    7/7/2025 Morning    losartan 50 MG Oral Tab Take 1 tablet (50 mg total) by mouth in the morning and 1 tablet (50 mg total) before bedtime.   7/7/2025 Morning    pantoprazole 40 MG Oral Tab EC Take 1 tablet (40 mg total) by mouth every morning.   7/6/2025    Cholecalciferol 50 MCG (2000 UT) Oral Tab Take 1 tablet (2,000 Units total) by mouth in the morning.   Taking    Ferrous Sulfate 325 (65 Fe) MG Oral Tab Take 1 tablet (325 mg total) by mouth in the morning and 1 tablet (325 mg total) before bedtime.   7/6/2025    carvedilol 25 MG Oral Tab Take 1 tablet (25 mg total) by mouth in the morning and 1 tablet (25 mg total) before bedtime.   7/7/2025 Morning    cilostazol 100 MG Oral Tab Take 1 tablet (100 mg total) by mouth in the morning and 1 tablet (100 mg total) before bedtime.   7/6/2025    rosuvastatin 10 MG Oral Tab Take 1 tablet (10  mg total) by mouth nightly.   2025    tamsulosin 0.4 MG Oral Cap Take 1 capsule (0.4 mg total) by mouth nightly.   2025    PEG 3350-KCl-Na Bicarb-NaCl 420 g Oral Recon Soln Take as directed by physician. 4000 mL 0    [3]   Past Surgical History:  Procedure Laterality Date    Amputation metatarsal+toe,single Left     LEFT GREAT TOE    Cataract      Cath, suprapubic/cystoscopic  10/2024    Other surgical history     [4]   Social History  Socioeconomic History    Marital status: Single   Occupational History    Occupation: [employer]     Comment: [employer]  exposed to asbestos and fumes   Tobacco Use    Smoking status: Former     Current packs/day: 0.00     Average packs/day: 2.0 packs/day for 49.0 years (98.0 ttl pk-yrs)     Types: Cigarettes     Start date:      Quit date: 2005     Years since quittin.4    Smokeless tobacco: Former   Vaping Use    Vaping status: Never Used   Substance and Sexual Activity    Alcohol use: Not Currently     Comment: Currently no drinks    Drug use: Never   Other Topics Concern    Caffeine Concern Yes     Comment: coffee occ     Exercise No

## 2025-07-07 NOTE — H&P
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    Indication:   Iron deficiency anemia  Seen in GI OV 6/19/25      [facility] labs with PCP   Dr Marino   12/2024   Hg 9.0   AST/ALT wnl  Iron level low 42 (11/2024) - although no iron saturation or TIBC  Ferritin 201.3     Patient was seen by GI team in 2023 in [place], there was some discussion about issues with the bowel prep.  Patient did not have an EGD or colonoscopy completed according to him or family member at the clinic visit.     No First Degree Relative with a history of Colorectal Cancer         No overt Blood in stool   No Abdominal pain     No Reflux symptoms   No Dysphagia       Medications reviewed as below:   No non-aspirin antithrombotic agents    Medications:  Medications - Current[1]    Past Medical History:  Past Medical History[2]    Past Surgical History:  Past Surgical History[3]    Family History:  Family History[4]    Social History:  Short Social Hx on File[5]    Review of Systems  Negative unless otherwise mentioned in HPI.     Allergies:  Allergies[6]      Objective:    Physical Exam  Ht 5' 5\" (1.651 m)   Wt 150 lb (68 kg)   BMI 24.96 kg/m²   Body mass index is 24.96 kg/m².    Gen: Awake and alert, NAD  CV: Ext warm b/l  Resp: no resp distress  Neuro: NAD, Aox3.     Pertinent labs:   Lab Results   Component Value Date     10/18/2024    K 4.0 10/18/2024     10/18/2024    CO2 28.0 10/18/2024    ANIONGAP 7 10/18/2024    BUN 19 10/18/2024     Lab Results   Component Value Date    WBC 7.8 10/18/2024    HGB 11.5 (L) 10/18/2024    HCT 32.4 (L) 10/18/2024    MCV 81.4 10/18/2024    .0 10/18/2024     Lab Results   Component Value Date    AST 21 10/15/2024    ALT 13 10/15/2024    CRP <0.40 10/16/2024    ALB 4.9 (H) 10/15/2024     Lab Results   Component Value Date    INR 1.21 (H) 10/15/2024        Imaging:  CT ABDOMEN+PELVIS KIDNEYSTONE 2D RNDR(NO IV,NO ORAL)(CPT=74176)  Narrative: PROCEDURE:  CT ABDOMEN+PELVIS KIDNEYSTONE 2D RNDR(NO IV,NO ORAL)(CPT=74176)     COMPARISON:  [facility] , CT, CT ABDOMEN+PELVIS KIDNEYSTONE 2D RNDR(NO IV,NO ORAL)(CPT=74176), 10/16/2024, 1:53 PM.     INDICATIONS:  R31.21 Asymptomatic microscopic hematuria     TECHNIQUE:  Unenhanced multislice CT scanning from above the kidneys to below the urinary bladder.  2D rendering are generated on the CT scanner workstation to localize potential stones in the cranio-caudal plane.  Dose reduction techniques were used.   Dose information is transmitted to the ACR (American College of Radiology) NRDR (National Radiology Data Registry) which includes the Dose Index Registry.     PATIENT STATED HISTORY: (As transcribed by Technologist)  hematuria         FINDINGS:    KIDNEYS:  There are calcifications in renal ingris bilaterally which are most likely vascular.  There are no obstructing renal or ureteral stones.  BLADDER:  There is interval placement of a suprapubic catheter which appears to be appropriately position within the urinary bladder.  ADRENALS:  No mass or enlargement.    LIVER:  No enlargement, atrophy, abnormal density, or significant focal lesion.    BILIARY:  No visible dilatation or calcification.    PANCREAS:  No lesion, fluid collection, ductal dilatation, or atrophy.    SPLEEN:  No enlargement or focal lesion.    AORTA/VASCULAR:  Aorta is diffusely atherosclerotic but not aneurysmal.  RETROPERITONEUM:  Left periaortic lymph node series 2, image 54 measures 2.7 x 1.2 cm (previously 2.5 x 1.4 cm).  This maintains a normal lymph node architecture.  BOWEL/MESENTERY:  There is diverticulosis of the colon.  There is no CT evidence of diverticulitis.  ABDOMINAL WALL:  No mass or hernia.    BONES:  No bony lesion or fracture.  PELVIC ORGANS:  There is mild enlargement of the prostate.  LUNG BASES:  There is moderate pericardial effusion.   This is stable.  OTHER:  Negative.                     Impression: CONCLUSION:    1. There is a suprapubic catheter present.  2. There are vascular calcifications in renal ingris bilaterally.  3. Specific etiology for hematuria is otherwise not evident within the limits of this noncontrast study.    4. There is a moderate pericardial effusion which is stable.  5. There is diffuse aortic atherosclerosis without aneurysm.           LOCATION:  [facility]        Dictated by (CST): David Boyce MD on 5/23/2025 at 7:17 AM       Finalized by (CST): David Boyce MD on 5/23/2025 at 7:22 AM          Informed consent  Informed Consent:   The planned procedure(s), the explanation of the procedure, indications, its expected benefits, the potential complications and risks and possible alternatives and their benefits and risks were discussed with the patient. The discussion of risks, not limited to but including bleeding, infection, perforation / tear in the gastrointestinal tract, adverse effects from anesthesia, and possible prolonged hospitalization, emergency surgery, risk of morbidity or mortality, and risk of missed lesion(s) were discussed with patient. Pt understands the missed rate of colonoscopy of polyps, lesions of 5-10% even in the best of circumstances and that although this is an accurate test, there are limitations to colonoscopy.     Patient understood the proposed procedure(s), and informed consented to the procedure and elected to proceed with the procedure(s) with possible intervention (such as polypectomy, biopsy, control of bleeding, etc.) and its risks, benefits and alternatives and wish to proceed with procedure(s). All questions answered in full to patient's  satisfaction in full.       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   Plan to proceed with EGD, Colonoscopy with anesthesia.     ASA class per anesthesia.   Informed  consent obtained as detailed above.       BERRY ADAME M.D.  [facility]                     [1] No current outpatient medications on file.  [2]   Past Medical History:   AMS (altered mental status)    Anemia    Arthritis    Ataxia    Atherosclerosis of coronary artery    Black stools    Blood in the stool    Body piercing    Ears    CKD (chronic kidney disease), stage III (HCC)    Congestive heart disease (HCC)    Diabetes (HCC)    Disorder of prostate    Diverticulosis of large intestine    Esophageal reflux    Essential hypertension    Fatigue    Hearing loss    High blood pressure    High cholesterol    Hyperlipidemia    Kidney failure    Leg swelling    Pain in joints    Pulmonary emphysema (HCC)    Renal disorder    kidney failure    UTI (urinary tract infection)    Visual impairment   [3]   Past Surgical History:  Procedure Laterality Date    Amputation metatarsal+toe,single Left     LEFT GREAT TOE    Cataract      Cath, suprapubic/cystoscopic  10/2024    Other surgical history     [4]   Family History  Problem Relation Age of Onset    No Known Problems Father     Diabetes Mother     No Known Problems Sister     No Known Problems Brother    [5]   Social History  Socioeconomic History    Marital status: Single   Occupational History    Occupation: [employer]     Comment: [employer]  exposed to asbestos and fumes   Tobacco Use    Smoking status: Former     Current packs/day: 0.00     Average packs/day: 2.0 packs/day for 49.0 years (98.0 ttl pk-yrs)     Types: Cigarettes     Start date:      Quit date: 2005     Years since quittin.4    Smokeless tobacco: Former   Vaping Use    Vaping status: Never Used   Substance and Sexual Activity    Alcohol use: Not Currently     Comment: Currently no drinks    Drug use: Never   Other Topics Concern    Caffeine Concern Yes     Comment: coffee occ     Exercise No     Social Drivers of Health     Food Insecurity: Not At Risk (2/15/2025)     [facility]     In the last 12 months, did you ever eat less than you felt you should because there wasn't enough money for food?: No   Transportation Needs: Not At Risk (2/15/2025)    [facility]     In the last 12 months, have you ever had to go without health care because you didn't have a way to get there?: No   Housing Stability: Not At Risk (2/15/2025)    [facility]     Are you worried that in the next 2 months, you may not have stable housing?: No   [6] No Known Allergies

## 2025-07-07 NOTE — ANESTHESIA POSTPROCEDURE EVALUATION
[facility]    Lawyer Vira Mccarty Patient Status:  Hospital Outpatient Surgery   Age/Gender 85 year old male MRN [id number]   Location [facility] ENDOSCOPY [facility] Attending Patrick Newman MD   Hosp Day # 0 PCP Laura Marino MD       Anesthesia Post-op Note    ESOPHAGOGASTRODUODENOSCOPY WITH BIOPSIES (EGD),  INCOMPLETE COLONOSCOPY    Procedure Summary       Date: 07/07/25 Room / Location:  ENDOSCOPY 02 / [facility]    Anesthesia Start: 1018 Anesthesia Stop: 1049    Procedures:       ESOPHAGOGASTRODUODENOSCOPY WITH BIOPSIES (EGD),  INCOMPLETE COLONOSCOPY      COLONOSCOPY Diagnosis:       Iron deficiency anemia, unspecified iron deficiency anemia type      (EGD: MELANOSIS COLON: POOR PREP)    Surgeons: Patrick Newman MD Anesthesiologist: Feliberto Canada MD    Anesthesia Type: MAC ASA Status: 3            Anesthesia Type: MAC    Vitals Value Taken Time   /72 07/07/25 10:49   Temp  07/07/25 10:51   Pulse 59 07/07/25 10:50   Resp  07/07/25 10:51   SpO2 91 % 07/07/25 10:50   Vitals shown include unfiled device data.        Patient Location: Endoscopy    Anesthesia Type: MAC    Airway Patency: patent    Postop Pain Control: adequate    Mental Status: preanesthetic baseline    Nausea/Vomiting: none    Cardiopulmonary/Hydration status: stable euvolemic    Complications: no apparent anesthesia related complications    Postop vital signs: stable    Dental Exam: Unchanged from Preop

## 2025-07-07 NOTE — DISCHARGE INSTRUCTIONS
Home Care Instructions for Colonoscopy and EGD With Sedation    Diet:  - Resume your regular diet as tolerated unless otherwise instructed.  - start with light meals to minimize bloating.  - Do not drink alcohol today.    Medication:  - If you have questions about resuming your normal medications, please contact your Primary Care Physician.    Activities:  - Take it easy today. Do not return to work today.  - Do not drive today.  - Do not operate any machinery today (including kitchen equipment).    Colonoscopy:  - You may notice some rectal \"spotting\" (a little blood on the toilet tissue) for a day or two after the exam. This is normal.  - If you experience any rectal bleeding (not spotting), persistent tenderness or sharp severe abdominal pains, oral temperature over 100 degrees Farenheit, light-headedness or dizziness, or any other problems, contact your doctor.    EGD:  - You may have a sore throat for 2-3 days following the exam. This is normal. Gargling with warm salt water (1/2 tsp salt to 1 glass warm water) or using throat lozenges will help.  - If you experience any sharp pain in your neck, abdomen or chest, vomiting of blood, oral temperature over 100 degrees Farenheit, light-headedness or dizziness, or any other problems, contact your doctor.    **If unable to reach your doctor, please go to the [facility] Emergency Room**    - Your referring physician will receive a full report of your examination.  - If you do not hear from your doctor's office within two weeks of your biopsy, please call them for your results.    Additional Comments/Instructions (if applicable):

## 2025-07-07 NOTE — OPERATIVE REPORT
EGD Operative Report  Patient Name: Lawyer Vira Mccarty  YOB: 1940  MRN: [id number]  Procedure: Esophagogastroduodenoscopy (EGD)  with biopsies   Pre-operative Diagnosis & Indication:   Iron deficiency anemia   Post-operative Diagnosis:  Duodenal melanosis   Gastric erythema, thickening   Attending Endoscopist: Patrick Newman M.D.  Informed Consent: The planned procedure(s), the explanation of the procedure, its expected benefits, the potential complications and risks and possible alternatives and their benefits and risks were discussed with the patient or the patient's surrogate. The discussion of risks, not limited to but including bleeding, infection, perforation, adverse effects from anesthesia, need for emergency surgery/prolonged hospitalization,  cardiac arrhythmias,  and aspiration were discussed with patient.  Pt and/or surrogate understood the proposed procedure(s), its risks, benefits and alternatives and wish to proceed with procedure(s). All questions answered in full.  After all questions were answered to their satisfaction, a signed, informed, and witnessed consent was obtained.  Physical Exam: Heart: regular rate and rhythm. No rubs, murmurs, or gallops. Lungs: Clear to auscultation bilaterally. Abdomen: Soft, non-tender, non distended. No rebound tenderness, no guarding.   A TIME OUT WAS COMPLETED prior to the procedure to confirm the patient, procedure(s) and complete endoscopy safety procedure.  Sedation: Monitored Anesthesia Care; ASA class per anesthesiology team   Monitoring: Pulsoximetry, pulse, respirations, and blood pressure , vitals were monitored throughout the entire procedure under monitored anesthesia care.   Procedure: The patient was then brought to the endoscopy suite where his/her pulse, pulse oximetry and blood pressure were monitored. The patient was placed in the left lateral decubitus position and deep sedation was administered. Once adequate sedation was achieved, a  bite block was placed and a lubricated tip of an Olympus video upper endoscope was inserted through the oropharynx and gently manipulated through the esophagus into the stomach and the second portion of the duodenum. Upon withdrawal of the endoscope, careful visualization of the mucosa was performed. The endoscope was then withdrawn into the gastric antrum and placed in a retroflexed position.  The endoscope was then righted, and air was suctioned from the stomach.  The endoscope was then withdrawn from the patient, with careful visual inspection of the mucosa. The patient left the procedure room in stable condition for recovery. Findings and endotherapy as listed below  Toleration: Patient tolerated procedure well   Complications: No immediate complications   Technical Difficulty:  The procedure was not technically difficult   Estimated Blood Loss: Minimal, less than 5mL of estimated blood loss.   Findings and Therapeutics:  Esophagus:   Normal mucosa.   No strictures. GE junction traversed with endoscope without resistance.  The Z-line was irregular, appreciated at 40 cm from the incisors.    Stomach:    Gastric erythema suggestive of gastropathy in the body, with thickening and polypoid areas - moderate in severity. Biopsied with cold forceps for histology, rule out dyplasia or H pylori.   Endoscope was placed in a retroflexion view in the stomach. There was  no evidence of a hiatal hernia.   Duodenum:   Diffuse melanosis duodenopathy -   Biopsies with cold forceps were obtained, rule out celiac spure.   Recommendations:  Post EGD precautions, watch for bleeding, infection, perforation, adverse drug reactions   Follow-up biopsies  Proceed with colonoscopy today   Avoid non-aspirin NSAIDs      Patrick Newman MD  7/7/2025  10:30 AM

## 2025-07-08 ENCOUNTER — RESULTS FOLLOW-UP (OUTPATIENT)
Dept: ENDOSCOPY | Facility: HOSPITAL | Age: 85
End: 2025-07-08

## 2025-07-08 NOTE — PROGRESS NOTES
Schedule EGD with colonoscopy (extended bowel prep) at   Gastric mapping      ---    Enrique Chaney,    You recently had an upper endoscopy (EGD) procedure completed with biopsies of the  stomach and small intestine.    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 In a very specific setting (such as specific ethnic backgrounds or with a family history of gastric cancer), this finding can lead to an increased risk of gastric cancer.      I recommend that we repeat an upper endoscopy procedure (EGD) procedure for further evaluation of this finding, at the same time as your next colonoscopy.        Please call the office if you have any questions or concerns about these results.     Sincerely,    Patrick Newman MD  [facility]  [phone]

## 2025-07-09 NOTE — TELEPHONE ENCOUNTER
Please call patient and schedule :    Schedule EGD with colonoscopy (extended bowel prep) at   Gastric mapping             Ordering Provider:   Provider to be scheduled with:    Procedure: EGD/Colonoscopy  Location:   Prep (if appropriate): PEG  Extended PEG prep - 2 days of clear liquids    2 days before procedure patient to consume one half of PEG prep between 6:00 pm and 8:00 pm.  1 day prior to procedure patient will follow standard prep instructions      Diagnosis:   Iron deficiency anemia, unspecified iron deficiency anemia type D50.9    Melena K92.1  Clearance(s):  Clearance(s) expiration date:  Mobile Clearance with date:   (Please put in appt notes okay per Mobile)

## 2025-07-09 NOTE — TELEPHONE ENCOUNTER
----- Message from Jer Alarcon MA sent at 7/9/2025 10:33 AM CDT -----      ----- Message -----  From: Patrick Newman MD  Sent: 7/8/2025  12:47 PM CDT  To: Mazin Newman Clinical Staff    Schedule EGD with colonoscopy (extended bowel prep) at [facility]      ---    Hi ,    You recently had an upper endoscopy (EGD) procedure completed with biopsies of the  stomach and small intestine.    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 In a very specific setting   (such as specific ethnic backgrounds or with a family history of gastric cancer), this finding can lead to an increased risk of gastric cancer.      I recommend that we repeat an upper endoscopy procedure (EGD) procedure for further evaluation of this finding, at the same time as your next colonoscopy.        Please call the office if you have any questions or concerns about these results.     Sincerely,    Patrick Newman MD  [facility]  [phone]    ----- Message -----  From: Lab, Background User  Sent: 7/8/2025  12:14 PM CDT  To: Patrick Newman MD

## 2025-07-10 ENCOUNTER — NURSE ONLY (OUTPATIENT)
Dept: SURGERY | Facility: CLINIC | Age: 85
End: 2025-07-10

## 2025-07-10 DIAGNOSIS — R33.9 RETENTION OF URINE: Primary | Chronic | ICD-10-CM

## 2025-07-10 PROCEDURE — 51705 CHANGE OF BLADDER TUBE: CPT | Performed by: PHYSICIAN ASSISTANT

## 2025-07-10 NOTE — PROGRESS NOTES
Received patient for his monthly SPT change. Unsteady gait noted. Up with roller walker.    Steps of the procedure explained. He verbalized understanding and agreeable to plans. He lowered his pants and laid down at the exam table. Blue chux on his upper thigh. Genital area observed to clean and dry. Donned gloves. Chux on patient's thigh.  Urine on the bag is yellow and no sedimentation observed. With the empty syringe, deflated the balloon of his entire content of 10cc. Removed 14Fr Joe along with his leg bag. Chux and syringes were disposed accordingly. Tolerated the removal of Joe.      Sterile field started. Applied sterile gloves. The insertion site at lower abdomen area was wiped with Betadine. With lubricated catheter, 14 Fr inserted smoothly and successfully. Denied pain. Inflated the balloon with 10ml of sterile water. Flow of yellow urine noted. Stat Lock applied to his left upper thigh and secured the leg bag.      Patient to follow up in a month. He is agreeable to plans.  Son-in-law is aware.

## 2025-07-16 ENCOUNTER — HOSPITAL ENCOUNTER (OUTPATIENT)
Dept: ULTRASOUND IMAGING | Facility: HOSPITAL | Age: 85
Discharge: HOME | End: 2025-07-16
Attending: FAMILY MEDICINE
Payer: MEDICARE

## 2025-07-16 DIAGNOSIS — R09.89 ABSENT PEDAL PULSES: ICD-10-CM

## 2025-07-16 DIAGNOSIS — E11.9 TYPE 2 DIABETES MELLITUS (HCC): ICD-10-CM

## 2025-07-16 PROCEDURE — 93925 LOWER EXTREMITY STUDY: CPT | Performed by: FAMILY MEDICINE

## 2025-07-17 ENCOUNTER — PATIENT MESSAGE (OUTPATIENT)
Facility: CLINIC | Age: 85
End: 2025-07-17

## 2025-07-23 NOTE — TELEPHONE ENCOUNTER
Per Hilary BURR    Yes  Need 6MWT and see me    Patient's daughter Octavio called. Left detailed message. Making aware Hilary BURR would like patient to do a walk susy and see her after. Awaiting call back.

## 2025-07-24 ENCOUNTER — TELEPHONE (OUTPATIENT)
Facility: LOCATION | Age: 85
End: 2025-07-24

## 2025-07-25 ENCOUNTER — TELEPHONE (OUTPATIENT)
Dept: SURGERY | Facility: CLINIC | Age: 85
End: 2025-07-25

## 2025-08-11 ENCOUNTER — NURSE ONLY (OUTPATIENT)
Dept: SURGERY | Facility: CLINIC | Age: 85
End: 2025-08-11

## 2025-08-11 DIAGNOSIS — R33.9 URINARY RETENTION: Primary | ICD-10-CM

## 2025-08-11 PROCEDURE — 51705 CHANGE OF BLADDER TUBE: CPT | Performed by: UROLOGY

## 2025-08-14 ENCOUNTER — OFFICE VISIT (OUTPATIENT)
Facility: LOCATION | Age: 85
End: 2025-08-14
Attending: INTERNAL MEDICINE

## 2025-08-14 VITALS
DIASTOLIC BLOOD PRESSURE: 53 MMHG | RESPIRATION RATE: 20 BRPM | HEART RATE: 59 BPM | OXYGEN SATURATION: 93 % | TEMPERATURE: 98 F | BODY MASS INDEX: 25.16 KG/M2 | SYSTOLIC BLOOD PRESSURE: 143 MMHG | HEIGHT: 65 IN | WEIGHT: 151 LBS

## 2025-08-14 DIAGNOSIS — D64.9 ANEMIA, UNSPECIFIED TYPE: ICD-10-CM

## 2025-08-14 DIAGNOSIS — D50.8 IRON DEFICIENCY ANEMIA SECONDARY TO INADEQUATE DIETARY IRON INTAKE: Primary | ICD-10-CM

## 2025-08-14 DIAGNOSIS — D63.1 ANEMIA OF CHRONIC RENAL FAILURE, STAGE 3 (MODERATE), UNSPECIFIED WHETHER STAGE 3A OR 3B CKD (HCC): ICD-10-CM

## 2025-08-14 DIAGNOSIS — N18.30 ANEMIA OF CHRONIC RENAL FAILURE, STAGE 3 (MODERATE), UNSPECIFIED WHETHER STAGE 3A OR 3B CKD (HCC): ICD-10-CM

## 2025-08-14 LAB
ANION GAP SERPL CALC-SCNC: 11 MMOL/L (ref 0–18)
BASOPHILS # BLD AUTO: 0.02 X10(3) UL (ref 0–0.2)
BASOPHILS NFR BLD AUTO: 0.4 %
BUN BLD-MCNC: 19 MG/DL (ref 9–23)
CALCIUM BLD-MCNC: 9.1 MG/DL (ref 8.7–10.6)
CHLORIDE SERPL-SCNC: 107 MMOL/L (ref 98–112)
CO2 SERPL-SCNC: 25 MMOL/L (ref 21–32)
CREAT BLD-MCNC: 1.88 MG/DL (ref 0.7–1.3)
DEPRECATED HBV CORE AB SER IA-ACNC: 147 NG/ML (ref 50–336)
EGFRCR SERPLBLD CKD-EPI 2021: 34 ML/MIN/1.73M2 (ref 60–?)
EOSINOPHIL # BLD AUTO: 0.21 X10(3) UL (ref 0–0.7)
EOSINOPHIL NFR BLD AUTO: 4.1 %
ERYTHROCYTE [DISTWIDTH] IN BLOOD BY AUTOMATED COUNT: 15.8 %
FOLATE SERPL-MCNC: 10.2 NG/ML (ref 5.4–?)
GLUCOSE BLD-MCNC: 137 MG/DL (ref 70–99)
HCT VFR BLD AUTO: 26.1 % (ref 39–53)
HGB BLD-MCNC: 9.4 G/DL (ref 13–17.5)
IGA SERPL-MCNC: 443.1 MG/DL (ref 40–350)
IGM SERPL-MCNC: 83 MG/DL (ref 50–300)
IMM GRANULOCYTES # BLD AUTO: 0.01 X10(3) UL (ref 0–1)
IMM GRANULOCYTES NFR BLD: 0.2 %
IMMUNOGLOBULIN PNL SER-MCNC: 1539 MG/DL (ref 650–1600)
IRON SATN MFR SERPL: 10 % (ref 20–50)
IRON SERPL-MCNC: 23 UG/DL (ref 65–175)
LYMPHOCYTES # BLD AUTO: 0.48 X10(3) UL (ref 1–4)
LYMPHOCYTES NFR BLD AUTO: 9.4 %
MCH RBC QN AUTO: 30 PG (ref 26–34)
MCHC RBC AUTO-ENTMCNC: 36 G/DL (ref 31–37)
MCV RBC AUTO: 83.4 FL (ref 80–100)
MONOCYTES # BLD AUTO: 0.41 X10(3) UL (ref 0.1–1)
MONOCYTES NFR BLD AUTO: 8 %
NEUTROPHILS # BLD AUTO: 4 X10 (3) UL (ref 1.5–7.7)
NEUTROPHILS # BLD AUTO: 4 X10(3) UL (ref 1.5–7.7)
NEUTROPHILS NFR BLD AUTO: 77.9 %
OSMOLALITY SERPL CALC.SUM OF ELEC: 300 MOSM/KG (ref 275–295)
PLATELET # BLD AUTO: 181 10(3)UL (ref 150–450)
POTASSIUM SERPL-SCNC: 3.8 MMOL/L (ref 3.5–5.1)
RBC # BLD AUTO: 3.13 X10(6)UL (ref 3.8–5.8)
SODIUM SERPL-SCNC: 143 MMOL/L (ref 136–145)
TOTAL IRON BINDING CAPACITY: 242 UG/DL (ref 250–425)
TRANSFERRIN SERPL-MCNC: 170 MG/DL (ref 215–365)
VIT B12 SERPL-MCNC: 418 PG/ML (ref 211–911)
WBC # BLD AUTO: 5.1 X10(3) UL (ref 4–11)

## 2025-08-15 ENCOUNTER — TELEPHONE (OUTPATIENT)
Facility: LOCATION | Age: 85
End: 2025-08-15

## 2025-08-19 LAB
ALBUMIN SERPL ELPH-MCNC: 3.86 G/DL (ref 3.75–5.21)
ALBUMIN/GLOB SERPL: 1.16 (ref 1–2)
ALPHA1 GLOB SERPL ELPH-MCNC: 0.33 G/DL (ref 0.19–0.46)
ALPHA2 GLOB SERPL ELPH-MCNC: 0.67 G/DL (ref 0.48–1.05)
B-GLOBULIN SERPL ELPH-MCNC: 0.97 G/DL (ref 0.68–1.23)
GAMMA GLOB SERPL ELPH-MCNC: 1.37 G/DL (ref 0.62–1.7)
KAPPA LC FREE SER-MCNC: 5.21 MG/DL (ref 0.33–1.94)
KAPPA LC FREE/LAMBDA FREE SER NEPH: 0.84 (ref 0.26–1.65)
LAMBDA LC FREE SERPL-MCNC: 6.18 MG/DL (ref 0.57–2.63)
PROT SERPL-MCNC: 7.2 G/DL (ref 5.7–8.2)

## 2025-08-20 ENCOUNTER — TELEPHONE (OUTPATIENT)
Facility: LOCATION | Age: 85
End: 2025-08-20

## 2025-08-29 ENCOUNTER — OFFICE VISIT (OUTPATIENT)
Facility: LOCATION | Age: 85
End: 2025-08-29
Attending: INTERNAL MEDICINE

## 2025-08-29 VITALS
TEMPERATURE: 98 F | HEART RATE: 74 BPM | RESPIRATION RATE: 18 BRPM | DIASTOLIC BLOOD PRESSURE: 84 MMHG | OXYGEN SATURATION: 92 % | SYSTOLIC BLOOD PRESSURE: 175 MMHG

## 2025-08-29 DIAGNOSIS — D50.8 IRON DEFICIENCY ANEMIA SECONDARY TO INADEQUATE DIETARY IRON INTAKE: Primary | ICD-10-CM

## (undated) DEVICE — 1200CC GUARDIAN II: Brand: GUARDIAN

## (undated) DEVICE — STANDARD HYPODERMIC NEEDLE,POLYPROPYLENE HUB: Brand: MONOJECT

## (undated) DEVICE — SPONGE 4X4 10PK

## (undated) DEVICE — GIJAW SINGLE-USE BIOPSY FORCEPS WITH NEEDLE: Brand: GIJAW

## (undated) DEVICE — KIT CUSTOM ENDOPROCEDURE STERIS

## (undated) DEVICE — GLOVE SUR 7 SENSICARE PI PIP CRM PWD F

## (undated) DEVICE — BAG DRNGE 2000ML URIN INF CTRL ANTIREFLX

## (undated) DEVICE — #11 STERILE BLADE: Brand: POLYMER COATED BLADES

## (undated) DEVICE — GAUZE,SPONGE,4"X4",12PLY,STERILE,LF,2'S: Brand: MEDLINE

## (undated) DEVICE — 3M™ RED DOT™ MONITORING ELECTRODE WITH FOAM TAPE AND STICKY GEL, 50/BAG, 20/CASE, 72/PLT 2570: Brand: RED DOT™

## (undated) DEVICE — V2 SPECIMEN COLLECTION MANIFOLD KIT: Brand: NEPTUNE

## (undated) DEVICE — NEPTUNE E-SEP SMOKE EVACUATION PENCIL, COATED, 70MM BLADE, PUSH BUTTON SWITCH: Brand: NEPTUNE E-SEP

## (undated) DEVICE — 10FT COMBINED O2 DELIVERY/CO2 MONITORING. FILTER WITH MICROSTREAM TYPE LUER: Brand: DUAL ADULT NASAL CANNULA

## (undated) DEVICE — PREMIUM WET SKIN PREP TRAY: Brand: MEDLINE INDUSTRIES, INC.

## (undated) DEVICE — BITEBLOCK ENDOSCP 60FR MAXI STRP

## (undated) DEVICE — NEEDLE SPNL 18GA L3.5IN PNK QNCKE STYL DISP

## (undated) DEVICE — Device

## (undated) DEVICE — GAUZE - RF AND X-RAY DETECTABLE USP TYPE VII, 16 PLY: Brand: SITUATE

## (undated) DEVICE — SYRINGE MED 30ML STD CLR PLAS LL TIP N CTRL

## (undated) DEVICE — CATHETER,FOLEY,COUDE,LATEX,16FR,10ML: Brand: MEDLINE

## (undated) DEVICE — KIT VLV 5 PC AIR H2O SUCT BX ENDOGATOR CONN

## (undated) DEVICE — PACK GYNE CUSTOM

## (undated) NOTE — LETTER
[facility]  [address]  [facility] [address]  [phone]    05/29/25          Laura Marino MD  [address]  [facility] [address]          Patient: Lawyer Vira Mccarty   YOB: 1940   Date of Visit: 5/29/2025     Dear Dr. Jamila MD,      I had the pleasure of seeing Lawyer Vira Mccarty in my office.  Please find my progress note below.  Please do not hesitate to contact me with any questions or concerns.    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 He is accompanied by his daughter today.    He lives in [place] where he typically gets his medical care but is visiting his daughter here.     Has history of urinary retention s/p cysto and TURP. Was recommended to have moctezuma placement initially but refused. Was told that his bladder was too weak and unlikely to recover. Ultimately SP tube placement was discussed with his urologist in [place] but ultimately care here to have it placed. S/p SPT placement 10/2024.     Has followed with nephrology in [place] in the past, felt to have CKD due to diabetic nephropathy and obstructive uropathy. Baseline serum creatinine ~1.2-1.6 mg/dL until this year, now up to 1-5-1.8 mg/dL 4/2025. Labs obtained 5/29/2025 showed a serum creatinine of 1.80 mg/dL. Has had low grade proteinuria since 2019.    Metformin was recently discontinued due to GFR. He is planning to go back to [place] and return when he is due to see his medical providers next.    Denies any known family history of kidney disease.     ROS:    Denies fever/chills  Denies wt loss/gain  Denies HA or visual changes  Denies CP or palpitations  Denies SOB/cough/hemoptysis  Denies abd or flank pain  Denies  N/V/D  Denies change in urinary habits or gross hematuria  Denies LE edema  Denies skin rashes/myalgias/arthralgias    PMH:  Past Medical History[1]    PSH:  Past Surgical History[2]    Medications (Active prior to today's visit):  Current Medications[3]    Allergies:  Allergies[4]    Social History:  Social Hx on file[5]     Family History:  Denies family history of kidney disease.    PHYSICAL EXAM:   /70   Wt 150 lb 8 oz (68.3 kg)   BMI 24.29 kg/m²    Wt Readings from Last 3 Encounters:   05/29/25 150 lb 8 oz (68.3 kg)   11/13/24 153 lb (69.4 kg)   10/24/24 141 lb 4.8 oz (64.1 kg)     General: Alert and oriented in no apparent distress.  HEENT: No scleral icterus, MMM  Neck: Supple, no KENNEY or thyromegaly  Cardiac: Regular rate and rhythm, S1, S2 normal  Lungs: Clear without wheezes, rales, rhonchi.    Abdomen: Soft, non-tender.   Extremities: Without clubbing, cyanosis or edema.  Neurologic:  normal affect,  moving all extremities  Skin: Warm and dry, no rashes    DATA:     COMPREHENSIVE PROFILE  Reviewed date:05/29/2025 09:53:30 AM  Interpretation:Abnormal  Performing Lab:[email or web address]  Notes/Report:   GLUCOSE 129 65 - 110 mg/dL     UREA NITROGEN BLOOD (BUN) 25 6 - 24 mg/dL     CREATININE, BLOOD 1.80 0.6 - 1.4 mg/dL     BUN/CREAT 13.9 5 - 26 MG/DL     SODIUM 138 135 - 148 mEq/L     POTASSIUM 4.1 3.5 - 5.3 mEq/L     CHLORIDES 105 98 - 108 mEq/L     ALK PHOSPHATASE 60 35 - 115 U/L     CARBON DIOXIDE 22 21 - 31 mEq/L     CALCIUM 9.0 8.3 - 11.0 mg/dL     SGOT/AST 12 7 - 48 U/L     TOTAL PROTEIN 6.9 6.0 - 8.5 g/dL     ALBUMIN 3.8 3.3 - 5.2 g/dL     TOTAL BILIRUBIN 0.7 0.2 - 1.2 mg/dL     SGPT/ALT 13 7 - 48 U/L     eGFR 36.2 > 60 mL/min            ASSESSMENT/PLAN:      1) Chronic kidney disease stage 3b: Has progressive CKD with a baseline serum creatinine of around 1.5-1.8 mg/dL in setting of diabetic nephropathy, small vessel disease d/t HTN and obstructive uropathy. He has evidence of proteinuria  for many years as well. Discussed importance of staying well-hydrated, ensuring optimal control of DM2 and HTN as well as maintaining SPT. He is on losartan which has renal protective effects as well.     2) DM2: last HbA1c 6.5%, metformin recently discontinued per PCP due to decreasing GFR. Would consider addition of alternative anti-glycemic agent.     3) HTN: controlled, on coreg, losartan and imdur    4) HLD: on crestor    5) Urinary retention: s/p TURP and now SP tube placement. Follows with urology.    Thank you for allowing me to participate in this patient's care. Please feel free to call me with any questions or concerns.     Gabby Olivas MD    I spent a total of 45 minutes on the day of this visit reviewing chart, history, patient symptoms, underlying risk factors, medical conditions, diagnosis, formulation of treatment plan, counseling patient/family, and initiation of medical therapy.        [1]   Past Medical History:   AMS (altered mental status)    Arthritis    Ataxia    Atherosclerosis of coronary artery    CKD (chronic kidney disease), stage III (HCC)    Congestive heart disease (HCC)    Diabetes (HCC)    Diverticulosis of large intestine    Esophageal reflux    Essential hypertension    High blood pressure    High cholesterol    Hyperlipidemia    Pulmonary emphysema (HCC)    Renal disorder    UTI (urinary tract infection)    Visual impairment   [2]   Past Surgical History:  Procedure Laterality Date    Amputation metatarsal+toe,single Left     LEFT GREAT TOE    Cataract      Cath, suprapubic/cystoscopic  10/2024    Other surgical history     [3]   Current Outpatient Medications   Medication Sig Dispense Refill    isosorbide mononitrate ER 60 MG Oral Tablet 24 Hr Take 1 tablet (60 mg total) by mouth daily.      Ferrous Sulfate 325 (65 Fe) MG Oral Tab Take 1 tablet (325 mg total) by mouth daily with breakfast. (Patient taking differently: Take 1 tablet (325 mg total) by mouth 2 (two) times a day.)       Potassium Chloride ER 20 MEQ Oral Tab CR TAKE 1 TABLET BY MOUTH DAILY WITH FOOD FOR 3 DAYS (Patient not taking: Reported on 2025)      dilTIAZem  MG Oral Capsule SR 24 Hr Take 1 capsule (120 mg total) by mouth daily. 90 capsule 0    losartan 50 MG Oral Tab Take 1 tablet (50 mg total) by mouth 2 (two) times daily.      pantoprazole 40 MG Oral Tab EC Take 1 tablet (40 mg total) by mouth every morning.      Cholecalciferol 50 MCG (2000 UT) Oral Tab Take 1 tablet (2,000 Units total) by mouth daily.      metFORMIN 500 MG Oral Tab Take 1 tablet (500 mg total) by mouth daily with breakfast.      carvedilol 25 MG Oral Tab Take 1 tablet (25 mg total) by mouth 2 (two) times daily.      cilostazol 100 MG Oral Tab Take 1 tablet (100 mg total) by mouth 2 (two) times daily.      rosuvastatin 10 MG Oral Tab Take 1 tablet (10 mg total) by mouth nightly.      tamsulosin 0.4 MG Oral Cap Take 1 capsule (0.4 mg total) by mouth nightly.     [4] No Known Allergies  [5]   Social History  Socioeconomic History    Marital status: Single   Occupational History    Occupation: [employer]     Comment: [employer]  exposed to asbestos and fumes   Tobacco Use    Smoking status: Former     Current packs/day: 0.00     Average packs/day: 2.0 packs/day for 49.0 years (98.0 ttl pk-yrs)     Types: Cigarettes     Start date:      Quit date:      Years since quittin.4    Smokeless tobacco: Never   Vaping Use    Vaping status: Never Used   Substance and Sexual Activity    Alcohol use: Not Currently    Drug use: Not Currently   Other Topics Concern    Caffeine Concern Yes     Comment: coffee occ     Exercise No            Sincerely,   Gabby Olivas MD         Document electronically generated by:  Gabby Olivas MD

## (undated) NOTE — LETTER
Patient Name: Vira Mccarty  Surgery Date: 10/24/2024  Medical Record: [id number] CSN: [id number]      Surgeon(s):  Rikki Gamez MD  Consent Procedure: CYSTOSCOPY, PERCUTANEOUS INSERTION SUPRA-PUBIC CATHETER  Anesthesia Type: General    Please fax any reports from cardiac workup      Your Prompt response is appreciated,    CARLEY Sam  PreAdmissionTesting  [phone]-Fax  [phone] Phone